# Patient Record
Sex: FEMALE | Race: WHITE | HISPANIC OR LATINO | Employment: UNEMPLOYED | URBAN - METROPOLITAN AREA
[De-identification: names, ages, dates, MRNs, and addresses within clinical notes are randomized per-mention and may not be internally consistent; named-entity substitution may affect disease eponyms.]

---

## 2021-07-27 ENCOUNTER — IMMUNIZATIONS (OUTPATIENT)
Dept: FAMILY MEDICINE CLINIC | Facility: HOSPITAL | Age: 41
End: 2021-07-27

## 2021-07-27 DIAGNOSIS — Z23 ENCOUNTER FOR IMMUNIZATION: Primary | ICD-10-CM

## 2021-07-27 PROCEDURE — 0011A SARS-COV-2 / COVID-19 MRNA VACCINE (MODERNA) 100 MCG: CPT

## 2021-07-27 PROCEDURE — 91301 SARS-COV-2 / COVID-19 MRNA VACCINE (MODERNA) 100 MCG: CPT

## 2021-12-11 ENCOUNTER — HOSPITAL ENCOUNTER (EMERGENCY)
Facility: HOSPITAL | Age: 41
Discharge: HOME | End: 2021-12-11
Attending: EMERGENCY MEDICINE | Admitting: EMERGENCY MEDICINE

## 2021-12-11 VITALS
TEMPERATURE: 96.8 F | HEART RATE: 86 BPM | OXYGEN SATURATION: 100 % | DIASTOLIC BLOOD PRESSURE: 62 MMHG | SYSTOLIC BLOOD PRESSURE: 132 MMHG | BODY MASS INDEX: 24.46 KG/M2 | WEIGHT: 143.3 LBS | HEIGHT: 64 IN | RESPIRATION RATE: 18 BRPM

## 2021-12-11 DIAGNOSIS — N39.0 UTI (URINARY TRACT INFECTION): Primary | ICD-10-CM

## 2021-12-11 LAB
BACTERIA UR QL AUTO: ABNORMAL /HPF
BILIRUB UR QL STRIP: NEGATIVE
CLARITY UR: ABNORMAL
COLOR UR: ABNORMAL
EXT PREG TEST URINE: NEGATIVE
EXT. CONTROL ED NAV: NORMAL
GLUCOSE UR STRIP-MCNC: ABNORMAL MG/DL
HGB UR QL STRIP.AUTO: ABNORMAL
KETONES UR STRIP-MCNC: ABNORMAL MG/DL
LEUKOCYTE ESTERASE UR QL STRIP: ABNORMAL
NITRITE UR QL STRIP: NEGATIVE
NON-SQ EPI CELLS URNS QL MICRO: ABNORMAL /HPF
PH UR STRIP.AUTO: 5 [PH]
PROT UR STRIP-MCNC: NEGATIVE MG/DL
RBC #/AREA URNS AUTO: ABNORMAL /HPF
SP GR UR STRIP.AUTO: 1.02 (ref 1–1.03)
UROBILINOGEN UR QL STRIP.AUTO: 0.2 E.U./DL
WBC #/AREA URNS AUTO: ABNORMAL /HPF

## 2021-12-11 PROCEDURE — 87086 URINE CULTURE/COLONY COUNT: CPT | Performed by: EMERGENCY MEDICINE

## 2021-12-11 PROCEDURE — 99284 EMERGENCY DEPT VISIT MOD MDM: CPT

## 2021-12-11 PROCEDURE — 81025 URINE PREGNANCY TEST: CPT | Performed by: EMERGENCY MEDICINE

## 2021-12-11 PROCEDURE — 99284 EMERGENCY DEPT VISIT MOD MDM: CPT | Performed by: EMERGENCY MEDICINE

## 2021-12-11 PROCEDURE — 81001 URINALYSIS AUTO W/SCOPE: CPT | Performed by: EMERGENCY MEDICINE

## 2021-12-11 PROCEDURE — 87186 SC STD MICRODIL/AGAR DIL: CPT | Performed by: EMERGENCY MEDICINE

## 2021-12-11 PROCEDURE — 87077 CULTURE AEROBIC IDENTIFY: CPT | Performed by: EMERGENCY MEDICINE

## 2021-12-11 RX ORDER — SULFAMETHOXAZOLE AND TRIMETHOPRIM 800; 160 MG/1; MG/1
1 TABLET ORAL EVERY 12 HOURS SCHEDULED
Qty: 10 TABLET | Refills: 0 | Status: SHIPPED | OUTPATIENT
Start: 2021-12-11 | End: 2021-12-16

## 2021-12-11 RX ORDER — SULFAMETHOXAZOLE AND TRIMETHOPRIM 800; 160 MG/1; MG/1
1 TABLET ORAL ONCE
Status: COMPLETED | OUTPATIENT
Start: 2021-12-11 | End: 2021-12-11

## 2021-12-11 RX ADMIN — SULFAMETHOXAZOLE AND TRIMETHOPRIM 1 TABLET: 800; 160 TABLET ORAL at 12:06

## 2021-12-13 LAB — BACTERIA UR CULT: ABNORMAL

## 2023-01-16 ENCOUNTER — OFFICE VISIT (OUTPATIENT)
Dept: OBGYN CLINIC | Facility: CLINIC | Age: 43
End: 2023-01-16

## 2023-01-16 VITALS
SYSTOLIC BLOOD PRESSURE: 125 MMHG | HEART RATE: 105 BPM | HEIGHT: 62 IN | DIASTOLIC BLOOD PRESSURE: 81 MMHG | BODY MASS INDEX: 23.77 KG/M2 | WEIGHT: 129.2 LBS

## 2023-01-16 DIAGNOSIS — Z59.41 FOOD INSECURITY: ICD-10-CM

## 2023-01-16 DIAGNOSIS — Z59.82 TRANSPORTATION INSECURITY: ICD-10-CM

## 2023-01-16 DIAGNOSIS — Z13.31 POSITIVE DEPRESSION SCREENING: ICD-10-CM

## 2023-01-16 DIAGNOSIS — Z30.09 ENCOUNTER FOR COUNSELING REGARDING CONTRACEPTION: Primary | ICD-10-CM

## 2023-01-16 DIAGNOSIS — Z59.819 HOUSING INSTABILITY: ICD-10-CM

## 2023-01-16 DIAGNOSIS — Z59.9 FINANCIAL DIFFICULTIES: ICD-10-CM

## 2023-01-16 LAB — SL AMB POCT URINE HCG: NEGATIVE

## 2023-01-16 RX ORDER — MEDROXYPROGESTERONE ACETATE 150 MG/ML
150 INJECTION, SUSPENSION INTRAMUSCULAR
Qty: 1 ML | Refills: 2 | Status: SHIPPED | OUTPATIENT
Start: 2023-01-16

## 2023-01-16 RX ORDER — MEDROXYPROGESTERONE ACETATE 150 MG/ML
150 INJECTION, SUSPENSION INTRAMUSCULAR
Qty: 1 ML | Refills: 2 | Status: SHIPPED | OUTPATIENT
Start: 2023-01-16 | End: 2023-01-16

## 2023-01-16 SDOH — ECONOMIC STABILITY - INCOME SECURITY: PROBLEM RELATED TO HOUSING AND ECONOMIC CIRCUMSTANCES, UNSPECIFIED: Z59.9

## 2023-01-16 SDOH — ECONOMIC STABILITY - FOOD INSECURITY: FOOD INSECURITY: Z59.41

## 2023-01-16 SDOH — ECONOMIC STABILITY - HOUSING INSECURITY: HOUSING INSTABILITY UNSPECIFIED: Z59.819

## 2023-01-16 SDOH — ECONOMIC STABILITY - TRANSPORTATION SECURITY: TRANSPORTATION INSECURITY: Z59.82

## 2023-01-16 NOTE — PROGRESS NOTES
Assessment/Plan:    No problem-specific Assessment & Plan notes found for this encounter  RTO for annual is due 9 /2023     Diagnoses and all orders for this visit:    Encounter for counseling regarding contraception  -     POCT urine HCG  -     Discontinue: medroxyPROGESTERone (DEPO-PROVERA) 150 mg/mL injection; Inject 1 mL (150 mg total) into a muscle every 3 (three) months  -     medroxyPROGESTERone (DEPO-PROVERA) 150 mg/mL injection; Inject 1 mL (150 mg total) into a muscle every 3 (three) months  Pt to RTO for injection, discount card given  Reviewed BUM x2 wks  Recommend for pt to get hard copy of pap and MMG result    Reviewed importance of f/u wiuth her PCP for her uncontrolled DM, has appt 2/13/23  Reviewed to go to the ER if needed, if symptomatic  Financial difficulties  -     Ambulatory referral to social work care management program; Future    Positive depression screening  -     Ambulatory referral to social work care management program; Future    Transportation insecurity  -     Ambulatory referral to social work care management program; Future    Food insecurity  -     Ambulatory referral to social work care management program; Future    Housing instability  -     Ambulatory referral to social work care management program; Future          Subjective:      Patient ID: Kalin Davis is a 40 y o  female  HPI 41 yo  G3 [id number] new pt here for contraception counseling  She is with her boyfriend who wishes to interpretation  Has been on Depo provera  Last injection was 9/28/22 through [facility]  Was seen in the ER 1/12/23 for bleeding, had her last Depo injection September and was scheduled for 1/16/23, passed a clot the size of a lime, and still has bleeding today  Today it has been 15wks almost 16 wks since her last Depo  Has used condoms for protection  Pt reports has had pap and MMG done 3 months ago through the [facility], pt reports were negative       Hx of DM, thyroid disease  Her glucose was 480 in The ER, was given Metformin  Hgb 11 2  Last Hgb A1c was 10 6 on 8/18/2022  Has appt 2/13/23 with family practice  Hx of DM, thyroid dz  The following portions of the patient's history were reviewed and updated as appropriate: allergies, current medications, past family history, past medical history, past social history, past surgical history and problem list     Review of Systems   Respiratory: Negative  Cardiovascular: Negative  Genitourinary: Positive for menstrual problem  Objective:      /81   Pulse 105   Ht 5' 2" (1 575 m)   Wt 58 6 kg (129 lb 3 2 oz)   BMI 23 63 kg/m²          Physical Exam  Constitutional:       Appearance: Normal appearance  Cardiovascular:      Rate and Rhythm: Normal rate and regular rhythm  Pulmonary:      Effort: Pulmonary effort is normal       Breath sounds: Normal breath sounds  Abdominal:      Palpations: Abdomen is soft  Tenderness: There is no abdominal tenderness  Skin:     General: Skin is warm and dry  Neurological:      Mental Status: She is oriented to person, place, and time  Psychiatric:         Mood and Affect: Mood normal          Behavior: Behavior normal        UPT is negative

## 2023-01-20 ENCOUNTER — CLINICAL SUPPORT (OUTPATIENT)
Dept: OBGYN CLINIC | Facility: CLINIC | Age: 43
End: 2023-01-20

## 2023-01-20 DIAGNOSIS — Z30.42 ENCOUNTER FOR DEPO-PROVERA CONTRACEPTION: ICD-10-CM

## 2023-01-20 RX ORDER — MEDROXYPROGESTERONE ACETATE 150 MG/ML
150 INJECTION, SUSPENSION INTRAMUSCULAR
Status: SHIPPED | OUTPATIENT
Start: 2023-01-20

## 2023-01-20 RX ADMIN — MEDROXYPROGESTERONE ACETATE 150 MG: 150 INJECTION, SUSPENSION INTRAMUSCULAR at 12:14

## 2023-01-20 NOTE — PROGRESS NOTES
Depo-Provera     • [x]   Patient provided box    o 2 Refills remain  o Refills submitted no  • Last  Annual Date / Birth control check : BC visit 01/06/2023, Annual DUE 09/2023  • Last Depo date: 1st injection today  • Side effects: no  • HCG: yes, 96/46/1337  o if applicable: negative  • Given by: Romeo Abernathy  • Site: Left Deltoid    o Calcium supplement daily teaching  o Condoms for 2 weeks following first injection dose

## 2023-01-25 ENCOUNTER — PATIENT OUTREACH (OUTPATIENT)
Dept: OBGYN CLINIC | Facility: CLINIC | Age: 43
End: 2023-01-25

## 2023-01-25 NOTE — PROGRESS NOTES
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

## 2023-02-09 ENCOUNTER — APPOINTMENT (EMERGENCY)
Dept: RADIOLOGY | Facility: HOSPITAL | Age: 43
End: 2023-02-09

## 2023-02-09 ENCOUNTER — HOSPITAL ENCOUNTER (EMERGENCY)
Facility: HOSPITAL | Age: 43
Discharge: HOME | End: 2023-02-09
Attending: EMERGENCY MEDICINE

## 2023-02-09 VITALS
DIASTOLIC BLOOD PRESSURE: 60 MMHG | TEMPERATURE: 97.5 F | SYSTOLIC BLOOD PRESSURE: 125 MMHG | OXYGEN SATURATION: 100 % | HEART RATE: 76 BPM | RESPIRATION RATE: 16 BRPM

## 2023-02-09 DIAGNOSIS — R73.9 HYPERGLYCEMIA: ICD-10-CM

## 2023-02-09 DIAGNOSIS — N39.0 UTI (URINARY TRACT INFECTION): Primary | ICD-10-CM

## 2023-02-09 LAB
ANION GAP SERPL CALCULATED.3IONS-SCNC: 12 MMOL/L (ref 4–13)
BACTERIA UR QL AUTO: ABNORMAL /HPF
BASE EX.OXY STD BLDV CALC-SCNC: 91.9 % (ref 60–80)
BASE EXCESS BLDV CALC-SCNC: -4.9 MMOL/L
BASOPHILS # BLD AUTO: 0.03 THOUSANDS/ÂΜL (ref 0–0.1)
BASOPHILS NFR BLD AUTO: 1 % (ref 0–1)
BILIRUB UR QL STRIP: NEGATIVE
BUN SERPL-MCNC: 11 MG/DL (ref 5–25)
CALCIUM SERPL-MCNC: 9.1 MG/DL (ref 8.4–10.2)
CARDIAC TROPONIN I PNL SERPL HS: <2 NG/L
CHLORIDE SERPL-SCNC: 101 MMOL/L (ref 96–108)
CLARITY UR: ABNORMAL
CO2 SERPL-SCNC: 19 MMOL/L (ref 21–32)
COLOR UR: YELLOW
CREAT SERPL-MCNC: 0.57 MG/DL (ref 0.6–1.3)
EOSINOPHIL # BLD AUTO: 0 THOUSAND/ÂΜL (ref 0–0.61)
EOSINOPHIL NFR BLD AUTO: 0 % (ref 0–6)
ERYTHROCYTE [DISTWIDTH] IN BLOOD BY AUTOMATED COUNT: 11.8 % (ref 11.6–15.1)
EXT PREGNANCY TEST URINE: NEGATIVE
EXT. CONTROL: NORMAL
GFR SERPL CREATININE-BSD FRML MDRD: 115 ML/MIN/1.73SQ M
GLUCOSE SERPL-MCNC: 240 MG/DL (ref 65–140)
GLUCOSE UR STRIP-MCNC: ABNORMAL MG/DL
HCO3 BLDV-SCNC: 18.8 MMOL/L (ref 24–30)
HCT VFR BLD AUTO: 36.6 % (ref 34.8–46.1)
HGB BLD-MCNC: 12.3 G/DL (ref 11.5–15.4)
HGB UR QL STRIP.AUTO: ABNORMAL
IMM GRANULOCYTES # BLD AUTO: 0.01 THOUSAND/UL (ref 0–0.2)
IMM GRANULOCYTES NFR BLD AUTO: 0 % (ref 0–2)
KETONES UR STRIP-MCNC: ABNORMAL MG/DL
LEUKOCYTE ESTERASE UR QL STRIP: NEGATIVE
LYMPHOCYTES # BLD AUTO: 1.38 THOUSANDS/ÂΜL (ref 0.6–4.47)
LYMPHOCYTES NFR BLD AUTO: 22 % (ref 14–44)
MCH RBC QN AUTO: 28.3 PG (ref 26.8–34.3)
MCHC RBC AUTO-ENTMCNC: 33.6 G/DL (ref 31.4–37.4)
MCV RBC AUTO: 84 FL (ref 82–98)
MONOCYTES # BLD AUTO: 0.3 THOUSAND/ÂΜL (ref 0.17–1.22)
MONOCYTES NFR BLD AUTO: 5 % (ref 4–12)
NEUTROPHILS # BLD AUTO: 4.55 THOUSANDS/ÂΜL (ref 1.85–7.62)
NEUTS SEG NFR BLD AUTO: 72 % (ref 43–75)
NITRITE UR QL STRIP: POSITIVE
NON-SQ EPI CELLS URNS QL MICRO: ABNORMAL /HPF
NRBC BLD AUTO-RTO: 0 /100 WBCS
O2 CT BLDV-SCNC: 15.8 ML/DL
PCO2 BLDV: 30.9 MM HG (ref 42–50)
PH BLDV: 7.4 [PH] (ref 7.3–7.4)
PH UR STRIP.AUTO: 6 [PH]
PLATELET # BLD AUTO: 281 THOUSANDS/UL (ref 149–390)
PMV BLD AUTO: 10 FL (ref 8.9–12.7)
PO2 BLDV: 70.9 MM HG (ref 35–45)
POTASSIUM SERPL-SCNC: 3.3 MMOL/L (ref 3.5–5.3)
PROT UR STRIP-MCNC: NEGATIVE MG/DL
RBC # BLD AUTO: 4.34 MILLION/UL (ref 3.81–5.12)
RBC #/AREA URNS AUTO: ABNORMAL /HPF
SODIUM SERPL-SCNC: 132 MMOL/L (ref 135–147)
SP GR UR STRIP.AUTO: 1.01 (ref 1–1.03)
TSH SERPL DL<=0.05 MIU/L-ACNC: 14.75 UIU/ML (ref 0.45–4.5)
UROBILINOGEN UR QL STRIP.AUTO: 0.2 E.U./DL
WBC # BLD AUTO: 6.27 THOUSAND/UL (ref 4.31–10.16)
WBC #/AREA URNS AUTO: ABNORMAL /HPF

## 2023-02-09 RX ORDER — CIPROFLOXACIN 500 MG/1
500 TABLET, FILM COATED ORAL EVERY 12 HOURS SCHEDULED
Qty: 20 TABLET | Refills: 0 | Status: SHIPPED | OUTPATIENT
Start: 2023-02-09 | End: 2023-02-19

## 2023-02-09 RX ORDER — CIPROFLOXACIN 500 MG/1
500 TABLET, FILM COATED ORAL ONCE
Status: COMPLETED | OUTPATIENT
Start: 2023-02-09 | End: 2023-02-09

## 2023-02-09 RX ADMIN — CIPROFLOXACIN 500 MG: 500 TABLET, FILM COATED ORAL at 21:57

## 2023-02-09 RX ADMIN — SODIUM CHLORIDE, SODIUM LACTATE, POTASSIUM CHLORIDE, AND CALCIUM CHLORIDE 1000 ML: .6; .31; .03; .02 INJECTION, SOLUTION INTRAVENOUS at 19:56

## 2023-02-10 NOTE — DISCHARGE INSTRUCTIONS
Please call your primary care physician and schedule appointment  Please take the antibiotics as prescribed  If you develop fevers chills worsening pain inability to tolerate anything by mouth or any new or worsening symptoms please return to the ED for further evaluation  Please continue to take your metformin as previously prescribed and monitor your blood sugars

## 2023-02-10 NOTE — ED PROVIDER NOTES
History  Chief Complaint   Patient presents with   • Hyperglycemia - Symptomatic     Pt reports having high BG at home - reports being between 250-300 at home; takes metformin; also reports chest pain     HPI  Patient is a 56-year-old female history obtained with aid of   Reports having higher glucose at home around 250-300  She is on metformin  She is not insulin-dependent  Reports her last several days has had intermittent chest pain that she described as a "fullness" in the left lower chest wall  She denies any pleuritic, exertional element denies any orthopnea or PND  Does report some anxiety related to her symptoms  Denies any infectious symptoms including fevers chills cough congestion denies any urinary symptoms dysuria hematuria no vaginal bleeding vaginal discharge  No abdominal pain  Is otherwise in her usual state of health  Prior to Admission Medications   Prescriptions Last Dose Informant Patient Reported? Taking?    Cholecalciferol (Vitamin D3) 50 MCG (2000) TABS   No No   Sig: Take 1 tablet (2,000 Units total) by mouth daily   Patient not taking: Reported on 2022   medroxyPROGESTERone (DEPO-PROVERA) 150 mg/mL injection   No No   Sig: Inject 1 mL (150 mg total) into a muscle every 3 (three) months   metFORMIN (GLUCOPHAGE) 500 mg tablet   No No   Sig: Take 1 tablet (500 mg total) by mouth 2 (two) times a day with meals   Patient taking differently: Take 500 mg by mouth daily with breakfast   metFORMIN (GLUCOPHAGE) 850 mg tablet   No No   Sig: Take 1 tablet (850 mg total) by mouth daily with breakfast   Patient not taking: Reported on 2022      Facility-Administered Medications Last Administration Doses Remaining   medroxyPROGESTERone (DEPO-PROVERA) IM injection 150 mg 2023 12:14 PM           Past Medical History:   Diagnosis Date   • Diabetes mellitus ([facility] [address] )    • Disease of thyroid gland        Past Surgical History:   Procedure Laterality Date   •  SECTION         Family History   Problem Relation Age of Onset   • Diabetes Mother    • No Known Problems Father    • Diabetes Sister    • No Known Problems Brother    • No Known Problems Brother    • No Known Problems Son    • No Known Problems Son    • Diabetes Maternal Grandmother    • Breast cancer Neg Hx    • Colon cancer Neg Hx    • Ovarian cancer Neg Hx      I have reviewed and agree with the history as documented  E-Cigarette/Vaping   • E-Cigarette Use Never User      E-Cigarette/Vaping Substances   • Nicotine No    • THC No    • CBD No    • Flavoring No    • Other No    • Unknown No      Social History     Tobacco Use   • Smoking status: Never   • Smokeless tobacco: Never   Vaping Use   • Vaping Use: Never used   Substance Use Topics   • Alcohol use: Not Currently     Comment: socially   • Drug use: Never       Review of Systems   Constitutional: Negative for chills and fever  HENT: Negative for congestion and sore throat  Eyes: Negative for redness and visual disturbance  Respiratory: Negative for cough and shortness of breath  Cardiovascular: Positive for chest pain  Negative for palpitations  Gastrointestinal: Negative for constipation, diarrhea, nausea and vomiting  Genitourinary: Negative for dysuria, hematuria, vaginal bleeding and vaginal discharge  Musculoskeletal: Negative for myalgias  Skin: Negative for rash and wound  Allergic/Immunologic: Negative for immunocompromised state  Neurological: Negative for seizures and syncope  Psychiatric/Behavioral: Negative for confusion, self-injury and suicidal ideas  The patient is nervous/anxious  Physical Exam  Physical Exam  Vitals and nursing note reviewed  Constitutional:       General: She is not in acute distress  Appearance: Normal appearance  She is well-developed  She is not ill-appearing  HENT:      Head: Normocephalic and atraumatic  No raccoon eyes        Comments: Moist mucous membranes     Right Ear: External ear normal       Left Ear: External ear normal       Nose: Nose normal  No congestion  Mouth/Throat:      Lips: Pink  Mouth: Mucous membranes are moist    Eyes:      General: Lids are normal  No scleral icterus  Conjunctiva/sclera: Conjunctivae normal    Cardiovascular:      Rate and Rhythm: Normal rate and regular rhythm  Pulses:           Radial pulses are 2+ on the right side and 2+ on the left side  Heart sounds: No murmur heard  No friction rub  Pulmonary:      Effort: Pulmonary effort is normal  No respiratory distress  Breath sounds: No wheezing or rales  Chest:      Chest wall: No tenderness or crepitus  Comments: No reproducible chest wall tenderness  Abdominal:      General: Abdomen is flat  Tenderness: There is no abdominal tenderness  There is no guarding or rebound  Musculoskeletal:         General: No swelling or signs of injury  Cervical back: Normal range of motion  No rigidity or tenderness  Right lower leg: No edema  Left lower leg: No edema  Skin:     General: Skin is warm and dry  Coloration: Skin is not jaundiced  Findings: No rash  Neurological:      Mental Status: She is alert and oriented to person, place, and time  Mental status is at baseline  Psychiatric:         Mood and Affect: Mood is anxious  Behavior: Behavior normal  Behavior is cooperative           Vital Signs  ED Triage Vitals [02/09/23 1921]   Temperature Pulse Respirations Blood Pressure SpO2   97 5 °F (36 4 °C) 97 20 132/70 100 %      Temp Source Heart Rate Source Patient Position - Orthostatic VS BP Location FiO2 (%)   Oral Monitor Sitting Left arm --      Pain Score       --           Vitals:    02/09/23 1921   BP: 132/70   Pulse: 97   Patient Position - Orthostatic VS: Sitting         Visual Acuity      ED Medications  Medications   lactated ringers bolus 1,000 mL (has no administration in time range)       Diagnostic Studies  Results Reviewed     Procedure Component Value Units Date/Time    HS Troponin 0hr (reflex protocol) [[id number]]     Lab Status: No result Specimen: Blood     Basic metabolic panel [[id number]]     Lab Status: No result Specimen: Blood     Blood gas, venous [[id number]]     Lab Status: No result Specimen: Blood     CBC and differential [[id number]]     Lab Status: No result Specimen: Blood     UA w Reflex to Microscopic w Reflex to Culture [[id number]]     Lab Status: No result Specimen: Urine     POCT pregnancy, urine [[id number]]     Lab Status: No result                  XR chest 2 views    (Results Pending)              Procedures  Procedures         ED Course  ED Course as of 02/09/23 2226   Thu Feb 09, 2023   1938 EKG NS rate 86 wo ST deviation or t wave inversions, No significant QRS widening or QT prolongation  2019 WBC: 6 27   2019 Hemoglobin: 12 3   2019 Leukocytes, UA: Negative   2019 Nitrite, UA(!): Positive   2019 PREGNANCY TEST URINE: Negative   2019 pH, Santhosh(!): 7 403   2101 TSH 3RD GENERATON(!): 14 751  On levothyroxine 25   2101 hs TnI 0hr: <2   2101 Sodium(!): 132   2101 Potassium(!): 3 3   2101 Glucose, Random(!): 240     Work-up is overall reassuring does have a positive UA with nitrates, many bacteria on microscopy with few epithelial cells  Will treat as complicated UTI given DM history  No CVA tenderness and lower concern for pyelonephritis  Will discharge in stable condition  Advised PCP follow-up  Return precautions discussed  MDM  Patient on arrival is ambulatory to room is in no acute distress, vital signs stable, afebrile  On exam lungs clear auscultation, heart without murmurs rubs or gallops abdomen soft nontender  Overall reassuring physical exam does appear somewhat anxious  Will obtain labs including BMP, VBG to assess for DKA, HHNK  Lower concern for ACS given lack of significant risk factors    Will obtain a troponin to further assess  EKG reassuring without significant arrhythmia  Disposition  Final diagnoses:   None     ED Disposition     None      Follow-up Information    None         Patient's Medications   Discharge Prescriptions    No medications on file       No discharge procedures on file      PDMP Review     None          ED Provider  Electronically Signed by           Sara Rubio MD  02/09/23 9244

## 2023-02-11 LAB
ATRIAL RATE: 86 BPM
P AXIS: 53 DEGREES
PR INTERVAL: 156 MS
QRS AXIS: 24 DEGREES
QRSD INTERVAL: 88 MS
QT INTERVAL: 364 MS
QTC INTERVAL: 435 MS
T WAVE AXIS: 4 DEGREES
VENTRICULAR RATE: 86 BPM

## 2023-03-20 ENCOUNTER — HOSPITAL ENCOUNTER (EMERGENCY)
Facility: HOSPITAL | Age: 43
Discharge: HOME | End: 2023-03-20
Attending: INTERNAL MEDICINE

## 2023-03-20 VITALS
OXYGEN SATURATION: 99 % | SYSTOLIC BLOOD PRESSURE: 135 MMHG | HEART RATE: 89 BPM | RESPIRATION RATE: 16 BRPM | TEMPERATURE: 97.8 F | DIASTOLIC BLOOD PRESSURE: 75 MMHG

## 2023-03-20 DIAGNOSIS — Z76.0 MEDICATION REFILL: ICD-10-CM

## 2023-03-20 DIAGNOSIS — L03.012 PARONYCHIA OF FINGER OF LEFT HAND: Primary | ICD-10-CM

## 2023-03-20 RX ORDER — CEPHALEXIN 500 MG/1
500 CAPSULE ORAL 3 TIMES DAILY
Qty: 21 CAPSULE | Refills: 0 | Status: SHIPPED | OUTPATIENT
Start: 2023-03-20 | End: 2023-03-20 | Stop reason: SDUPTHER

## 2023-03-20 RX ORDER — CEPHALEXIN 500 MG/1
500 CAPSULE ORAL 3 TIMES DAILY
Qty: 21 CAPSULE | Refills: 0 | Status: SHIPPED | OUTPATIENT
Start: 2023-03-20 | End: 2023-03-27

## 2023-03-20 NOTE — DISCHARGE INSTRUCTIONS
Use Tylenol every 4 hours or Motrin every 6 hours; you can alternate the 2 medications taking something every 3 hours for pain or fever  Take all oral antibiotics until done  Soak finger in warm, soapy water, keep covered in antibiotic ointment and Band-Aid to help promote drainage and softening of skin    If no improvement follow-up with your doctor in next few days     Follow-up with your doctor for all medication refills

## 2023-03-20 NOTE — ED PROVIDER NOTES
History  Chief Complaint   Patient presents with   • Finger Pain     Pt is concerned about her L 4th digit  Near the nail bed she is having swelling and pain  PMH: DM  PSH:     Patient presents to ED c/o 4-day history of pain and swelling along cuticle of left fourth digit, concerned bc h/o DM  Pt states had similar infection several weeks ago, drained purulent dc, but then resolved, now sx return, but just pain/swelling, no dc  No recent manicure, has not cut cuticle or nail  No fever, other complaints          Prior to Admission Medications   Prescriptions Last Dose Informant Patient Reported? Taking? medroxyPROGESTERone (DEPO-PROVERA) 150 mg/mL injection   No No   Sig: Inject 1 mL (150 mg total) into a muscle every 3 (three) months   metFORMIN (GLUCOPHAGE) 500 mg tablet   Yes Yes   Sig: Take 500 mg by mouth 2 (two) times a day with meals      Facility-Administered Medications Last Administration Doses Remaining   medroxyPROGESTERone (DEPO-PROVERA) IM injection 150 mg 2023 12:14 PM           Past Medical History:   Diagnosis Date   • Diabetes mellitus ([facility] [address] )    • Disease of thyroid gland        Past Surgical History:   Procedure Laterality Date   •  SECTION         Family History   Problem Relation Age of Onset   • Diabetes Mother    • No Known Problems Father    • Diabetes Sister    • No Known Problems Brother    • No Known Problems Brother    • No Known Problems Son    • No Known Problems Son    • Diabetes Maternal Grandmother    • Breast cancer Neg Hx    • Colon cancer Neg Hx    • Ovarian cancer Neg Hx      I have reviewed and agree with the history as documented      E-Cigarette/Vaping   • E-Cigarette Use Never User      E-Cigarette/Vaping Substances   • Nicotine No    • THC No    • CBD No    • Flavoring No    • Other No    • Unknown No      Social History     Tobacco Use   • Smoking status: Never   • Smokeless tobacco: Never   Vaping Use   • Vaping Use: Never used   Substance Use Topics   • Alcohol use: Not Currently     Comment: socially   • Drug use: Never       Review of Systems   Constitutional: Negative for fever  Gastrointestinal: Negative for vomiting  Skin: Negative for color change, rash and wound  Neurological: Negative for numbness  All other systems reviewed and are negative  Physical Exam  Physical Exam  Constitutional:       General: She is not in acute distress  Appearance: Normal appearance  HENT:      Nose: Nose normal       Mouth/Throat:      Mouth: Mucous membranes are moist       Pharynx: Oropharynx is clear  Pulmonary:      Effort: No respiratory distress  Musculoskeletal:         General: Swelling and tenderness present  Comments: Minimal diffuse tenderness, slight swelling noted along cuticle left ring finger, tiny hangnail noted along lateral margin, no dc, no erythema, no pocket of fluctuance, FROM maintained   Skin:     Findings: No erythema  Neurological:      General: No focal deficit present  Mental Status: She is alert  Vital Signs  ED Triage Vitals [03/20/23 1134]   Temperature Pulse Respirations Blood Pressure SpO2   97 8 °F (36 6 °C) 89 16 135/75 99 %      Temp Source Heart Rate Source Patient Position - Orthostatic VS BP Location FiO2 (%)   Oral Monitor Lying Right arm --      Pain Score       --           Vitals:    03/20/23 1134   BP: 135/75   Pulse: 89   Patient Position - Orthostatic VS: Lying         Visual Acuity      ED Medications  Medications - No data to display    Diagnostic Studies  Results Reviewed     None                 No orders to display              Procedures  Procedures         ED Course                               SBIRT 20yo+    Flowsheet Row Most Recent Value   SBIRT (23 yo +)    In order to provide better care to our patients, we are screening all of our patients for alcohol and drug use  Would it be okay to ask you these screening questions?  No Filed at: 03/20/2023 1151 Medical Decision Making  No indication for I&D at this time, will cover with oral antibx, soaks, wraps  Pt also asking for 1 month of metformin bc she ran out, confirmed that she takes 500mg BID  Amount and/or Complexity of Data Reviewed  External Data Reviewed: notes  Risk  Prescription drug management  Disposition  Final diagnoses:   Paronychia of finger of left hand   Medication refill - DM     Time reflects when diagnosis was documented in both MDM as applicable and the Disposition within this note     Time User Action Codes Description Comment    3/20/2023 11:55 AM [facility] Add [V19 661] Paronychia of finger of left hand     3/20/2023 11:57 AM [facility] Add [E11 65] Uncontrolled diabetes mellitus with hyperglycemia ([facility] [address] )     3/20/2023 12:01 PM [facility] Add [Z76 0] Medication refill     3/20/2023 12:01 PM [facility] Remove [E11 65] Uncontrolled diabetes mellitus with hyperglycemia ([facility] [address] )     3/20/2023 12:01 PM [facility] Modify [Z76 0] Medication refill DM      ED Disposition     ED Disposition   Discharge    Condition   Stable    Date/Time   Mon Mar 20, 2023 11:53 AM    Comment   Misty Choi discharge to home/self care  Follow-up Information     Follow up With Specialties Details Why Contact Info    Your PCP              Patient's Medications   Discharge Prescriptions    CEPHALEXIN (KEFLEX) 500 MG CAPSULE    Take 1 capsule (500 mg total) by mouth 3 (three) times a day for 7 days       Start Date: 3/20/2023 End Date: 3/27/2023       Order Dose: 500 mg       Quantity: 21 capsule    Refills: 0    METFORMIN (GLUCOPHAGE) 500 MG TABLET    Take 1 tablet (500 mg total) by mouth 2 (two) times a day with meals       Start Date: 3/20/2023 End Date: 4/19/2023       Order Dose: 500 mg       Quantity: 60 tablet    Refills: 0       No discharge procedures on file      PDMP Review     None          ED Provider  Electronically Signed by Brooklyn Resendiz PA-C  03/20/23 0624

## 2023-07-18 ENCOUNTER — HOSPITAL ENCOUNTER (EMERGENCY)
Facility: HOSPITAL | Age: 43
Discharge: HOME | End: 2023-07-18
Attending: EMERGENCY MEDICINE | Admitting: EMERGENCY MEDICINE

## 2023-07-18 VITALS
OXYGEN SATURATION: 100 % | HEART RATE: 94 BPM | RESPIRATION RATE: 20 BRPM | DIASTOLIC BLOOD PRESSURE: 55 MMHG | SYSTOLIC BLOOD PRESSURE: 120 MMHG | TEMPERATURE: 97.5 F

## 2023-07-18 DIAGNOSIS — N12 PYELONEPHRITIS: Primary | ICD-10-CM

## 2023-07-18 LAB
ALBUMIN SERPL BCP-MCNC: 3.9 G/DL (ref 3.5–5)
ALP SERPL-CCNC: 75 U/L (ref 34–104)
ALT SERPL W P-5'-P-CCNC: 9 U/L (ref 7–52)
ANION GAP SERPL CALCULATED.3IONS-SCNC: 8 MMOL/L
AST SERPL W P-5'-P-CCNC: 10 U/L (ref 13–39)
BACTERIA UR QL AUTO: ABNORMAL /HPF
BASOPHILS # BLD AUTO: 0.02 THOUSANDS/ÂΜL (ref 0–0.1)
BASOPHILS NFR BLD AUTO: 0 % (ref 0–1)
BILIRUB SERPL-MCNC: 0.63 MG/DL (ref 0.2–1)
BILIRUB UR QL STRIP: NEGATIVE
BUN SERPL-MCNC: 12 MG/DL (ref 5–25)
CALCIUM SERPL-MCNC: 8.4 MG/DL (ref 8.4–10.2)
CHLORIDE SERPL-SCNC: 103 MMOL/L (ref 96–108)
CLARITY UR: ABNORMAL
CO2 SERPL-SCNC: 22 MMOL/L (ref 21–32)
COLOR UR: YELLOW
CREAT SERPL-MCNC: 0.59 MG/DL (ref 0.6–1.3)
EOSINOPHIL # BLD AUTO: 0.03 THOUSAND/ÂΜL (ref 0–0.61)
EOSINOPHIL NFR BLD AUTO: 0 % (ref 0–6)
ERYTHROCYTE [DISTWIDTH] IN BLOOD BY AUTOMATED COUNT: 16 % (ref 11.6–15.1)
EXT PREGNANCY TEST URINE: NEGATIVE
EXT. CONTROL: NORMAL
GFR SERPL CREATININE-BSD FRML MDRD: 113 ML/MIN/1.73SQ M
GLUCOSE SERPL-MCNC: 252 MG/DL (ref 65–140)
GLUCOSE UR STRIP-MCNC: ABNORMAL MG/DL
HCT VFR BLD AUTO: 33.8 % (ref 34.8–46.1)
HGB BLD-MCNC: 10.8 G/DL (ref 11.5–15.4)
HGB UR QL STRIP.AUTO: ABNORMAL
IMM GRANULOCYTES # BLD AUTO: 0.03 THOUSAND/UL (ref 0–0.2)
IMM GRANULOCYTES NFR BLD AUTO: 0 % (ref 0–2)
KETONES UR STRIP-MCNC: ABNORMAL MG/DL
LEUKOCYTE ESTERASE UR QL STRIP: ABNORMAL
LIPASE SERPL-CCNC: 45 U/L (ref 11–82)
LYMPHOCYTES # BLD AUTO: 1.26 THOUSANDS/ÂΜL (ref 0.6–4.47)
LYMPHOCYTES NFR BLD AUTO: 16 % (ref 14–44)
MCH RBC QN AUTO: 24 PG (ref 26.8–34.3)
MCHC RBC AUTO-ENTMCNC: 32 G/DL (ref 31.4–37.4)
MCV RBC AUTO: 75 FL (ref 82–98)
MONOCYTES # BLD AUTO: 0.76 THOUSAND/ÂΜL (ref 0.17–1.22)
MONOCYTES NFR BLD AUTO: 9 % (ref 4–12)
NEUTROPHILS # BLD AUTO: 6.05 THOUSANDS/ÂΜL (ref 1.85–7.62)
NEUTS SEG NFR BLD AUTO: 75 % (ref 43–75)
NITRITE UR QL STRIP: POSITIVE
NON-SQ EPI CELLS URNS QL MICRO: ABNORMAL /HPF
NRBC BLD AUTO-RTO: 0 /100 WBCS
PH UR STRIP.AUTO: 7 [PH]
PLATELET # BLD AUTO: 206 THOUSANDS/UL (ref 149–390)
PMV BLD AUTO: 10.5 FL (ref 8.9–12.7)
POTASSIUM SERPL-SCNC: 3.6 MMOL/L (ref 3.5–5.3)
PROT SERPL-MCNC: 7.4 G/DL (ref 6.4–8.4)
PROT UR STRIP-MCNC: ABNORMAL MG/DL
RBC # BLD AUTO: 4.5 MILLION/UL (ref 3.81–5.12)
RBC #/AREA URNS AUTO: ABNORMAL /HPF
SODIUM SERPL-SCNC: 133 MMOL/L (ref 135–147)
SP GR UR STRIP.AUTO: 1.01 (ref 1–1.03)
UROBILINOGEN UR QL STRIP.AUTO: 0.2 E.U./DL
WBC # BLD AUTO: 8.15 THOUSAND/UL (ref 4.31–10.16)
WBC #/AREA URNS AUTO: ABNORMAL /HPF

## 2023-07-18 PROCEDURE — 36415 COLL VENOUS BLD VENIPUNCTURE: CPT | Performed by: EMERGENCY MEDICINE

## 2023-07-18 PROCEDURE — 85025 COMPLETE CBC W/AUTO DIFF WBC: CPT | Performed by: EMERGENCY MEDICINE

## 2023-07-18 PROCEDURE — 87086 URINE CULTURE/COLONY COUNT: CPT | Performed by: EMERGENCY MEDICINE

## 2023-07-18 PROCEDURE — 81001 URINALYSIS AUTO W/SCOPE: CPT | Performed by: EMERGENCY MEDICINE

## 2023-07-18 PROCEDURE — 83690 ASSAY OF LIPASE: CPT | Performed by: EMERGENCY MEDICINE

## 2023-07-18 PROCEDURE — 87077 CULTURE AEROBIC IDENTIFY: CPT | Performed by: EMERGENCY MEDICINE

## 2023-07-18 PROCEDURE — 81025 URINE PREGNANCY TEST: CPT | Performed by: EMERGENCY MEDICINE

## 2023-07-18 PROCEDURE — 80053 COMPREHEN METABOLIC PANEL: CPT | Performed by: EMERGENCY MEDICINE

## 2023-07-18 RX ORDER — KETOROLAC TROMETHAMINE 30 MG/ML
15 INJECTION, SOLUTION INTRAMUSCULAR; INTRAVENOUS ONCE
Status: COMPLETED | OUTPATIENT
Start: 2023-07-18 | End: 2023-07-18

## 2023-07-18 RX ORDER — CEFDINIR 300 MG/1
300 CAPSULE ORAL EVERY 12 HOURS SCHEDULED
Qty: 14 CAPSULE | Refills: 0 | Status: SHIPPED | OUTPATIENT
Start: 2023-07-18 | End: 2023-07-25

## 2023-07-18 RX ORDER — CEFTRIAXONE 1 G/50ML
1000 INJECTION, SOLUTION INTRAVENOUS ONCE
Status: COMPLETED | OUTPATIENT
Start: 2023-07-18 | End: 2023-07-18

## 2023-07-18 RX ADMIN — SODIUM CHLORIDE 1000 ML: 0.9 INJECTION, SOLUTION INTRAVENOUS at 14:03

## 2023-07-18 RX ADMIN — CEFTRIAXONE 1000 MG: 1 INJECTION, SOLUTION INTRAVENOUS at 14:04

## 2023-07-18 RX ADMIN — KETOROLAC TROMETHAMINE 15 MG: 30 INJECTION, SOLUTION INTRAMUSCULAR at 14:48

## 2023-07-18 NOTE — ED PROVIDER NOTES
History  Chief Complaint   Patient presents with   • Possible UTI     Pt report burning foamy urine x 4 days; pt reports she is diabetic; symptoms started after swimming a lake     Patient reports that she swam in a lake 5 days ago. After that she developed burning with urination and the constant need to use the bathroom despite only having small amounts of urine. 2 days ago she had the same symptoms but now also has pain in her bilateral flanks. She denies having any fevers or chills. She notes that she is a diabetic. Her sugars have been stable. She does not have any abdominal pain. No exacerbating or relieving factors. She has not been on any antibiotics in the past few months. She reports similar symptoms 2 years ago due to a urinary tract infection although at that time she was not having flank pain. She denies history of kidney stones. Pain is symmetric in the flanks. None       Past Medical History:   Diagnosis Date   • Diabetes mellitus ([address])        History reviewed. No pertinent surgical history. History reviewed. No pertinent family history. I have reviewed and agree with the history as documented. E-Cigarette/Vaping   • E-Cigarette Use Never User      E-Cigarette/Vaping Substances     Social History     Tobacco Use   • Smoking status: Never   • Smokeless tobacco: Never   Vaping Use   • Vaping Use: Never used   Substance Use Topics   • Alcohol use: Not Currently   • Drug use: Never       Review of Systems   All other systems reviewed and are negative. Physical Exam  Physical Exam  Vitals and nursing note reviewed. Constitutional:       General: She is not in acute distress. Appearance: She is well-developed. HENT:      Head: Normocephalic and atraumatic. Eyes:      Conjunctiva/sclera: Conjunctivae normal.   Cardiovascular:      Rate and Rhythm: Normal rate and regular rhythm. Heart sounds: No murmur heard.   Pulmonary:      Effort: Pulmonary effort is normal. No respiratory distress. Breath sounds: Normal breath sounds. Abdominal:      Palpations: Abdomen is soft. Tenderness: There is no abdominal tenderness. Genitourinary:     Comments: Mild bilateral CVA tenderness  Musculoskeletal:         General: No swelling. Cervical back: Neck supple. Skin:     General: Skin is warm and dry. Capillary Refill: Capillary refill takes less than 2 seconds. Neurological:      Mental Status: She is alert. Psychiatric:         Mood and Affect: Mood normal.         Vital Signs  ED Triage Vitals   Temperature Pulse Respirations Blood Pressure SpO2   07/18/23 1338 07/18/23 1337 07/18/23 1335 07/18/23 1337 07/18/23 1337   97.5 °F (36.4 °C) 94 20 120/55 100 %      Temp Source Heart Rate Source Patient Position - Orthostatic VS BP Location FiO2 (%)   07/18/23 1338 07/18/23 1335 07/18/23 1335 07/18/23 1335 --   Oral Monitor Sitting Right arm       Pain Score       07/18/23 1448       5           Vitals:    07/18/23 1335 07/18/23 1337   BP:  120/55   Pulse:  94   Patient Position - Orthostatic VS: Sitting          Visual Acuity      ED Medications  Medications   sodium chloride 0.9 % bolus 1,000 mL (0 mL Intravenous Stopped 7/18/23 1446)   cefTRIAXone (ROCEPHIN) IVPB (premix in dextrose) 1,000 mg 50 mL (0 mg Intravenous Stopped 7/18/23 1446)   ketorolac (TORADOL) injection 15 mg (15 mg Intravenous Given 7/18/23 1448)       Diagnostic Studies  Results Reviewed     Procedure Component Value Units Date/Time    Urine Microscopic [[id number]]  (Abnormal) Collected: 07/18/23 1359    Lab Status: Final result Specimen: Urine, Other Updated: 07/18/23 1456     RBC, UA 0-5 /hpf      WBC, UA Innumerable /hpf      Epithelial Cells None Seen /hpf      Bacteria, UA Occasional /hpf     Urine culture [[id number]] Collected: 07/18/23 1359    Lab Status:  In process Specimen: Urine, Other Updated: 07/18/23 1456    Lipase [[id number]]  (Normal) Collected: 07/18/23 1403    Lab Status: Final result Specimen: Blood from Arm, Right Updated: 07/18/23 1433     Lipase 45 u/L     Comprehensive metabolic panel [[id number]]  (Abnormal) Collected: 07/18/23 1403    Lab Status: Final result Specimen: Blood from Arm, Right Updated: 07/18/23 1433     Sodium 133 mmol/L      Potassium 3.6 mmol/L      Chloride 103 mmol/L      CO2 22 mmol/L      ANION GAP 8 mmol/L      BUN 12 mg/dL      Creatinine 0.59 mg/dL      Glucose 252 mg/dL      Calcium 8.4 mg/dL      AST 10 U/L      ALT 9 U/L      Alkaline Phosphatase 75 U/L      Total Protein 7.4 g/dL      Albumin 3.9 g/dL      Total Bilirubin 0.63 mg/dL      eGFR 113 ml/min/1.73sq m     Narrative:      [place] guidelines for Chronic Kidney Disease (CKD):   •  Stage 1 with normal or high GFR (GFR > 90 mL/min/1.73 square meters)  •  Stage 2 Mild CKD (GFR = 60-89 mL/min/1.73 square meters)  •  Stage 3A Moderate CKD (GFR = 45-59 mL/min/1.73 square meters)  •  Stage 3B Moderate CKD (GFR = 30-44 mL/min/1.73 square meters)  •  Stage 4 Severe CKD (GFR = 15-29 mL/min/1.73 square meters)  •  Stage 5 End Stage CKD (GFR <15 mL/min/1.73 square meters)  Note: GFR calculation is accurate only with a steady state creatinine    UA w Reflex to Microscopic w Reflex to Culture [[id number]]  (Abnormal) Collected: 07/18/23 1359    Lab Status: Final result Specimen: Urine, Other Updated: 07/18/23 1418     Color, UA Yellow     Clarity, UA Cloudy     Specific Gravity, UA 1.010     pH, UA 7.0     Leukocytes, UA 3+     Nitrite, UA Positive     Protein, UA Trace mg/dl      Glucose, UA 3+ mg/dl      Ketones, UA 15 (1+) mg/dl      Urobilinogen, UA 0.2 E.U./dl      Bilirubin, UA Negative     Occult Blood, UA 2+    CBC and differential [[id number]]  (Abnormal) Collected: 07/18/23 1403    Lab Status: Final result Specimen: Blood from Arm, Right Updated: 07/18/23 1416     WBC 8.15 Thousand/uL      RBC 4.50 Million/uL      Hemoglobin 10.8 g/dL      Hematocrit 33.8 %      MCV 75 fL MCH 24.0 pg      MCHC 32.0 g/dL      RDW 16.0 %      MPV 10.5 fL      Platelets 944 Thousands/uL      nRBC 0 /100 WBCs      Neutrophils Relative 75 %      Immat GRANS % 0 %      Lymphocytes Relative 16 %      Monocytes Relative 9 %      Eosinophils Relative 0 %      Basophils Relative 0 %      Neutrophils Absolute 6.05 Thousands/µL      Immature Grans Absolute 0.03 Thousand/uL      Lymphocytes Absolute 1.26 Thousands/µL      Monocytes Absolute 0.76 Thousand/µL      Eosinophils Absolute 0.03 Thousand/µL      Basophils Absolute 0.02 Thousands/µL     POCT pregnancy, urine [[id number]]  (Normal) Resulted: 07/18/23 1403    Lab Status: Final result Updated: 07/18/23 1403     EXT Preg Test, Ur Negative     Control Valid                 No orders to display              Procedures  Procedures         ED Course  ED Course as of 07/18/23 1500   Tue Jul 18, 2023   1441 On reassessment, patient is non-toxic. I discussed importance of completing antibiotics. I discussed red flags with patient that should prompt return to the emergency department. Patient verbalized understanding and agreed. SBIRT 20yo+    Flowsheet Row Most Recent Value   Initial Alcohol Screen: US AUDIT-C     1. How often do you have a drink containing alcohol? 0 Filed at: 07/18/2023 1406   2. How many drinks containing alcohol do you have on a typical day you are drinking? 0 Filed at: 07/18/2023 1406   3a. Male UNDER 65: How often do you have five or more drinks on one occasion? 0 Filed at: 07/18/2023 1406   3b. FEMALE Any Age, or MALE 65+: How often do you have 4 or more drinks on one occassion? 0 Filed at: 07/18/2023 1406   Audit-C Score 0 Filed at: 07/18/2023 1406   SAILAJA: How many times in the past year have you. .. Used an illegal drug or used a prescription medication for non-medical reasons? Never Filed at: 07/18/2023 1406                    Medical Decision Making  I have the patient.   History concerning for developing pyelonephritis. Will treat aggressively especially in light of diabetes with IV antibiotics. Pain is clearly bilateral, not concerning for infected kidney stone. Pyelonephritis: acute illness or injury  Amount and/or Complexity of Data Reviewed  Labs: ordered. Risk  Prescription drug management. Disposition  Final diagnoses:   Pyelonephritis     Time reflects when diagnosis was documented in both MDM as applicable and the Disposition within this note     Time User Action Codes Description Comment    7/18/2023  2:34 PM Blanche Wilson Add [N12] Pyelonephritis       ED Disposition     ED Disposition   Discharge    Condition   Stable    Date/Time   Tue Jul 18, 2023  2:34 PM    Comment   Sisto Kras discharge to home/self care. Follow-up Information     Follow up With Specialties Details Why Contact Info Additional Information    [facility]   [address] [place] [id number]  [phone] NX [facility]ATH[facility]QU COLEMAN [facility] [place], [place], [address]   [phone]          Discharge Medication List as of 7/18/2023  2:38 PM      START taking these medications    Details   cefdinir (OMNICEF) 300 mg capsule Take 1 capsule (300 mg total) by mouth every 12 (twelve) hours for 7 days, Starting Tue 7/18/2023, Until Tue 7/25/2023, Normal             No discharge procedures on file.     PDMP Review     None          ED Provider  Electronically Signed by           Alverto Rocha MD  07/18/23 0617

## 2023-07-21 LAB
BACTERIA UR CULT: ABNORMAL
BACTERIA UR CULT: ABNORMAL

## 2023-08-25 ENCOUNTER — HOSPITAL ENCOUNTER (EMERGENCY)
Facility: HOSPITAL | Age: 43
Discharge: HOME | End: 2023-08-25
Attending: EMERGENCY MEDICINE

## 2023-08-25 VITALS
SYSTOLIC BLOOD PRESSURE: 100 MMHG | OXYGEN SATURATION: 99 % | DIASTOLIC BLOOD PRESSURE: 60 MMHG | RESPIRATION RATE: 16 BRPM | HEART RATE: 96 BPM | TEMPERATURE: 98.3 F

## 2023-08-25 DIAGNOSIS — N39.0 UTI (URINARY TRACT INFECTION): Primary | ICD-10-CM

## 2023-08-25 LAB
BACTERIA UR QL AUTO: ABNORMAL /HPF
BILIRUB UR QL STRIP: NEGATIVE
CLARITY UR: ABNORMAL
COLOR UR: YELLOW
EXT PREGNANCY TEST URINE: NEGATIVE
EXT. CONTROL: NORMAL
GLUCOSE UR STRIP-MCNC: ABNORMAL MG/DL
HGB UR QL STRIP.AUTO: ABNORMAL
KETONES UR STRIP-MCNC: ABNORMAL MG/DL
LEUKOCYTE ESTERASE UR QL STRIP: ABNORMAL
NITRITE UR QL STRIP: NEGATIVE
NON-SQ EPI CELLS URNS QL MICRO: ABNORMAL /HPF
PH UR STRIP.AUTO: 5.5 [PH]
PROT UR STRIP-MCNC: NEGATIVE MG/DL
RBC #/AREA URNS AUTO: ABNORMAL /HPF
SP GR UR STRIP.AUTO: 1.02 (ref 1–1.03)
UROBILINOGEN UR QL STRIP.AUTO: 0.2 E.U./DL
WBC #/AREA URNS AUTO: ABNORMAL /HPF

## 2023-08-25 PROCEDURE — 99284 EMERGENCY DEPT VISIT MOD MDM: CPT | Performed by: EMERGENCY MEDICINE

## 2023-08-25 PROCEDURE — 87086 URINE CULTURE/COLONY COUNT: CPT | Performed by: EMERGENCY MEDICINE

## 2023-08-25 PROCEDURE — 81003 URINALYSIS AUTO W/O SCOPE: CPT | Performed by: EMERGENCY MEDICINE

## 2023-08-25 PROCEDURE — 99283 EMERGENCY DEPT VISIT LOW MDM: CPT

## 2023-08-25 PROCEDURE — 87147 CULTURE TYPE IMMUNOLOGIC: CPT | Performed by: EMERGENCY MEDICINE

## 2023-08-25 PROCEDURE — 87186 SC STD MICRODIL/AGAR DIL: CPT | Performed by: EMERGENCY MEDICINE

## 2023-08-25 PROCEDURE — 81025 URINE PREGNANCY TEST: CPT | Performed by: EMERGENCY MEDICINE

## 2023-08-25 PROCEDURE — 81001 URINALYSIS AUTO W/SCOPE: CPT | Performed by: EMERGENCY MEDICINE

## 2023-08-25 RX ORDER — CEPHALEXIN 250 MG/1
500 CAPSULE ORAL ONCE
Status: COMPLETED | OUTPATIENT
Start: 2023-08-25 | End: 2023-08-25

## 2023-08-25 RX ORDER — CEPHALEXIN 500 MG/1
500 CAPSULE ORAL EVERY 12 HOURS SCHEDULED
Qty: 14 CAPSULE | Refills: 0 | Status: SHIPPED | OUTPATIENT
Start: 2023-08-25 | End: 2023-09-01

## 2023-08-25 RX ADMIN — CEPHALEXIN 500 MG: 250 CAPSULE ORAL at 13:30

## 2023-08-25 NOTE — ED PROVIDER NOTES
History  Chief Complaint   Patient presents with   • Flank Pain     Pt reports recent UTI, now having abdominal discomfort and lower back pain. Pt reports urine is cloudy, malodorous. Pt was prescribed antibiotics and states it did not help     21-year-old female presents to the emergency department for evaluation of a suspected urinary tract infection. The patient is Polish-speaking only and an  was used for translation. Triage note states that the patient was complaining of flank pain and states that the patient was previously on antibiotics that did not help. Through the  the patient denies this to me. She states that she was seen in an emergency department approximately a month ago and was having flank pain and similar UTI symptoms but those symptoms resolved after the antibiotics. She states that over the past several days her dysuria has returned and that her urine appears cloudy and malodorous. The patient is unsure of what antibiotics she was previously on. She has not been taking any medications to treat her current symptoms. She denies fevers, chills, nausea, vomiting, diarrhea, diaphoresis, vaginal bleeding, vaginal discharge and genital lesions. Prior to Admission Medications   Prescriptions Last Dose Informant Patient Reported? Taking?    medroxyPROGESTERone (DEPO-PROVERA) 150 mg/mL injection   No No   Sig: Inject 1 mL (150 mg total) into a muscle every 3 (three) months   metFORMIN (GLUCOPHAGE) 500 mg tablet   Yes No   Sig: Take 500 mg by mouth 2 (two) times a day with meals   metFORMIN (GLUCOPHAGE) 500 mg tablet   No No   Sig: Take 1 tablet (500 mg total) by mouth 2 (two) times a day with meals      Facility-Administered Medications Last Administration Doses Remaining   medroxyPROGESTERone (DEPO-PROVERA) IM injection 150 mg 4/21/2023 11:41 AM           Past Medical History:   Diagnosis Date   • Diabetes mellitus ([address])    • Disease of thyroid gland Past Surgical History:   Procedure Laterality Date   •  SECTION         Family History   Problem Relation Age of Onset   • Diabetes Mother    • No Known Problems Father    • Diabetes Sister    • No Known Problems Brother    • No Known Problems Brother    • No Known Problems Son    • No Known Problems Son    • Diabetes Maternal Grandmother    • Breast cancer Neg Hx    • Colon cancer Neg Hx    • Ovarian cancer Neg Hx      I have reviewed and agree with the history as documented. E-Cigarette/Vaping   • E-Cigarette Use Never User      E-Cigarette/Vaping Substances   • Nicotine No    • THC No    • CBD No    • Flavoring No    • Other No    • Unknown No      Social History     Tobacco Use   • Smoking status: Never   • Smokeless tobacco: Never   Vaping Use   • Vaping Use: Never used   Substance Use Topics   • Alcohol use: Not Currently     Comment: socially   • Drug use: Never       Review of Systems   Constitutional: Negative for chills and fever. HENT: Negative for ear pain and sore throat. Eyes: Negative for pain and visual disturbance. Respiratory: Negative for cough and shortness of breath. Cardiovascular: Negative for chest pain and palpitations. Gastrointestinal: Negative for abdominal pain and vomiting. Genitourinary: Positive for dysuria. Negative for flank pain and hematuria. Musculoskeletal: Negative for arthralgias and back pain. Skin: Negative for color change and rash. Neurological: Negative for seizures and syncope. All other systems reviewed and are negative. Physical Exam  Physical Exam  Vitals and nursing note reviewed. Constitutional:       General: She is not in acute distress. Appearance: She is well-developed. HENT:      Head: Normocephalic and atraumatic. Eyes:      Conjunctiva/sclera: Conjunctivae normal.   Cardiovascular:      Rate and Rhythm: Normal rate and regular rhythm. Heart sounds: No murmur heard.   Pulmonary:      Effort: Pulmonary effort is normal. No respiratory distress. Breath sounds: Normal breath sounds. Abdominal:      Palpations: Abdomen is soft. Tenderness: There is no abdominal tenderness. There is no right CVA tenderness or left CVA tenderness. Musculoskeletal:         General: No swelling. Cervical back: Neck supple. Skin:     General: Skin is warm and dry. Capillary Refill: Capillary refill takes less than 2 seconds. Neurological:      Mental Status: She is alert. Psychiatric:         Mood and Affect: Mood normal.         Vital Signs  ED Triage Vitals [08/25/23 1219]   Temperature Pulse Respirations Blood Pressure SpO2   98.3 °F (36.8 °C) 96 16 100/60 99 %      Temp Source Heart Rate Source Patient Position - Orthostatic VS BP Location FiO2 (%)   Oral -- -- -- --      Pain Score       --           Vitals:    08/25/23 1219   BP: 100/60   Pulse: 96         Visual Acuity      ED Medications  Medications   cephalexin (KEFLEX) capsule 500 mg (500 mg Oral Given 8/25/23 1330)       Diagnostic Studies  Results Reviewed     Procedure Component Value Units Date/Time    Urine Microscopic [[id number]]  (Abnormal) Collected: 08/25/23 1247    Lab Status: Final result Specimen: Urine, Clean Catch Updated: 08/25/23 1310     RBC, UA 2-4 /hpf      WBC, UA 30-50 /hpf      Epithelial Cells Occasional /hpf      Bacteria, UA Occasional /hpf     Urine culture [[id number]] Collected: 08/25/23 1247    Lab Status:  In process Specimen: Urine, Clean Catch Updated: 08/25/23 1310    UA w Reflex to Microscopic w Reflex to Culture [[id number]]  (Abnormal) Collected: 08/25/23 1247    Lab Status: Final result Specimen: Urine, Clean Catch Updated: 08/25/23 1259     Color, UA Yellow     Clarity, UA Slightly Cloudy     Specific Gravity, UA 1.020     pH, UA 5.5     Leukocytes, UA 1+     Nitrite, UA Negative     Protein, UA Negative mg/dl      Glucose, UA 2+ mg/dl      Ketones, UA 15 (1+) mg/dl      Urobilinogen, UA 0.2 E.U./dl Bilirubin, UA Negative     Occult Blood, UA 2+    POCT pregnancy, urine [[id number]]  (Normal) Resulted: 08/25/23 1253    Lab Status: Final result Updated: 08/25/23 1253     EXT Preg Test, Ur Negative     Control Valid                 No orders to display              Procedures  Procedures         ED Course                 SBIRT 22yo+    Flowsheet Row Most Recent Value   Initial Alcohol Screen: US AUDIT-C     1. How often do you have a drink containing alcohol? 0 Filed at: 08/25/2023 1231   2. How many drinks containing alcohol do you have on a typical day you are drinking? 0 Filed at: 08/25/2023 1231   3a. Male UNDER 65: How often do you have five or more drinks on one occasion? 0 Filed at: 08/25/2023 1231   3b. FEMALE Any Age, or MALE 65+: How often do you have 4 or more drinks on one occassion? 0 Filed at: 08/25/2023 1231   Audit-C Score 0 Filed at: 08/25/2023 1231   SAMI: How many times in the past year have you. .. Used an illegal drug or used a prescription medication for non-medical reasons? Never Filed at: 08/25/2023 1231                    Medical Decision Making  18-year-old female presented to the emergency department for evaluation of a suspected urinary tract infection. On arrival the patient was awake, alert, oriented and in no acute distress. Vital signs within normal limits. Physical exam was unremarkable including a benign abdominal examination. Patient had no CVA tenderness. Urinalysis with 30-50 WBCs on microscopic examination with occasional bacteria. Urine culture is pending. We will treat suspected UTI with Keflex. The patient was provided with a dose here in the emergency department and given a prescription for a 7-day course. Recommendation was made for the patient to establish care with a PCP and to follow-up for continued symptoms. The patient was provided with follow-up information. Return precautions were discussed.     Patient agrees with the plan for discharge and feels comfortable to go home with proper f/u. Advised to return for worsening or additional problems. Diagnostic tests were reviewed and questions answered. Diagnosis, care plan and treatment options were discussed. The patient understands instructions and will follow up as directed. UTI (urinary tract infection): acute illness or injury  Amount and/or Complexity of Data Reviewed  Labs: ordered. Risk  Prescription drug management. Disposition  Final diagnoses:   UTI (urinary tract infection)     Time reflects when diagnosis was documented in both MDM as applicable and the Disposition within this note     Time User Action Codes Description Comment    8/25/2023  1:24 PM Tatiana Pandya Add [N39.0] UTI (urinary tract infection)       ED Disposition     ED Disposition   Discharge    Condition   Stable    Date/Time   Fri Aug 25, 2023  1:24 PM    [address] [place] [address] discharge to home/self care.                Follow-up Information     Follow up With Specialties Details Why Contact Info Additional Information    Infolink  Call  To establish care with a PCP [address] Emergency Department Emergency Medicine Go to  If symptoms worsen [address] [phone]  [facility] Emergency Department, [address] [place] [address] Dr, [address] [facility]          Discharge Medication List as of 8/25/2023  1:26 PM      START taking these medications    Details   cephalexin (KEFLEX) 500 mg capsule Take 1 capsule (500 mg total) by mouth every 12 (twelve) hours for 7 days, Starting Fri 8/25/2023, Until Fri 9/1/2023, Normal         CONTINUE these medications which have NOT CHANGED    Details   medroxyPROGESTERone (DEPO-PROVERA) 150 mg/mL injection Inject 1 mL (150 mg total) into a muscle every 3 (three) months, Starting Mon 1/16/2023, Normal      metFORMIN (GLUCOPHAGE) 500 mg tablet Take 500 mg by mouth 2 (two) times a day with meals, Historical Med             No discharge procedures on file.     PDMP Review     None          ED Provider  Electronically Signed by           Addis Rodriguez MD  08/25/23 9719

## 2023-08-27 LAB — BACTERIA UR CULT: ABNORMAL

## 2024-01-03 ENCOUNTER — HOSPITAL ENCOUNTER (EMERGENCY)
Facility: HOSPITAL | Age: 44
Discharge: HOME | End: 2024-01-04
Attending: EMERGENCY MEDICINE

## 2024-01-03 ENCOUNTER — APPOINTMENT (EMERGENCY)
Dept: RADIOLOGY | Facility: HOSPITAL | Age: 44
End: 2024-01-03

## 2024-01-03 VITALS
OXYGEN SATURATION: 99 % | TEMPERATURE: 98.5 F | DIASTOLIC BLOOD PRESSURE: 76 MMHG | SYSTOLIC BLOOD PRESSURE: 135 MMHG | HEART RATE: 98 BPM | RESPIRATION RATE: 16 BRPM

## 2024-01-03 DIAGNOSIS — E11.9 DIABETES (HCC): ICD-10-CM

## 2024-01-03 DIAGNOSIS — Z75.8 DOES NOT HAVE PRIMARY CARE PROVIDER: ICD-10-CM

## 2024-01-03 DIAGNOSIS — R05.9 COUGH: ICD-10-CM

## 2024-01-03 DIAGNOSIS — Z76.0 MEDICATION REFILL: ICD-10-CM

## 2024-01-03 DIAGNOSIS — U07.1 COVID-19: Primary | ICD-10-CM

## 2024-01-03 LAB
ANION GAP SERPL CALCULATED.3IONS-SCNC: 12 MMOL/L
BASOPHILS # BLD AUTO: 0.01 THOUSANDS/ÂΜL (ref 0–0.1)
BASOPHILS NFR BLD AUTO: 0 % (ref 0–1)
BUN SERPL-MCNC: 11 MG/DL (ref 5–25)
CALCIUM SERPL-MCNC: 8.9 MG/DL (ref 8.4–10.2)
CHLORIDE SERPL-SCNC: 104 MMOL/L (ref 96–108)
CO2 SERPL-SCNC: 16 MMOL/L (ref 21–32)
CREAT SERPL-MCNC: 0.56 MG/DL (ref 0.6–1.3)
EOSINOPHIL # BLD AUTO: 0.05 THOUSAND/ÂΜL (ref 0–0.61)
EOSINOPHIL NFR BLD AUTO: 1 % (ref 0–6)
ERYTHROCYTE [DISTWIDTH] IN BLOOD BY AUTOMATED COUNT: 12.5 % (ref 11.6–15.1)
GFR SERPL CREATININE-BSD FRML MDRD: 115 ML/MIN/1.73SQ M
GLUCOSE SERPL-MCNC: 285 MG/DL (ref 65–140)
HCT VFR BLD AUTO: 41 % (ref 34.8–46.1)
HGB BLD-MCNC: 13.7 G/DL (ref 11.5–15.4)
IMM GRANULOCYTES # BLD AUTO: 0.04 THOUSAND/UL (ref 0–0.2)
IMM GRANULOCYTES NFR BLD AUTO: 1 % (ref 0–2)
LYMPHOCYTES # BLD AUTO: 1.31 THOUSANDS/ÂΜL (ref 0.6–4.47)
LYMPHOCYTES NFR BLD AUTO: 28 % (ref 14–44)
MCH RBC QN AUTO: 29.3 PG (ref 26.8–34.3)
MCHC RBC AUTO-ENTMCNC: 33.4 G/DL (ref 31.4–37.4)
MCV RBC AUTO: 88 FL (ref 82–98)
MONOCYTES # BLD AUTO: 0.55 THOUSAND/ÂΜL (ref 0.17–1.22)
MONOCYTES NFR BLD AUTO: 12 % (ref 4–12)
NEUTROPHILS # BLD AUTO: 2.81 THOUSANDS/ÂΜL (ref 1.85–7.62)
NEUTS SEG NFR BLD AUTO: 58 % (ref 43–75)
NRBC BLD AUTO-RTO: 0 /100 WBCS
PLATELET # BLD AUTO: 184 THOUSANDS/UL (ref 149–390)
PMV BLD AUTO: 10.3 FL (ref 8.9–12.7)
POTASSIUM SERPL-SCNC: 3.7 MMOL/L (ref 3.5–5.3)
RBC # BLD AUTO: 4.68 MILLION/UL (ref 3.81–5.12)
SODIUM SERPL-SCNC: 132 MMOL/L (ref 135–147)
WBC # BLD AUTO: 4.77 THOUSAND/UL (ref 4.31–10.16)

## 2024-01-03 PROCEDURE — 36415 COLL VENOUS BLD VENIPUNCTURE: CPT | Performed by: EMERGENCY MEDICINE

## 2024-01-03 PROCEDURE — 96374 THER/PROPH/DIAG INJ IV PUSH: CPT

## 2024-01-03 PROCEDURE — 99283 EMERGENCY DEPT VISIT LOW MDM: CPT

## 2024-01-03 PROCEDURE — 0241U HB NFCT DS VIR RESP RNA 4 TRGT: CPT | Performed by: EMERGENCY MEDICINE

## 2024-01-03 PROCEDURE — 71045 X-RAY EXAM CHEST 1 VIEW: CPT

## 2024-01-03 PROCEDURE — 96361 HYDRATE IV INFUSION ADD-ON: CPT

## 2024-01-03 PROCEDURE — 93005 ELECTROCARDIOGRAM TRACING: CPT

## 2024-01-03 PROCEDURE — 99284 EMERGENCY DEPT VISIT MOD MDM: CPT | Performed by: EMERGENCY MEDICINE

## 2024-01-03 PROCEDURE — 80048 BASIC METABOLIC PNL TOTAL CA: CPT | Performed by: EMERGENCY MEDICINE

## 2024-01-03 PROCEDURE — 85025 COMPLETE CBC W/AUTO DIFF WBC: CPT | Performed by: EMERGENCY MEDICINE

## 2024-01-03 RX ORDER — KETOROLAC TROMETHAMINE 30 MG/ML
15 INJECTION, SOLUTION INTRAMUSCULAR; INTRAVENOUS ONCE
Status: COMPLETED | OUTPATIENT
Start: 2024-01-03 | End: 2024-01-03

## 2024-01-03 RX ADMIN — KETOROLAC TROMETHAMINE 15 MG: 30 INJECTION, SOLUTION INTRAMUSCULAR at 23:25

## 2024-01-03 RX ADMIN — SODIUM CHLORIDE 1000 ML: 0.9 INJECTION, SOLUTION INTRAVENOUS at 23:24

## 2024-01-03 NOTE — Clinical Note
Gabby Olivera was seen and treated in our emergency department on 1/3/2024.    No restrictions    Other - See Comments        Diagnosis: COVID-19    Gabby  is off the rest of the shift today, may return to work on return date.    She may return on this date: 01/08/2024         If you have any questions or concerns, please don't hesitate to call.      Moris Serrano MD    ______________________________           _______________          _______________  Hospital Representative                              Date                                Time

## 2024-01-04 LAB
ATRIAL RATE: 93 BPM
FLUAV RNA RESP QL NAA+PROBE: NEGATIVE
FLUBV RNA RESP QL NAA+PROBE: NEGATIVE
P AXIS: 46 DEGREES
PR INTERVAL: 146 MS
QRS AXIS: 43 DEGREES
QRSD INTERVAL: 88 MS
QT INTERVAL: 370 MS
QTC INTERVAL: 460 MS
RSV RNA RESP QL NAA+PROBE: NEGATIVE
SARS-COV-2 RNA RESP QL NAA+PROBE: POSITIVE
T WAVE AXIS: 4 DEGREES
VENTRICULAR RATE: 93 BPM

## 2024-01-04 PROCEDURE — 96361 HYDRATE IV INFUSION ADD-ON: CPT

## 2024-01-04 NOTE — ED CARE HANDOFF
Emergency Department Sign Out Note        Sign out and transfer of care from Dr. Carcamo. See Separate Emergency Department note.     The patient, Gabby Olivera, was evaluated by the previous provider for viral URI symptoms.    Workup Completed:  Initial evaluation, CBC, BMP, COVID/flu/RSV test (pending at time of signout) and chest x-ray (pending at time of signout)    ED Course / Workup Pending (followup):                                    ED Course as of 01/04/24 0105   Thu Jan 04, 2024   0014 XR chest 1 view portable  No acute cardiopulmonary disease process on my interpretation.   0014 CBC and differential  All WNL   0014 Basic metabolic panel(!)  Sodium 132, bicarb 16, serum glucose 285 in the setting of known diabetes.  Otherwise all within normal limits and no acute actionable abnormality.   0014 FLU/RSV/COVID - if FLU/RSV clinically relevant(!)  Positive for COVID-19, otherwise negative for influenza and RSV   0015 Procedure Note: EKG  Date/Time: 01/03/24 11:33 AM   Interpreted by: Moris Serrano MD  Indications / Diagnosis: Chest tightness, cough  ECG reviewed by me, the ED Physician: yes   The EKG demonstrates:  Rhythm: normal sinus rhythm 93 bpm  Intervals: normal intervals  Axis: normal axis  QRS/Blocks: normal QRS  ST Changes: Nonspecific ST-T wave changes.  No STEMI.     Procedures  Medical Decision Making  The patient was initially seen by Dr. Carcamo and I received the patient in signout at end of shift.  Patient is a 43-year-old female presenting for evaluation of viral URI symptoms over the last few days.  She reports experiencing a nonproductive cough, chest tightness, myalgias, and flulike symptoms.  The patient is primarily Urdu-speaking,  services utilized for encounter and review of results.  See ED course for independent interpretation of results.  Workup is positive for COVID-19.  I discussed with the patient regarding symptomatic management.  Patient also has  a history of diabetes and does not have a local primary care provider.  Will provide refill of her metformin and a referral for local primary care. I discussed all findings, treatment, red flags/return precautions, and outpatient follow-up and the patient/family understand and agree. Stable for discharge.    Amount and/or Complexity of Data Reviewed  Labs: ordered. Decision-making details documented in ED Course.  Radiology: ordered and independent interpretation performed. Decision-making details documented in ED Course.    Risk  Prescription drug management.            Disposition  Final diagnoses:   COVID-19   Cough   Diabetes (HCC)   Does not have primary care provider     Time reflects when diagnosis was documented in both MDM as applicable and the Disposition within this note       Time User Action Codes Description Comment    1/4/2024 12:48 AM Moris Serrano [U07.1] COVID-19     1/4/2024 12:48 AM Moris Serrano [R05.9] Cough     1/4/2024 12:48 AM Moris Serrano [E11.9] Diabetes (HCC)     1/4/2024 12:52 AM Moris Serrano [Z75.8] Does not have primary care provider     1/4/2024 12:52 AM Moris Serrano [Z76.0] Medication refill DM          ED Disposition       ED Disposition   Discharge    Condition   Stable    Date/Time   Thu Jan 4, 2024 12:53 AM    Comment   Gabby Olivera discharge to home/self care.                   Follow-up Information       Follow up With Specialties Details Why Contact Info Additional Information    [facility] Family Medicine Call in 1 week For follow-up [address] [place] [id number] [phone] [facility], [address] [place] [address], [id number] [phone]    [facility] Emergency Department Emergency Medicine Go to  If symptoms worsen [address] [place] [id number] [phone] [facility] Emergency Department, [address]  [place] [address]          Discharge Medication List as of 1/4/2024 12:54 AM        CONTINUE these medications which have CHANGED    Details   !! metFORMIN (GLUCOPHAGE) 500 mg tablet Take 1 tablet (500 mg total) by mouth 2 (two) times a day with meals, Starting Thu 1/4/2024, Normal      !! metFORMIN (GLUCOPHAGE) 500 mg tablet Take 1 tablet (500 mg total) by mouth 2 (two) times a day with meals, Starting Thu 1/4/2024, Until Sat 2/3/2024, Normal       !! - Potential duplicate medications found. Please discuss with provider.        CONTINUE these medications which have NOT CHANGED    Details   medroxyPROGESTERone (DEPO-PROVERA) 150 mg/mL injection Inject 1 mL (150 mg total) into a muscle every 3 (three) months, Starting Mon 1/16/2023, Normal                  ED Provider  Electronically Signed by     Moris Serrano MD  01/04/24 0109

## 2024-01-04 NOTE — ED PROVIDER NOTES
"History  No chief complaint on file.    43-year-old female comes in for evaluation of flulike symptoms.  Patient states that she has had a cough congestion and fever for approximately 3 days.  States she feels like she did when she had COVID previously.  Patient concerned that she states her heart \"races\" when she ambulates.  Patient is also diabetic but is not taking her metformin.      History provided by:  Patient and medical records   used: Yes    Flu Symptoms  Presenting symptoms: cough, fatigue, fever and myalgias    Presenting symptoms: no diarrhea, no headaches and no shortness of breath    Severity:  Moderate  Onset quality:  Sudden  Duration:  3 days  Progression:  Worsening  Chronicity:  New  Ineffective treatments:  None tried  Associated symptoms: decreased appetite, decreased physical activity and nasal congestion    Associated symptoms: no ear pain    Risk factors: diabetes and sick contacts        Prior to Admission Medications   Prescriptions Last Dose Informant Patient Reported? Taking?   medroxyPROGESTERone (DEPO-PROVERA) 150 mg/mL injection   No No   Sig: Inject 1 mL (150 mg total) into a muscle every 3 (three) months   metFORMIN (GLUCOPHAGE) 500 mg tablet   Yes No   Sig: Take 500 mg by mouth 2 (two) times a day with meals   metFORMIN (GLUCOPHAGE) 500 mg tablet   No No   Sig: Take 1 tablet (500 mg total) by mouth 2 (two) times a day with meals   metFORMIN (GLUCOPHAGE) 500 mg tablet   No Yes   Sig: Take 1 tablet (500 mg total) by mouth 2 (two) times a day with meals   metFORMIN (GLUCOPHAGE) 500 mg tablet   No Yes   Sig: Take 1 tablet (500 mg total) by mouth 2 (two) times a day with meals      Facility-Administered Medications Last Administration Doses Remaining   medroxyPROGESTERone (DEPO-PROVERA) IM injection 150 mg 4/21/2023 11:41 AM           Past Medical History:   Diagnosis Date    Diabetes mellitus (HCC)     Disease of thyroid gland        Past Surgical History: "   Procedure Laterality Date     SECTION         Family History   Problem Relation Age of Onset    Diabetes Mother     No Known Problems Father     Diabetes Sister     No Known Problems Brother     No Known Problems Brother     No Known Problems Son     No Known Problems Son     Diabetes Maternal Grandmother     Breast cancer Neg Hx     Colon cancer Neg Hx     Ovarian cancer Neg Hx      I have reviewed and agree with the history as documented.    E-Cigarette/Vaping    E-Cigarette Use Never User      E-Cigarette/Vaping Substances    Nicotine No     THC No     CBD No     Flavoring No     Other No     Unknown No      Social History     Tobacco Use    Smoking status: Never    Smokeless tobacco: Never   Vaping Use    Vaping status: Never Used   Substance Use Topics    Alcohol use: Not Currently     Comment: socially    Drug use: Never       Review of Systems   Constitutional:  Positive for decreased appetite, fatigue and fever.   HENT:  Positive for congestion. Negative for ear pain.    Eyes:  Negative for discharge and redness.   Respiratory:  Positive for cough. Negative for apnea, shortness of breath and wheezing.    Cardiovascular:  Negative for chest pain.   Gastrointestinal:  Negative for abdominal pain and diarrhea.   Endocrine: Negative for cold intolerance and polydipsia.   Genitourinary:  Negative for difficulty urinating and hematuria.   Musculoskeletal:  Positive for myalgias. Negative for arthralgias and back pain.   Skin:  Negative for color change and rash.   Allergic/Immunologic: Negative for environmental allergies and immunocompromised state.   Neurological:  Negative for numbness and headaches.   Hematological:  Negative for adenopathy. Does not bruise/bleed easily.   Psychiatric/Behavioral:  Negative for agitation and behavioral problems.        Physical Exam  Physical Exam  Vitals and nursing note reviewed.   Constitutional:       Appearance: Normal appearance. She is well-developed. She is  not toxic-appearing.   HENT:      Head: Normocephalic and atraumatic.      Right Ear: Tympanic membrane and external ear normal.      Left Ear: Tympanic membrane and external ear normal.      Nose: Congestion and rhinorrhea present. No nasal deformity.      Mouth/Throat:      Dentition: Normal dentition.      Pharynx: Uvula midline.   Eyes:      General: Lids are normal.         Right eye: No discharge.         Left eye: No discharge.      Conjunctiva/sclera: Conjunctivae normal.      Pupils: Pupils are equal, round, and reactive to light.   Neck:      Vascular: No carotid bruit or JVD.      Trachea: Trachea normal.   Cardiovascular:      Rate and Rhythm: Normal rate and regular rhythm. No extrasystoles are present.     Chest Wall: PMI is not displaced.      Pulses: Normal pulses.   Pulmonary:      Effort: Pulmonary effort is normal. No accessory muscle usage or respiratory distress.      Breath sounds: Normal breath sounds. No wheezing, rhonchi or rales.   Abdominal:      General: Bowel sounds are normal.      Palpations: Abdomen is soft. Abdomen is not rigid. There is no mass.      Tenderness: There is no abdominal tenderness. There is no guarding or rebound.   Musculoskeletal:      Right shoulder: No swelling, deformity or bony tenderness. Normal range of motion.      Cervical back: Normal range of motion and neck supple. No deformity, tenderness or bony tenderness.   Lymphadenopathy:      Cervical: No cervical adenopathy.   Skin:     General: Skin is warm and dry.      Findings: No rash.   Neurological:      Mental Status: She is alert and oriented to person, place, and time.      GCS: GCS eye subscore is 4. GCS verbal subscore is 5. GCS motor subscore is 6.      Cranial Nerves: No cranial nerve deficit.      Sensory: No sensory deficit.      Deep Tendon Reflexes: Reflexes are normal and symmetric.   Psychiatric:         Speech: Speech normal.         Behavior: Behavior normal.         Vital Signs  ED Triage  Vitals   Temperature Pulse Respirations Blood Pressure SpO2   01/03/24 2314 01/03/24 2312 01/03/24 2312 01/03/24 2312 01/03/24 2312   98.5 °F (36.9 °C) (!) 107 16 135/76 99 %      Temp src Heart Rate Source Patient Position - Orthostatic VS BP Location FiO2 (%)   -- -- -- -- --             Pain Score       --                  Vitals:    01/03/24 2312 01/03/24 2313   BP: 135/76    Pulse: (!) 107 98         Visual Acuity      ED Medications  Medications   sodium chloride 0.9 % bolus 1,000 mL (0 mL Intravenous Stopped 1/4/24 0058)   ketorolac (TORADOL) injection 15 mg (15 mg Intravenous Given 1/3/24 2325)       Diagnostic Studies  Results Reviewed       Procedure Component Value Units Date/Time    FLU/RSV/COVID - if FLU/RSV clinically relevant [[id number]]  (Abnormal) Collected: 01/03/24 2320    Lab Status: Final result Specimen: Nares from Nose Updated: 01/04/24 0003     SARS-CoV-2 Positive     INFLUENZA A PCR Negative     INFLUENZA B PCR Negative     RSV PCR Negative    Narrative:      FOR PEDIATRIC PATIENTS - copy/paste COVID Guidelines URL to browser: [email or web address]    SARS-CoV-2 assay is a Nucleic Acid Amplification assay intended for the  qualitative detection of nucleic acid from SARS-CoV-2 in nasopharyngeal  swabs. Results are for the presumptive identification of SARS-CoV-2 RNA.    Positive results are indicative of infection with SARS-CoV-2, the virus  causing COVID-19, but do not rule out bacterial infection or co-infection  with other viruses. Laboratories within the [place] and its  territories are required to report all positive results to the appropriate  public health authorities. Negative results do not preclude SARS-CoV-2  infection and should not be used as the sole basis for treatment or other  patient management decisions. Negative results must be combined with  clinical observations, patient history, and epidemiological  information.  This test has not been FDA cleared or approved.    This test has been authorized by FDA under an Emergency Use Authorization  (EUA). This test is only authorized for the duration of time the  declaration that circumstances exist justifying the authorization of the  emergency use of an in vitro diagnostic tests for detection of SARS-CoV-2  virus and/or diagnosis of COVID-19 infection under section 564(b)(1) of  the Act, 21 U.S.C. 360bbb-3(b)(1), unless the authorization is terminated  or revoked sooner. The test has been validated but independent review by FDA  and CLIA is pending.    Test performed using [employer] GeneXpert: This RT-PCR assay targets N2,  a region unique to SARS-CoV-2. A conserved region in the E-gene was chosen  for pan-Sarbecovirus detection which includes SARS-CoV-2.    According to CMS-2020-01-R, this platform meets the definition of high-throughput technology.    Basic metabolic panel [[id number]]  (Abnormal) Collected: 01/03/24 2320    Lab Status: Final result Specimen: Blood from Arm, Left Updated: 01/03/24 2344     Sodium 132 mmol/L      Potassium 3.7 mmol/L      Chloride 104 mmol/L      CO2 16 mmol/L      ANION GAP 12 mmol/L      BUN 11 mg/dL      Creatinine 0.56 mg/dL      Glucose 285 mg/dL      Calcium 8.9 mg/dL      eGFR 115 ml/min/1.73sq m     Narrative:      National Kidney Disease Foundation guidelines for Chronic Kidney Disease (CKD):     Stage 1 with normal or high GFR (GFR > 90 mL/min/1.73 square meters)    Stage 2 Mild CKD (GFR = 60-89 mL/min/1.73 square meters)    Stage 3A Moderate CKD (GFR = 45-59 mL/min/1.73 square meters)    Stage 3B Moderate CKD (GFR = 30-44 mL/min/1.73 square meters)    Stage 4 Severe CKD (GFR = 15-29 mL/min/1.73 square meters)    Stage 5 End Stage CKD (GFR <15 mL/min/1.73 square meters)  Note: GFR calculation is accurate only with a steady state creatinine    CBC and differential [[id number]] Collected: 01/03/24 2320    Lab Status: Final result  Specimen: Blood from Arm, Left Updated: 01/03/24 2326     WBC 4.77 Thousand/uL      RBC 4.68 Million/uL      Hemoglobin 13.7 g/dL      Hematocrit 41.0 %      MCV 88 fL      MCH 29.3 pg      MCHC 33.4 g/dL      RDW 12.5 %      MPV 10.3 fL      Platelets 184 Thousands/uL      nRBC 0 /100 WBCs      Neutrophils Relative 58 %      Immat GRANS % 1 %      Lymphocytes Relative 28 %      Monocytes Relative 12 %      Eosinophils Relative 1 %      Basophils Relative 0 %      Neutrophils Absolute 2.81 Thousands/µL      Immature Grans Absolute 0.04 Thousand/uL      Lymphocytes Absolute 1.31 Thousands/µL      Monocytes Absolute 0.55 Thousand/µL      Eosinophils Absolute 0.05 Thousand/µL      Basophils Absolute 0.01 Thousands/µL                    XR chest 1 view portable   ED Interpretation by Moris Serrano MD (01/04 0014)   No acute cardiopulmonary disease process on my interpretation.                 Procedures  Procedures         ED Course                                             Medical Decision Making  Differential diagnosis includes COVID flu RSV other respiratory virus    Problems Addressed:  Cough: acute illness or injury  COVID-19: acute illness or injury  Diabetes (HCC): chronic illness or injury that poses a threat to life or bodily functions     Details: Patient noncompliant with medication.  Does not have primary care provider: chronic illness or injury     Details: Patient has been given number for PCP to contact and make an appointment in the past    Amount and/or Complexity of Data Reviewed  Labs: ordered. Decision-making details documented in ED Course.  Radiology: ordered and independent interpretation performed.    Risk  OTC drugs.  Prescription drug management.             Disposition  Final diagnoses:   COVID-19   Cough   Diabetes (HCC)   Does not have primary care provider     Time reflects when diagnosis was documented in both MDM as applicable and the Disposition within this note       Time User Action  Codes Description Comment    1/4/2024 12:48 AM Moris Serrano [U07.1] COVID-19     1/4/2024 12:48 AM Moris Serrano [R05.9] Cough     1/4/2024 12:48 AM Moris Serrano [E11.9] Diabetes (HCC)     1/4/2024 12:52 AM Moris Serrano [Z75.8] Does not have primary care provider     1/4/2024 12:52 AM Moris Serrano [Z76.0] Medication refill DM          ED Disposition       ED Disposition   Discharge    Condition   Stable    Date/Time   Thu Jan 4, 2024 12:53 AM    Comment   Gabby Olivera discharge to home/self care.                   Follow-up Information       Follow up With Specialties Details Why Contact Info Additional Information    [facility] Family Medicine Call in 1 week For follow-up [address] [place] [address] [phone] [facility], [address] [place] [address], [address] [phone]    [facility] Emergency Department Emergency Medicine Go to  If symptoms worsen [address] [address] [phone] [facility] Emergency Department, [address] [address]            Discharge Medication List as of 1/4/2024 12:54 AM        CONTINUE these medications which have CHANGED    Details   !! metFORMIN (GLUCOPHAGE) 500 mg tablet Take 1 tablet (500 mg total) by mouth 2 (two) times a day with meals, Starting Thu 1/4/2024, Normal      !! metFORMIN (GLUCOPHAGE) 500 mg tablet Take 1 tablet (500 mg total) by mouth 2 (two) times a day with meals, Starting Thu 1/4/2024, Until Sat 2/3/2024, Normal       !! - Potential duplicate medications found. Please discuss with provider.        CONTINUE these medications which have NOT CHANGED    Details   medroxyPROGESTERone (DEPO-PROVERA) 150 mg/mL injection Inject 1 mL (150 mg total) into a muscle every 3 (three) months, Starting Mon 1/16/2023, Normal                 PDMP Review       None            ED  Provider  Electronically Signed by             Anjelica Carcamo,   01/04/24 2269

## 2024-01-26 ENCOUNTER — HOSPITAL ENCOUNTER (EMERGENCY)
Facility: HOSPITAL | Age: 44
Discharge: HOME | End: 2024-01-26
Attending: EMERGENCY MEDICINE

## 2024-01-26 VITALS
RESPIRATION RATE: 18 BRPM | TEMPERATURE: 98.2 F | SYSTOLIC BLOOD PRESSURE: 128 MMHG | DIASTOLIC BLOOD PRESSURE: 83 MMHG | HEART RATE: 108 BPM | OXYGEN SATURATION: 100 %

## 2024-01-26 DIAGNOSIS — N39.0 UTI (URINARY TRACT INFECTION): Primary | ICD-10-CM

## 2024-01-26 DIAGNOSIS — E03.9 HYPOTHYROID: ICD-10-CM

## 2024-01-26 DIAGNOSIS — E11.9 DIABETES (HCC): ICD-10-CM

## 2024-01-26 LAB
ANION GAP SERPL CALCULATED.3IONS-SCNC: 10 MMOL/L
BACTERIA UR QL AUTO: ABNORMAL /HPF
BASOPHILS # BLD AUTO: 0.02 THOUSANDS/ÂΜL (ref 0–0.1)
BASOPHILS NFR BLD AUTO: 0 % (ref 0–1)
BILIRUB UR QL STRIP: NEGATIVE
BUN SERPL-MCNC: 12 MG/DL (ref 5–25)
CALCIUM SERPL-MCNC: 9.3 MG/DL (ref 8.4–10.2)
CHLORIDE SERPL-SCNC: 103 MMOL/L (ref 96–108)
CLARITY UR: ABNORMAL
CO2 SERPL-SCNC: 21 MMOL/L (ref 21–32)
COLOR UR: YELLOW
CREAT SERPL-MCNC: 0.54 MG/DL (ref 0.6–1.3)
EOSINOPHIL # BLD AUTO: 0.02 THOUSAND/ÂΜL (ref 0–0.61)
EOSINOPHIL NFR BLD AUTO: 0 % (ref 0–6)
ERYTHROCYTE [DISTWIDTH] IN BLOOD BY AUTOMATED COUNT: 12.5 % (ref 11.6–15.1)
EST. AVERAGE GLUCOSE BLD GHB EST-MCNC: 283 MG/DL
GFR SERPL CREATININE-BSD FRML MDRD: 116 ML/MIN/1.73SQ M
GLUCOSE SERPL-MCNC: 261 MG/DL (ref 65–140)
GLUCOSE SERPL-MCNC: 274 MG/DL (ref 65–140)
GLUCOSE SERPL-MCNC: 287 MG/DL (ref 65–140)
GLUCOSE UR STRIP-MCNC: ABNORMAL MG/DL
HBA1C MFR BLD: 11.5 %
HCT VFR BLD AUTO: 40.5 % (ref 34.8–46.1)
HGB BLD-MCNC: 13.9 G/DL (ref 11.5–15.4)
HGB UR QL STRIP.AUTO: ABNORMAL
IMM GRANULOCYTES # BLD AUTO: 0.07 THOUSAND/UL (ref 0–0.2)
IMM GRANULOCYTES NFR BLD AUTO: 1 % (ref 0–2)
KETONES UR STRIP-MCNC: ABNORMAL MG/DL
LEUKOCYTE ESTERASE UR QL STRIP: ABNORMAL
LYMPHOCYTES # BLD AUTO: 1.43 THOUSANDS/ÂΜL (ref 0.6–4.47)
LYMPHOCYTES NFR BLD AUTO: 17 % (ref 14–44)
MCH RBC QN AUTO: 29.3 PG (ref 26.8–34.3)
MCHC RBC AUTO-ENTMCNC: 34.3 G/DL (ref 31.4–37.4)
MCV RBC AUTO: 85 FL (ref 82–98)
MONOCYTES # BLD AUTO: 0.69 THOUSAND/ÂΜL (ref 0.17–1.22)
MONOCYTES NFR BLD AUTO: 8 % (ref 4–12)
NEUTROPHILS # BLD AUTO: 6.2 THOUSANDS/ÂΜL (ref 1.85–7.62)
NEUTS SEG NFR BLD AUTO: 74 % (ref 43–75)
NITRITE UR QL STRIP: POSITIVE
NON-SQ EPI CELLS URNS QL MICRO: ABNORMAL /HPF
NRBC BLD AUTO-RTO: 0 /100 WBCS
OTHER STN SPEC: ABNORMAL
PH UR STRIP.AUTO: 6 [PH]
PLATELET # BLD AUTO: 178 THOUSANDS/UL (ref 149–390)
PMV BLD AUTO: 10.7 FL (ref 8.9–12.7)
POTASSIUM SERPL-SCNC: 3.6 MMOL/L (ref 3.5–5.3)
PROT UR STRIP-MCNC: NEGATIVE MG/DL
RBC # BLD AUTO: 4.74 MILLION/UL (ref 3.81–5.12)
RBC #/AREA URNS AUTO: ABNORMAL /HPF
SODIUM SERPL-SCNC: 134 MMOL/L (ref 135–147)
SP GR UR STRIP.AUTO: 1.02 (ref 1–1.03)
T4 FREE SERPL-MCNC: 0.83 NG/DL (ref 0.61–1.12)
TSH SERPL DL<=0.05 MIU/L-ACNC: 22.63 UIU/ML (ref 0.45–4.5)
UROBILINOGEN UR QL STRIP.AUTO: 0.2 E.U./DL
WBC # BLD AUTO: 8.43 THOUSAND/UL (ref 4.31–10.16)
WBC #/AREA URNS AUTO: ABNORMAL /HPF

## 2024-01-26 PROCEDURE — 99284 EMERGENCY DEPT VISIT MOD MDM: CPT | Performed by: EMERGENCY MEDICINE

## 2024-01-26 PROCEDURE — 36415 COLL VENOUS BLD VENIPUNCTURE: CPT | Performed by: EMERGENCY MEDICINE

## 2024-01-26 PROCEDURE — 83036 HEMOGLOBIN GLYCOSYLATED A1C: CPT | Performed by: EMERGENCY MEDICINE

## 2024-01-26 PROCEDURE — 87591 N.GONORRHOEAE DNA AMP PROB: CPT | Performed by: EMERGENCY MEDICINE

## 2024-01-26 PROCEDURE — 84439 ASSAY OF FREE THYROXINE: CPT | Performed by: EMERGENCY MEDICINE

## 2024-01-26 PROCEDURE — 82948 REAGENT STRIP/BLOOD GLUCOSE: CPT

## 2024-01-26 PROCEDURE — 81001 URINALYSIS AUTO W/SCOPE: CPT | Performed by: EMERGENCY MEDICINE

## 2024-01-26 PROCEDURE — 85025 COMPLETE CBC W/AUTO DIFF WBC: CPT | Performed by: EMERGENCY MEDICINE

## 2024-01-26 PROCEDURE — 80048 BASIC METABOLIC PNL TOTAL CA: CPT | Performed by: EMERGENCY MEDICINE

## 2024-01-26 PROCEDURE — 84443 ASSAY THYROID STIM HORMONE: CPT | Performed by: EMERGENCY MEDICINE

## 2024-01-26 PROCEDURE — 99283 EMERGENCY DEPT VISIT LOW MDM: CPT

## 2024-01-26 PROCEDURE — 96372 THER/PROPH/DIAG INJ SC/IM: CPT

## 2024-01-26 PROCEDURE — 96360 HYDRATION IV INFUSION INIT: CPT

## 2024-01-26 PROCEDURE — 87491 CHLMYD TRACH DNA AMP PROBE: CPT | Performed by: EMERGENCY MEDICINE

## 2024-01-26 RX ORDER — PHENAZOPYRIDINE HYDROCHLORIDE 200 MG/1
200 TABLET, FILM COATED ORAL 3 TIMES DAILY PRN
Qty: 6 TABLET | Refills: 0 | Status: SHIPPED | OUTPATIENT
Start: 2024-01-26 | End: 2024-01-28

## 2024-01-26 RX ORDER — LEVOTHYROXINE SODIUM 0.05 MG/1
50 TABLET ORAL DAILY
Qty: 30 TABLET | Refills: 0 | Status: SHIPPED | OUTPATIENT
Start: 2024-01-26

## 2024-01-26 RX ORDER — CEPHALEXIN 250 MG/1
500 CAPSULE ORAL ONCE
Status: COMPLETED | OUTPATIENT
Start: 2024-01-26 | End: 2024-01-26

## 2024-01-26 RX ORDER — IBUPROFEN 600 MG/1
600 TABLET ORAL ONCE
Status: COMPLETED | OUTPATIENT
Start: 2024-01-26 | End: 2024-01-26

## 2024-01-26 RX ORDER — LEVOTHYROXINE SODIUM 0.03 MG/1
50 TABLET ORAL
Status: DISCONTINUED | OUTPATIENT
Start: 2024-01-27 | End: 2024-01-26 | Stop reason: HOSPADM

## 2024-01-26 RX ORDER — PHENAZOPYRIDINE HYDROCHLORIDE 100 MG/1
200 TABLET, FILM COATED ORAL ONCE
Status: COMPLETED | OUTPATIENT
Start: 2024-01-26 | End: 2024-01-26

## 2024-01-26 RX ORDER — CEPHALEXIN 500 MG/1
500 CAPSULE ORAL EVERY 12 HOURS SCHEDULED
Qty: 14 CAPSULE | Refills: 0 | Status: SHIPPED | OUTPATIENT
Start: 2024-01-26 | End: 2024-02-02

## 2024-01-26 RX ADMIN — SODIUM CHLORIDE 1000 ML: 0.9 INJECTION, SOLUTION INTRAVENOUS at 13:15

## 2024-01-26 RX ADMIN — PHENAZOPYRIDINE 200 MG: 100 TABLET ORAL at 13:23

## 2024-01-26 RX ADMIN — CEPHALEXIN 500 MG: 250 CAPSULE ORAL at 14:41

## 2024-01-26 RX ADMIN — IBUPROFEN 600 MG: 600 TABLET, FILM COATED ORAL at 13:23

## 2024-01-26 RX ADMIN — INSULIN HUMAN 5 UNITS: 100 INJECTION, SOLUTION PARENTERAL at 13:24

## 2024-01-26 NOTE — ED PROVIDER NOTES
History  Chief Complaint   Patient presents with    Possible UTI     PT c/o urinary pain for the past 2 days. Pt stated that she had an upper burning sensation when she peed. Pt c/o burning when voiding and after. Pt denies discharge     43-year-old female comes in for evaluation of UTI symptoms.  Patient states she has urinary pain for approximately 2 days.  Describes it as a burning sensation.  Also complains of suprapubic abdominal pain.  Also feels generally unwell.  Patient is a known diabetic.  She is supposed to be on metformin but states she stopped it approximately 2 months ago because it was not controlling her sugars.  Patient states she does not have a doctor here because she does not have insurance.      History provided by:  Patient   used: No    Difficulty Urinating      Prior to Admission Medications   Prescriptions Last Dose Informant Patient Reported? Taking?   medroxyPROGESTERone (DEPO-PROVERA) 150 mg/mL injection 12/15/2023  No No   Sig: Inject 1 mL (150 mg total) into a muscle every 3 (three) months   metFORMIN (GLUCOPHAGE) 500 mg tablet   No No   Sig: Take 1 tablet (500 mg total) by mouth 2 (two) times a day with meals   metFORMIN (GLUCOPHAGE) 500 mg tablet   No No   Sig: Take 1 tablet (500 mg total) by mouth 2 (two) times a day with meals      Facility-Administered Medications Last Administration Doses Remaining   medroxyPROGESTERone (DEPO-PROVERA) IM injection 150 mg 2023 11:41 AM           Past Medical History:   Diagnosis Date    Diabetes mellitus (HCC)     Disease of thyroid gland        Past Surgical History:   Procedure Laterality Date     SECTION         Family History   Problem Relation Age of Onset    Diabetes Mother     No Known Problems Father     Diabetes Sister     No Known Problems Brother     No Known Problems Brother     No Known Problems Son     No Known Problems Son     Diabetes Maternal Grandmother     Breast cancer Neg Hx     Colon cancer  "Neg Hx     Ovarian cancer Neg Hx      I have reviewed and agree with the history as documented.    E-Cigarette/Vaping    E-Cigarette Use Never User      E-Cigarette/Vaping Substances    Nicotine No     THC No     CBD No     Flavoring No     Other No     Unknown No      Social History     Tobacco Use    Smoking status: Never    Smokeless tobacco: Never   Vaping Use    Vaping status: Never Used   Substance Use Topics    Alcohol use: Yes     Comment: socially    Drug use: Never       Review of Systems   Genitourinary:  Positive for dysuria.       Physical Exam  Physical Exam    Vital Signs  ED Triage Vitals [01/26/24 1236]   Temperature Pulse Respirations Blood Pressure SpO2   98.2 °F (36.8 °C) (!) 108 18 128/83 100 %      Temp Source Heart Rate Source Patient Position - Orthostatic VS BP Location FiO2 (%)   Oral Monitor Sitting Left arm --      Pain Score       8           Vitals:    01/26/24 1236   BP: 128/83   Pulse: (!) 108   Patient Position - Orthostatic VS: Sitting         Visual Acuity      ED Medications  Medications   ibuprofen (MOTRIN) tablet 600 mg (600 mg Oral Given 1/26/24 1323)   phenazopyridine (PYRIDIUM) tablet 200 mg (200 mg Oral Given 1/26/24 1323)   sodium chloride 0.9 % bolus 1,000 mL (0 mL Intravenous Stopped 1/26/24 1440)   insulin regular (HumuLIN R,NovoLIN R) injection 5 Units (5 Units Subcutaneous Given 1/26/24 1324)   cephalexin (KEFLEX) capsule 500 mg (500 mg Oral Given 1/26/24 1441)       Diagnostic Studies  Results Reviewed       Procedure Component Value Units Date/Time    T4, free [[id number]]  (Normal) Collected: 01/26/24 1313    Lab Status: Final result Specimen: Blood from Arm, Right Updated: 01/26/24 1751     Free T4 0.83 ng/dL     Narrative:        \"Therapeutic range for patients medicated with thyroid disorders: 0.61-1.24 ng/dL.\"    Hemoglobin A1C [[id number]]  (Abnormal) Collected: 01/26/24 1313    Lab Status: Final result Specimen: Blood from Arm, Right Updated: 01/26/24 1736     " Hemoglobin A1C 11.5 %       mg/dl     Fingerstick Glucose (POCT) [[id number]]  (Abnormal) Collected: 01/26/24 1434    Lab Status: Final result Updated: 01/26/24 1434     POC Glucose 261 mg/dl     TSH, 3rd generation with Free T4 reflex [[id number]]  (Abnormal) Collected: 01/26/24 1313    Lab Status: Final result Specimen: Blood from Arm, Right Updated: 01/26/24 1417     TSH 3RD GENERATON 22.633 uIU/mL     Urine Microscopic [[id number]]  (Abnormal) Collected: 01/26/24 1303    Lab Status: Final result Specimen: Urine, Clean Catch Updated: 01/26/24 1341     RBC, UA 4-10 /hpf      WBC, UA Innumerable /hpf      Epithelial Cells Occasional /hpf      Bacteria, UA Moderate /hpf      OTHER OBSERVATIONS Transitional Epithelial Cells    Basic metabolic panel [[id number]]  (Abnormal) Collected: 01/26/24 1313    Lab Status: Final result Specimen: Blood from Arm, Right Updated: 01/26/24 1338     Sodium 134 mmol/L      Potassium 3.6 mmol/L      Chloride 103 mmol/L      CO2 21 mmol/L      ANION GAP 10 mmol/L      BUN 12 mg/dL      Creatinine 0.54 mg/dL      Glucose 287 mg/dL      Calcium 9.3 mg/dL      eGFR 116 ml/min/1.73sq m     Narrative:      National Kidney Disease Foundation guidelines for Chronic Kidney Disease (CKD):     Stage 1 with normal or high GFR (GFR > 90 mL/min/1.73 square meters)    Stage 2 Mild CKD (GFR = 60-89 mL/min/1.73 square meters)    Stage 3A Moderate CKD (GFR = 45-59 mL/min/1.73 square meters)    Stage 3B Moderate CKD (GFR = 30-44 mL/min/1.73 square meters)    Stage 4 Severe CKD (GFR = 15-29 mL/min/1.73 square meters)    Stage 5 End Stage CKD (GFR <15 mL/min/1.73 square meters)  Note: GFR calculation is accurate only with a steady state creatinine    CBC and differential [[id number]] Collected: 01/26/24 1313    Lab Status: Final result Specimen: Blood from Arm, Right Updated: 01/26/24 1320     WBC 8.43 Thousand/uL      RBC 4.74 Million/uL      Hemoglobin 13.9 g/dL      Hematocrit 40.5 %      MCV 85 fL       MCH 29.3 pg      MCHC 34.3 g/dL      RDW 12.5 %      MPV 10.7 fL      Platelets 178 Thousands/uL      nRBC 0 /100 WBCs      Neutrophils Relative 74 %      Immat GRANS % 1 %      Lymphocytes Relative 17 %      Monocytes Relative 8 %      Eosinophils Relative 0 %      Basophils Relative 0 %      Neutrophils Absolute 6.20 Thousands/µL      Immature Grans Absolute 0.07 Thousand/uL      Lymphocytes Absolute 1.43 Thousands/µL      Monocytes Absolute 0.69 Thousand/µL      Eosinophils Absolute 0.02 Thousand/µL      Basophils Absolute 0.02 Thousands/µL     Trichomonas vaginalis/Mycoplasma genitalium PCR [[id number]] Collected: 01/26/24 1313    Lab Status: In process Specimen: Urine, Voided Updated: 01/26/24 1318    UA (URINE) with reflex to Scope [[id number]]  (Abnormal) Collected: 01/26/24 1303    Lab Status: Final result Specimen: Urine, Clean Catch Updated: 01/26/24 1315     Color, UA Yellow     Clarity, UA Cloudy     Specific Gravity, UA 1.020     pH, UA 6.0     Leukocytes, UA Trace     Nitrite, UA Positive     Protein, UA Negative mg/dl      Glucose, UA 3+ mg/dl      Ketones, UA 40 (2+) mg/dl      Urobilinogen, UA 0.2 E.U./dl      Bilirubin, UA Negative     Occult Blood, UA 2+    Chlamydia/GC amplified DNA by PCR [[id number]] Collected: 01/26/24 1303    Lab Status: In process Specimen: Urine, Other Updated: 01/26/24 1308    Fingerstick Glucose (POCT) [[id number]]  (Abnormal) Collected: 01/26/24 1300    Lab Status: Final result Updated: 01/26/24 1301     POC Glucose 274 mg/dl                    No orders to display              Procedures  Procedures         ED Course  ED Course as of 01/26/24 1943 Fri Jan 26, 2024   1313 Discussed with patient going to our family practice or internal medicine clinic.  They take patients without insurance and have aida care and payment plans.   1434 Extensive discussion with patient that hypothyroidism and diabetes need to be managed long-term.  Patient is concerned about going to a  clinic because she does not have insurance and because she is undocumented.  I discussed with her that she should make an appointment at our family practice clinic with the [facility] as they have [place]-speaking staff and also many options for payment including aida care                                             Medical Decision Making  Differential diagnosis includes but is not limited to UTI, pyelonephritis, hyperglycemia, DKA, hypothyroid, electrolyte abnormality,    Problems Addressed:  Diabetes (HCC): chronic illness or injury with exacerbation, progression, or side effects of treatment     Details: Has not been taking her metformin  Hypothyroid: chronic illness or injury with exacerbation, progression, or side effects of treatment     Details: Has not been taking her Synthroid  UTI (urinary tract infection): acute illness or injury    Amount and/or Complexity of Data Reviewed  External Data Reviewed: notes.     Details: Reviewed medications and past medical history  Labs: ordered.    Risk  OTC drugs.  Prescription drug management.             Disposition  Final diagnoses:   Diabetes (HCC)   UTI (urinary tract infection)   Hypothyroid     Time reflects when diagnosis was documented in both MDM as applicable and the Disposition within this note       Time User Action Codes Description Comment    1/26/2024  1:14 PM Anjelica Carcamo K Add [E11.9] Diabetes (HCC)     1/26/2024  2:03 PM Eric Carcamoie K Add [N39.0] UTI (urinary tract infection)     1/26/2024  2:26 PM Anjelica Carcamo K Modify [E11.9] Diabetes (HCC)     1/26/2024  2:26 PM Turyue Anjelica K Modify [N39.0] UTI (urinary tract infection)     1/26/2024  2:26 PM Eric Carcamoie K Add [E03.9] Hypothyroid           ED Disposition       ED Disposition   Discharge    Condition   Stable    Date/Time   Fri Jan 26, 2024  2:33 PM    Comment   Gabby Olivera discharge to home/self care.                   Follow-up Information       Follow up  With Specialties Details Why Contact Info Additional Information    [facility] Schedule an appointment as soon as possible for a visit   [address] [place] [id number] [phone] [facility], [address] [place] [address], [id number] [phone]            Discharge Medication List as of 1/26/2024  2:33 PM        START taking these medications    Details   cephalexin (KEFLEX) 500 mg capsule Take 1 capsule (500 mg total) by mouth every 12 (twelve) hours for 7 days, Starting Fri 1/26/2024, Until Fri 2/2/2024, Normal      levothyroxine (Synthroid) 50 mcg tablet Take 1 tablet (50 mcg total) by mouth daily, Starting Fri 1/26/2024, Normal      phenazopyridine (Pyridium) 200 mg tablet Take 1 tablet (200 mg total) by mouth 3 (three) times a day as needed for bladder spasms for up to 2 days Will turn urine orange., Starting Fri 1/26/2024, Until Sun 1/28/2024 at 2359, Print           CONTINUE these medications which have NOT CHANGED    Details   medroxyPROGESTERone (DEPO-PROVERA) 150 mg/mL injection Inject 1 mL (150 mg total) into a muscle every 3 (three) months, Starting Mon 1/16/2023, Normal      !! metFORMIN (GLUCOPHAGE) 500 mg tablet Take 1 tablet (500 mg total) by mouth 2 (two) times a day with meals, Starting Thu 1/4/2024, Normal      !! metFORMIN (GLUCOPHAGE) 500 mg tablet Take 1 tablet (500 mg total) by mouth 2 (two) times a day with meals, Starting Thu 1/4/2024, Until Sat 2/3/2024, Normal       !! - Potential duplicate medications found. Please discuss with provider.          No discharge procedures on file.    PDMP Review       None            ED Provider  Electronically Signed by             Anjelica Carcamo DO  01/26/24 1943

## 2024-01-28 LAB
C TRACH DNA SPEC QL NAA+PROBE: NEGATIVE
N GONORRHOEA DNA SPEC QL NAA+PROBE: NEGATIVE

## 2024-01-30 LAB
M GENITALIUM DNA SPEC QL NAA+PROBE: ABNORMAL
T VAGINALIS DNA SPEC QL NAA+PROBE: ABNORMAL

## 2024-02-21 ENCOUNTER — APPOINTMENT (EMERGENCY)
Dept: ULTRASOUND IMAGING | Facility: HOSPITAL | Age: 44
End: 2024-02-21

## 2024-02-21 ENCOUNTER — HOSPITAL ENCOUNTER (EMERGENCY)
Facility: HOSPITAL | Age: 44
Discharge: HOME | End: 2024-02-21
Attending: INTERNAL MEDICINE

## 2024-02-21 VITALS
DIASTOLIC BLOOD PRESSURE: 89 MMHG | RESPIRATION RATE: 18 BRPM | OXYGEN SATURATION: 100 % | SYSTOLIC BLOOD PRESSURE: 141 MMHG | HEART RATE: 113 BPM

## 2024-02-21 DIAGNOSIS — N93.9 VAGINAL BLEEDING: Primary | ICD-10-CM

## 2024-02-21 DIAGNOSIS — E11.65 HYPERGLYCEMIA DUE TO DIABETES MELLITUS (HCC): ICD-10-CM

## 2024-02-21 LAB
ABO GROUP BLD: NORMAL
ALBUMIN SERPL BCP-MCNC: 4.1 G/DL (ref 3.5–5)
ALP SERPL-CCNC: 68 U/L (ref 34–104)
ALT SERPL W P-5'-P-CCNC: 26 U/L (ref 7–52)
ANION GAP SERPL CALCULATED.3IONS-SCNC: 11 MMOL/L
AST SERPL W P-5'-P-CCNC: 17 U/L (ref 13–39)
B-HCG SERPL-ACNC: <1 MIU/ML (ref 0–5)
BACTERIA UR QL AUTO: ABNORMAL /HPF
BASOPHILS # BLD AUTO: 0.03 THOUSANDS/ÂΜL (ref 0–0.1)
BASOPHILS NFR BLD AUTO: 0 % (ref 0–1)
BETA-HYDROXYBUTYRATE: 0.2 MMOL/L
BILIRUB SERPL-MCNC: 0.42 MG/DL (ref 0.2–1)
BILIRUB UR QL STRIP: NEGATIVE
BLD GP AB SCN SERPL QL: NEGATIVE
BUN SERPL-MCNC: 12 MG/DL (ref 5–25)
CALCIUM SERPL-MCNC: 9.3 MG/DL (ref 8.4–10.2)
CHLORIDE SERPL-SCNC: 99 MMOL/L (ref 96–108)
CLARITY UR: CLEAR
CO2 SERPL-SCNC: 21 MMOL/L (ref 21–32)
COLOR UR: YELLOW
CREAT SERPL-MCNC: 0.67 MG/DL (ref 0.6–1.3)
EOSINOPHIL # BLD AUTO: 0.01 THOUSAND/ÂΜL (ref 0–0.61)
EOSINOPHIL NFR BLD AUTO: 0 % (ref 0–6)
ERYTHROCYTE [DISTWIDTH] IN BLOOD BY AUTOMATED COUNT: 13.2 % (ref 11.6–15.1)
EXT PREGNANCY TEST URINE: NEGATIVE
EXT. CONTROL: NORMAL
GFR SERPL CREATININE-BSD FRML MDRD: 108 ML/MIN/1.73SQ M
GLUCOSE SERPL-MCNC: 282 MG/DL (ref 65–140)
GLUCOSE SERPL-MCNC: 457 MG/DL (ref 65–140)
GLUCOSE UR STRIP-MCNC: ABNORMAL MG/DL
HCT VFR BLD AUTO: 35 % (ref 34.8–46.1)
HGB BLD-MCNC: 11.9 G/DL (ref 11.5–15.4)
HGB UR QL STRIP.AUTO: ABNORMAL
IMM GRANULOCYTES # BLD AUTO: 0.07 THOUSAND/UL (ref 0–0.2)
IMM GRANULOCYTES NFR BLD AUTO: 1 % (ref 0–2)
KETONES UR STRIP-MCNC: ABNORMAL MG/DL
LEUKOCYTE ESTERASE UR QL STRIP: NEGATIVE
LYMPHOCYTES # BLD AUTO: 1.81 THOUSANDS/ÂΜL (ref 0.6–4.47)
LYMPHOCYTES NFR BLD AUTO: 27 % (ref 14–44)
MCH RBC QN AUTO: 30.1 PG (ref 26.8–34.3)
MCHC RBC AUTO-ENTMCNC: 34 G/DL (ref 31.4–37.4)
MCV RBC AUTO: 88 FL (ref 82–98)
MONOCYTES # BLD AUTO: 0.55 THOUSAND/ÂΜL (ref 0.17–1.22)
MONOCYTES NFR BLD AUTO: 8 % (ref 4–12)
NEUTROPHILS # BLD AUTO: 4.31 THOUSANDS/ÂΜL (ref 1.85–7.62)
NEUTS SEG NFR BLD AUTO: 64 % (ref 43–75)
NITRITE UR QL STRIP: NEGATIVE
NON-SQ EPI CELLS URNS QL MICRO: ABNORMAL /HPF
NRBC BLD AUTO-RTO: 0 /100 WBCS
PH BLDV: 7.36 [PH] (ref 7.3–7.4)
PH UR STRIP.AUTO: 6 [PH]
PLATELET # BLD AUTO: 200 THOUSANDS/UL (ref 149–390)
PMV BLD AUTO: 10.3 FL (ref 8.9–12.7)
POTASSIUM SERPL-SCNC: 4 MMOL/L (ref 3.5–5.3)
PROT SERPL-MCNC: 7.3 G/DL (ref 6.4–8.4)
PROT UR STRIP-MCNC: NEGATIVE MG/DL
RBC # BLD AUTO: 3.96 MILLION/UL (ref 3.81–5.12)
RBC #/AREA URNS AUTO: ABNORMAL /HPF
RH BLD: POSITIVE
SODIUM SERPL-SCNC: 131 MMOL/L (ref 135–147)
SP GR UR STRIP.AUTO: 1.01 (ref 1–1.03)
SPECIMEN EXPIRATION DATE: NORMAL
UROBILINOGEN UR QL STRIP.AUTO: 0.2 E.U./DL
WBC # BLD AUTO: 6.78 THOUSAND/UL (ref 4.31–10.16)
WBC #/AREA URNS AUTO: ABNORMAL /HPF

## 2024-02-21 PROCEDURE — 81003 URINALYSIS AUTO W/O SCOPE: CPT | Performed by: INTERNAL MEDICINE

## 2024-02-21 PROCEDURE — 86900 BLOOD TYPING SEROLOGIC ABO: CPT | Performed by: INTERNAL MEDICINE

## 2024-02-21 PROCEDURE — 36415 COLL VENOUS BLD VENIPUNCTURE: CPT | Performed by: INTERNAL MEDICINE

## 2024-02-21 PROCEDURE — 99285 EMERGENCY DEPT VISIT HI MDM: CPT | Performed by: INTERNAL MEDICINE

## 2024-02-21 PROCEDURE — 76856 US EXAM PELVIC COMPLETE: CPT

## 2024-02-21 PROCEDURE — 99284 EMERGENCY DEPT VISIT MOD MDM: CPT

## 2024-02-21 PROCEDURE — 82010 KETONE BODYS QUAN: CPT | Performed by: INTERNAL MEDICINE

## 2024-02-21 PROCEDURE — 93005 ELECTROCARDIOGRAM TRACING: CPT

## 2024-02-21 PROCEDURE — 81001 URINALYSIS AUTO W/SCOPE: CPT | Performed by: INTERNAL MEDICINE

## 2024-02-21 PROCEDURE — 96360 HYDRATION IV INFUSION INIT: CPT

## 2024-02-21 PROCEDURE — 96361 HYDRATE IV INFUSION ADD-ON: CPT

## 2024-02-21 PROCEDURE — 81025 URINE PREGNANCY TEST: CPT | Performed by: INTERNAL MEDICINE

## 2024-02-21 PROCEDURE — 86901 BLOOD TYPING SEROLOGIC RH(D): CPT | Performed by: INTERNAL MEDICINE

## 2024-02-21 PROCEDURE — 86850 RBC ANTIBODY SCREEN: CPT | Performed by: INTERNAL MEDICINE

## 2024-02-21 PROCEDURE — 85025 COMPLETE CBC W/AUTO DIFF WBC: CPT | Performed by: INTERNAL MEDICINE

## 2024-02-21 PROCEDURE — 80053 COMPREHEN METABOLIC PANEL: CPT | Performed by: INTERNAL MEDICINE

## 2024-02-21 PROCEDURE — 76830 TRANSVAGINAL US NON-OB: CPT

## 2024-02-21 PROCEDURE — 84702 CHORIONIC GONADOTROPIN TEST: CPT | Performed by: INTERNAL MEDICINE

## 2024-02-21 PROCEDURE — 96372 THER/PROPH/DIAG INJ SC/IM: CPT

## 2024-02-21 PROCEDURE — 82800 BLOOD PH: CPT | Performed by: INTERNAL MEDICINE

## 2024-02-21 PROCEDURE — 82948 REAGENT STRIP/BLOOD GLUCOSE: CPT

## 2024-02-21 RX ORDER — MEDROXYPROGESTERONE ACETATE 150 MG/ML
150 INJECTION, SUSPENSION INTRAMUSCULAR ONCE
Status: COMPLETED | OUTPATIENT
Start: 2024-02-21 | End: 2024-02-21

## 2024-02-21 RX ADMIN — SODIUM CHLORIDE 1000 ML: 0.9 INJECTION, SOLUTION INTRAVENOUS at 15:04

## 2024-02-21 RX ADMIN — SODIUM CHLORIDE 1000 ML: 0.9 INJECTION, SOLUTION INTRAVENOUS at 17:15

## 2024-02-21 RX ADMIN — MEDROXYPROGESTERONE ACETATE 150 MG: 150 INJECTION, SUSPENSION, EXTENDED RELEASE INTRAMUSCULAR at 16:24

## 2024-02-21 RX ADMIN — INSULIN HUMAN 10 UNITS: 100 INJECTION, SOLUTION PARENTERAL at 15:39

## 2024-02-21 NOTE — DISCHARGE INSTRUCTIONS
Take your medication as prescribed.  Follow-up with OB/GYN doctor Pam.  Labs Reviewed   COMPREHENSIVE METABOLIC PANEL - Abnormal       Result Value Ref Range Status    Sodium 131 (*) 135 - 147 mmol/L Final    Potassium 4.0  3.5 - 5.3 mmol/L Final    Chloride 99  96 - 108 mmol/L Final    CO2 21  21 - 32 mmol/L Final    ANION GAP 11  mmol/L Final    BUN 12  5 - 25 mg/dL Final    Creatinine 0.67  0.60 - 1.30 mg/dL Final    Comment: Standardized to IDMS reference method    Glucose 457 (*) 65 - 140 mg/dL Final    Comment: If the patient is fasting, the ADA then defines impaired fasting glucose as > 100 mg/dL and diabetes as > or equal to 123 mg/dL.    Calcium 9.3  8.4 - 10.2 mg/dL Final    AST 17  13 - 39 U/L Final    ALT 26  7 - 52 U/L Final    Comment: Specimen collection should occur prior to Sulfasalazine administration due to the potential for falsely depressed results.     Alkaline Phosphatase 68  34 - 104 U/L Final    Total Protein 7.3  6.4 - 8.4 g/dL Final    Albumin 4.1  3.5 - 5.0 g/dL Final    Total Bilirubin 0.42  0.20 - 1.00 mg/dL Final    Comment: Use of this assay is not recommended for patients undergoing treatment with eltrombopag due to the potential for falsely elevated results.  N-acetyl-p-benzoquinone imine (metabolite of Acetaminophen) will generate erroneously low results in samples for patients that have taken an overdose of Acetaminophen.    eGFR 108  ml/min/1.73sq m Final    Narrative:     National Kidney Disease Foundation guidelines for Chronic Kidney Disease (CKD):     Stage 1 with normal or high GFR (GFR > 90 mL/min/1.73 square meters)    Stage 2 Mild CKD (GFR = 60-89 mL/min/1.73 square meters)    Stage 3A Moderate CKD (GFR = 45-59 mL/min/1.73 square meters)    Stage 3B Moderate CKD (GFR = 30-44 mL/min/1.73 square meters)    Stage 4 Severe CKD (GFR = 15-29 mL/min/1.73 square meters)    Stage 5 End Stage CKD (GFR <15 mL/min/1.73 square meters)  Note: GFR calculation is accurate only with a  steady state creatinine   UA W REFLEX TO MICROSCOPIC WITH REFLEX TO CULTURE - Abnormal    Color, UA Yellow  Yellow Final    Clarity, UA Clear  Clear Final    Specific Gravity, UA 1.010  1.001 - 1.030 Final    pH, UA 6.0  5.0, 5.5, 6.0, 6.5, 7.0, 7.5, 8.0 Final    Leukocytes, UA Negative  Negative Final    Nitrite, UA Negative  Negative Final    Protein, UA Negative  Negative, Interference- unable to analyze mg/dl Final    Glucose, UA 3+ (*) Negative mg/dl Final    Ketones, UA 15 (1+) (*) Negative mg/dl Final    Urobilinogen, UA 0.2  0.2, 1.0 E.U./dl E.U./dl Final    Bilirubin, UA Negative  Negative Final    Occult Blood, UA 3+ (*) Negative Final   URINE MICROSCOPIC - Abnormal    RBC, UA 30-50 (*) None Seen, 0-1, 1-2, 2-4, 0-5 /hpf Final    WBC, UA None Seen  None Seen, 0-1, 1-2, 0-5, 2-4 /hpf Final    Epithelial Cells Occasional  None Seen, Occasional /hpf Final    Bacteria, UA None Seen  None Seen, Occasional /hpf Final   POCT GLUCOSE - Abnormal    POC Glucose 282 (*) 65 - 140 mg/dl Final    Comment: CRITICAL VALUE NOTED REPEAT   HCG, QUANTITATIVE - Normal    HCG, Quant <1  0 - 5 mIU/mL Final    Narrative:      Expected Ranges:    HCG results between 5 and 25 mIU/mL may be indicative of early pregnancy but should be interpreted in light of the total clinical presentation.    HCG can rise to detectable levels in sharmaine and post menopausal women (0-11.6 mIU/mL).     Approximate               Approximate HCG  Gestation age          Concentration ( mIU/mL)  _____________          ______________________   Weeks                      HCG values  0.2-1                       5-50  1-2                           2-3                         100-5000  3-4                         500-15110  4-5                         1000-98604  5-6                         [id number]  6-8                         [id number]  8-12                        [id number]     PH, VENOUS - Normal    pH, Santhosh 7.362  7.300 - 7.400 Final   BETA  HYDROXYBUTYRATE - Normal    BETA-HYDROXYBUTYRATE 0.2  <0.6 mmol/L Final   POCT PREGNANCY, URINE - Normal    EXT Preg Test, Ur Negative   Final    Control Valid   Final   CBC AND DIFFERENTIAL    WBC 6.78  4.31 - 10.16 Thousand/uL Final    RBC 3.96  3.81 - 5.12 Million/uL Final    Hemoglobin 11.9  11.5 - 15.4 g/dL Final    Hematocrit 35.0  34.8 - 46.1 % Final    MCV 88  82 - 98 fL Final    MCH 30.1  26.8 - 34.3 pg Final    MCHC 34.0  31.4 - 37.4 g/dL Final    RDW 13.2  11.6 - 15.1 % Final    MPV 10.3  8.9 - 12.7 fL Final    Platelets 200  149 - 390 Thousands/uL Final    nRBC 0  /100 WBCs Final    Neutrophils Relative 64  43 - 75 % Final    Immat GRANS % 1  0 - 2 % Final    Lymphocytes Relative 27  14 - 44 % Final    Monocytes Relative 8  4 - 12 % Final    Eosinophils Relative 0  0 - 6 % Final    Basophils Relative 0  0 - 1 % Final    Neutrophils Absolute 4.31  1.85 - 7.62 Thousands/µL Final    Immature Grans Absolute 0.07  0.00 - 0.20 Thousand/uL Final    Lymphocytes Absolute 1.81  0.60 - 4.47 Thousands/µL Final    Monocytes Absolute 0.55  0.17 - 1.22 Thousand/µL Final    Eosinophils Absolute 0.01  0.00 - 0.61 Thousand/µL Final    Basophils Absolute 0.03  0.00 - 0.10 Thousands/µL Final   TYPE AND SCREEN    ABO Grouping B   Final    Rh Factor Positive   Final    Antibody Screen Negative   Final    Specimen Expiration Date 20240224   Final   ABORH RECHECK     US pelvis complete w transvaginal   Final Result         1. Right uterine fundal subserosal 2.9 cm fibroid.   2. 2.1 cm probable posterior submucosal uterine body fibroid with intracavitary polypoid lesion not excluded.   3. 2.9 cm left ovarian cyst O RADS category 2 not necessitating additional follow-up.      The study was marked in EPIC for significant notification.                              Workstation performed: [id number]

## 2024-02-21 NOTE — ED PROVIDER NOTES
History  Chief Complaint   Patient presents with    Vaginal Bleeding     Pt reports vaginal bleeding since yesterday with large blood clots. Pt states she gets birth control injections and when the effect of the injection wears off before her next injection she begins bleeding. She had an appointment for the injection today but the appt was cancelled and rescheduled for next week. Reports hx of anemia.      This is 44 years old came for having vaginal bleeding.  Patient stated that this started since yesterday and continued today.  Patient passed large blood clots.  Patient has changing multiple pads today.  Patient has mild abdominal cramps.  Patient supposed to receive her Depo-Provera injection today but it was canceled because of the vaginal bleeding.  Patient has received this injection every 3 months for 3 years.  Patient does not remember her last.  But it is possible 2 years ago.  Patient denies being pregnant.  Patient has history of anemia and she used to be on iron tablets.  Patient has history of diabetes and she is on metformin.  Patient has palpitation yesterday.  Patient is tachycardic at the ER with heart rate 113/min.  Patient is A0.  Patient denies any dizziness nausea vomiting.  Patient has history of .        Prior to Admission Medications   Prescriptions Last Dose Informant Patient Reported? Taking?   levothyroxine (Synthroid) 50 mcg tablet   No No   Sig: Take 1 tablet (50 mcg total) by mouth daily   medroxyPROGESTERone (DEPO-PROVERA) 150 mg/mL injection   No No   Sig: Inject 1 mL (150 mg total) into a muscle every 3 (three) months   metFORMIN (GLUCOPHAGE) 500 mg tablet   No No   Sig: Take 1 tablet (500 mg total) by mouth 2 (two) times a day with meals   metFORMIN (GLUCOPHAGE) 500 mg tablet   No No   Sig: Take 1 tablet (500 mg total) by mouth 2 (two) times a day with meals      Facility-Administered Medications Last Administration Doses Remaining   medroxyPROGESTERone (DEPO-PROVERA)  IM injection 150 mg 2023 11:41 AM           Past Medical History:   Diagnosis Date    Diabetes mellitus (HCC)     Disease of thyroid gland        Past Surgical History:   Procedure Laterality Date     SECTION         Family History   Problem Relation Age of Onset    Diabetes Mother     No Known Problems Father     Diabetes Sister     No Known Problems Brother     No Known Problems Brother     No Known Problems Son     No Known Problems Son     Diabetes Maternal Grandmother     Breast cancer Neg Hx     Colon cancer Neg Hx     Ovarian cancer Neg Hx      I have reviewed and agree with the history as documented.    E-Cigarette/Vaping    E-Cigarette Use Never User      E-Cigarette/Vaping Substances    Nicotine No     THC No     CBD No     Flavoring No     Other No     Unknown No      Social History     Tobacco Use    Smoking status: Never    Smokeless tobacco: Never   Vaping Use    Vaping status: Never Used   Substance Use Topics    Alcohol use: Yes     Comment: socially    Drug use: Never       Review of Systems   Constitutional:  Negative for fatigue and fever.   HENT:  Negative for congestion, sinus pressure, sinus pain, sneezing and sore throat.    Respiratory:  Negative for cough and shortness of breath.    Cardiovascular:  Negative for chest pain and palpitations.   Gastrointestinal:  Negative for abdominal pain, diarrhea, nausea and vomiting.   Genitourinary:  Positive for vaginal bleeding. Negative for decreased urine volume, difficulty urinating, dysuria, flank pain, frequency, hematuria, pelvic pain and urgency.   Musculoskeletal:  Negative for back pain, myalgias, neck pain and neck stiffness.   Skin:  Negative for color change, pallor and rash.   Neurological:  Negative for dizziness, light-headedness and headaches.   Psychiatric/Behavioral:  Negative for agitation and behavioral problems.        Physical Exam  Physical Exam  Vitals and nursing note reviewed.   Constitutional:       General: She  is not in acute distress.     Appearance: She is well-developed. She is not ill-appearing, toxic-appearing or diaphoretic.   HENT:      Head: Normocephalic and atraumatic.      Right Ear: Ear canal normal.      Left Ear: Ear canal normal.      Nose: Nose normal. No congestion or rhinorrhea.      Mouth/Throat:      Pharynx: No oropharyngeal exudate or posterior oropharyngeal erythema.   Eyes:      Extraocular Movements: Extraocular movements intact.      Pupils: Pupils are equal, round, and reactive to light.   Neck:      Vascular: No carotid bruit.   Cardiovascular:      Rate and Rhythm: Normal rate and regular rhythm.      Heart sounds: Normal heart sounds. No murmur heard.     No friction rub. No gallop.   Pulmonary:      Effort: Pulmonary effort is normal. No respiratory distress.      Breath sounds: Normal breath sounds. No wheezing, rhonchi or rales.   Chest:      Chest wall: No tenderness.   Abdominal:      General: Bowel sounds are normal. There is no distension.      Palpations: Abdomen is soft. There is no mass.      Tenderness: There is no abdominal tenderness. There is no right CVA tenderness, left CVA tenderness, guarding or rebound.      Hernia: No hernia is present.   Musculoskeletal:         General: No swelling, tenderness, deformity or signs of injury. Normal range of motion.      Cervical back: Normal range of motion and neck supple. No rigidity or tenderness.      Right lower leg: No edema.      Left lower leg: No edema.   Lymphadenopathy:      Cervical: No cervical adenopathy.   Skin:     General: Skin is warm and dry.      Capillary Refill: Capillary refill takes less than 2 seconds.      Coloration: Skin is not jaundiced or pale.      Findings: No bruising, erythema, lesion or rash.   Neurological:      Mental Status: She is alert and oriented to person, place, and time.   Psychiatric:         Behavior: Behavior normal.         Vital Signs  ED Triage Vitals [02/21/24 1341]   Temp Pulse  Respirations Blood Pressure SpO2   -- (!) 113 18 141/89 100 %      Temp src Heart Rate Source Patient Position - Orthostatic VS BP Location FiO2 (%)   -- Monitor Sitting Right arm --      Pain Score       --           Vitals:    02/21/24 1341   BP: 141/89   Pulse: (!) 113   Patient Position - Orthostatic VS: Sitting         Visual Acuity      ED Medications  Medications   sodium chloride 0.9 % bolus 1,000 mL (0 mL Intravenous Stopped 2/21/24 1711)   sodium chloride 0.9 % bolus 1,000 mL (0 mL Intravenous Stopped 2/21/24 1731)   insulin regular (HumuLIN R,NovoLIN R) injection 10 Units (10 Units Subcutaneous Given 2/21/24 1539)   medroxyPROGESTERone (DEPO-PROVERA) IM injection 150 mg (150 mg Intramuscular Given 2/21/24 1624)       Diagnostic Studies  Results Reviewed       Procedure Component Value Units Date/Time    Fingerstick Glucose (POCT) [[id number]]  (Abnormal) Collected: 02/21/24 1704    Lab Status: Final result Updated: 02/21/24 1705     POC Glucose 282 mg/dl     hCG, quantitative [[id number]]  (Normal) Collected: 02/21/24 1504    Lab Status: Final result Specimen: Blood from Arm, Right Updated: 02/21/24 1534     HCG, Quant <1 mIU/mL     Narrative:       Expected Ranges:    HCG results between 5 and 25 mIU/mL may be indicative of early pregnancy but should be interpreted in light of the total clinical presentation.    HCG can rise to detectable levels in sharmaine and post menopausal women (0-11.6 mIU/mL).     Approximate               Approximate HCG  Gestation age          Concentration ( mIU/mL)  _____________          ______________________   Weeks                      HCG values  0.2-1                       5-50  1-2                           2-3                         100-5000  3-4                         500-63771  4-5                         1000-80056  5-6                         [id number]  6-8                         86235-558003  8-12                        48750-657595      pH, venous  [[id number]]  (Normal) Collected: 02/21/24 1517    Lab Status: Final result Specimen: Blood from Arm, Right Updated: 02/21/24 1533     pH, Santhosh 7.362    Comprehensive metabolic panel [[id number]]  (Abnormal) Collected: 02/21/24 1504    Lab Status: Final result Specimen: Blood from Arm, Right Updated: 02/21/24 1526     Sodium 131 mmol/L      Potassium 4.0 mmol/L      Chloride 99 mmol/L      CO2 21 mmol/L      ANION GAP 11 mmol/L      BUN 12 mg/dL      Creatinine 0.67 mg/dL      Glucose 457 mg/dL      Calcium 9.3 mg/dL      AST 17 U/L      ALT 26 U/L      Alkaline Phosphatase 68 U/L      Total Protein 7.3 g/dL      Albumin 4.1 g/dL      Total Bilirubin 0.42 mg/dL      eGFR 108 ml/min/1.73sq m     Narrative:      National Kid[employer] Foundation guidelines for Chronic Kidney Disease (CKD):     Stage 1 with normal or high GFR (GFR > 90 mL/min/1.73 square meters)    Stage 2 Mild CKD (GFR = 60-89 mL/min/1.73 square meters)    Stage 3A Moderate CKD (GFR = 45-59 mL/min/1.73 square meters)    Stage 3B Moderate CKD (GFR = 30-44 mL/min/1.73 square meters)    Stage 4 Severe CKD (GFR = 15-29 mL/min/1.73 square meters)    Stage 5 End Stage CKD (GFR <15 mL/min/1.73 square meters)  Note: GFR calculation is accurate only with a steady state creatinine    Beta Hydroxybutyrate [[id number]]  (Normal) Collected: 02/21/24 1504    Lab Status: Final result Specimen: Blood from Arm, Right Updated: 02/21/24 1518     BETA-HYDROXYBUTYRATE 0.2 mmol/L     CBC and differential [[id number]] Collected: 02/21/24 1504    Lab Status: Final result Specimen: Blood from Arm, Right Updated: 02/21/24 1511     WBC 6.78 Thousand/uL      RBC 3.96 Million/uL      Hemoglobin 11.9 g/dL      Hematocrit 35.0 %      MCV 88 fL      MCH 30.1 pg      MCHC 34.0 g/dL      RDW 13.2 %      MPV 10.3 fL      Platelets 200 Thousands/uL      nRBC 0 /100 WBCs      Neutrophils Relative 64 %      Immat GRANS % 1 %      Lymphocytes Relative 27 %      Monocytes Relative 8 %       Eosinophils Relative 0 %      Basophils Relative 0 %      Neutrophils Absolute 4.31 Thousands/µL      Immature Grans Absolute 0.07 Thousand/uL      Lymphocytes Absolute 1.81 Thousands/µL      Monocytes Absolute 0.55 Thousand/µL      Eosinophils Absolute 0.01 Thousand/µL      Basophils Absolute 0.03 Thousands/µL     Urine Microscopic [[id number]]  (Abnormal) Collected: 02/21/24 1401    Lab Status: Final result Specimen: Urine, Clean Catch Updated: 02/21/24 1457     RBC, UA 30-50 /hpf      WBC, UA None Seen /hpf      Epithelial Cells Occasional /hpf      Bacteria, UA None Seen /hpf     UA w Reflex to Microscopic w Reflex to Culture [[id number]]  (Abnormal) Collected: 02/21/24 1401    Lab Status: Final result Specimen: Urine, Clean Catch Updated: 02/21/24 1411     Color, UA Yellow     Clarity, UA Clear     Specific Gravity, UA 1.010     pH, UA 6.0     Leukocytes, UA Negative     Nitrite, UA Negative     Protein, UA Negative mg/dl      Glucose, UA 3+ mg/dl      Ketones, UA 15 (1+) mg/dl      Urobilinogen, UA 0.2 E.U./dl      Bilirubin, UA Negative     Occult Blood, UA 3+    POCT pregnancy, urine [[id number]]  (Normal) Resulted: 02/21/24 1404    Lab Status: Final result Updated: 02/21/24 1404     EXT Preg Test, Ur Negative     Control Valid                   US pelvis complete w transvaginal   Final Result by Arsenio Castle MD (02/21 1540)         1. Right uterine fundal subserosal 2.9 cm fibroid.   2. 2.1 cm probable posterior submucosal uterine body fibroid with intracavitary polypoid lesion not excluded.   3. 2.9 cm left ovarian cyst O RADS category 2 not necessitating additional follow-up.      The study was marked in EPIC for significant notification.                              Workstation performed: [id number]                    Procedures  ECG 12 Lead Documentation Only    Date/Time: 2/21/2024 3:17 PM    Performed by: Josefa Ramos MD  Authorized by: Josefa Ramos MD    Indications / Diagnosis:   PALPITATION  ECG reviewed by me, the ED Provider: yes    Patient location:  ED  Previous ECG:     Previous ECG:  Compared to current    Similarity:  No change  Interpretation:     Interpretation: normal    Rate:     ECG rate:  98    ECG rate assessment: normal    Rhythm:     Rhythm: sinus rhythm    QRS:     QRS axis:  Normal    QRS intervals:  Normal  Conduction:     Conduction: normal    ST segments:     ST segments:  Normal  T waves:     T waves: non-specific             ED Course                               SBIRT 22yo+      Flowsheet Row Most Recent Value   Initial Alcohol Screen: US AUDIT-C     1. How often do you have a drink containing alcohol? 0 Filed at: 2024 1345   Audit-C Score 0 Filed at: 2024 1340   SAMI: How many times in the past year have you...    Used an illegal drug or used a prescription medication for non-medical reasons? Never Filed at: 2024 1348                      Medical Decision Making  This is 44 years old came for having vaginal bleeding since yesterday.  Patient passed large blood clots.  Patient has multiple pads for change it.  Patient has mild abdominal cramps.  Patient supposed to receive her Depo-Provera injection today but it was canceled because of the vaginal bleeding. Patient received Depo-Provera every 3 months and she is receiving it for 3 years now.  Patient has history of anemia she used to be on iron tablets.  Patient has palpitation yesterday but no chest pain.  Patient is A0.  Patient is diabetic and she is on metformin.  Physical exam shows; no pertinent positive findings., patient  refused the pelvic exam . labs came back H/H 11.9/35 , Glucose 457, B hydroxybutyrate 0.2, PH 7.36. US Pelvis, TV; 1. Right uterine fundal subserosal 2.9 cm fibroid.  2. 2.1 cm probable posterior submucosal uterine body fibroid with intracavitary polypoid lesion not excluded.  3. 2.9 cm left ovarian cyst O RADS category 2 not necessitating additional  follow-up.  Patient asked to have the Depo-Provera injection at the ER. AS pt still bleeding now with blood clots.     Case discussed with dr Mckinney OB/GYN and stated ; You can go ahead and give it to her if we have it, but she need office follow up outpatient, such as endometrial, biopsy and discussion for long-term management for her bleeding. If she would like to follow up in my office will be happy to see her or she can follow up in the clinic if there's no Depo-Provera in the hospital, she can call the clinic and schedule appointment.  Pt BS go down to 282. Pt want to go home and does not want more IV fluids. Pt to be discharged and follow up with dr Mckinney OB/GYN.   All question concerns of patient having further addressed.    Amount and/or Complexity of Data Reviewed  Labs: ordered.     Details: LABS ;  H/H 11.9/35 , Glucose 457, B hydroxybutyrate 0.2, PH 7.36. B-HCG <1    Radiology: ordered.     Details: US Pelvis, TV; 1. Right uterine fundal subserosal 2.9 cm fibroid.  2. 2.1 cm probable posterior submucosal uterine body fibroid with intracavitary polypoid lesion not excluded.  3. 2.9 cm left ovarian cyst O RADS category 2 not necessitating additional follow-up.    ECG/medicine tests: ordered and independent interpretation performed.     Details: EKG; NSR 98/MIN,. NONE specific T wave abnormality .   Discussion of management or test interpretation with external provider(s): Case discussed with dr Mckinney OB/GYN ; You can go ahead and give it to her if we have it, but she need office follow up outpatient, such as endometrial, biopsy and discussion for long-term management for her bleeding. If she would like to follow up in my office will be happy to see her or she can follow up in the clinic if there's no Depo-Provera in the hospital, she can call the clinic and schedule appointment.    Risk  OTC drugs.  Prescription drug management.             Disposition  Final diagnoses:   Vaginal bleeding    Hyperglycemia due to diabetes mellitus (HCC)     Time reflects when diagnosis was documented in both MDM as applicable and the Disposition within this note       Time User Action Codes Description Comment    2/21/2024  4:26 PM Josefa Ramos [N93.9] Vaginal bleeding     2/21/2024  4:26 PM Josefa Ramos [E11.65] Hyperglycemia due to diabetes mellitus (HCC)           ED Disposition       ED Disposition   Discharge    Condition   Stable    Date/Time   Wed Feb 21, 2024 1729    Comment   Gabby Olivera discharge to home/self care.                   Follow-up Information       Follow up With Specialties Details Why Contact Info    Uzair Mckinney MD Obstetrics and Gynecology, Obstetrics, Gynecology In 1 week  [address] [facility] [address]  [facility] [id number] [phone]              Discharge Medication List as of 2/21/2024  5:33 PM        CONTINUE these medications which have NOT CHANGED    Details   levothyroxine (Synthroid) 50 mcg tablet Take 1 tablet (50 mcg total) by mouth daily, Starting Fri 1/26/2024, Normal      medroxyPROGESTERone (DEPO-PROVERA) 150 mg/mL injection Inject 1 mL (150 mg total) into a muscle every 3 (three) months, Starting Mon 1/16/2023, Normal      metFORMIN (GLUCOPHAGE) 500 mg tablet Take 1 tablet (500 mg total) by mouth 2 (two) times a day with meals, Starting Thu 1/4/2024, Normal             No discharge procedures on file.    PDMP Review       None            ED Provider  Electronically Signed by             Josefa Ramos MD  02/22/24 2023

## 2024-02-21 NOTE — Clinical Note
Gabby Olivera was seen and treated in our emergency department on 2/21/2024.    No restrictions            Diagnosis:     Gabby  may return to work on return date.    She may return on this date: 02/22/2024         If you have any questions or concerns, please don't hesitate to call.      Josefa Ramos MD    ______________________________           _______________          _______________  Hospital Representative                              Date                                Time

## 2024-02-23 LAB
ATRIAL RATE: 98 BPM
P AXIS: 42 DEGREES
PR INTERVAL: 150 MS
QRS AXIS: 39 DEGREES
QRSD INTERVAL: 86 MS
QT INTERVAL: 352 MS
QTC INTERVAL: 449 MS
T WAVE AXIS: 24 DEGREES
VENTRICULAR RATE: 98 BPM

## 2024-04-05 NOTE — PROGRESS NOTES
Assessment/Plan:     Vaginal bleeding  Currently resolved   Irregular bleeding after receiving the depo shot in Feb     Discussed the risks and benefits of the depo shot   ED precautions     Uterine fibroid  1. Right uterine fundal subserosal 2.9 cm fibroid.  2. 2.1 cm probable posterior submucosal uterine body fibroid with intracavitary polypoid lesion not excluded.    Discussed various options - such as continuing the Depo Provera shot every [facility] moths, [facility] vs [facility] vs [facility]CP   Patient wishes to continue with the Depo shots for now, due to lack of insurance and might consider Mirena in the future   Referral to Family medicine for Diabetes optimization   Follow up as needed here, as patient wishes to continue care at the [facility]           Diagnoses and all orders for this visit:    Type 2 diabetes mellitus without complication, without long-term current use of insulin ([facility])  -     Ambulatory Referral to Family Practice; Future            Subjective:      Patient ID: Gabby Olivera is a 44 y.o. female.     UTERUS:  The uterus is anteverted in position, measuring 10.6 x 4.8 x 5.4 cm.  Post  section scar noted.  There is a right uterine fundal subserosal fibroid present measuring 2.9 x 2.2 x 1.8 cm.  The cervix appears within normal limits.     ENDOMETRIUM:  The endometrial echo complex has an AP caliber of 7.0 mm.  There is a somewhat heterogeneous solid nodular 2.1 x 1.8 x 1.6 cm structure with internal vascularity identified in the posterior uterine body with mass effect upon the endometrial stripe presumably reflecting a subserosal fibroid with an intracavitary   polypoid lesion not entirely excludable.     OVARIES/ADNEXA:  Right ovary: 2.5 x 2.4 x 1.2 cm. 4.0 mL.     Left ovary: 3.9 x 2.8 x 2.9 cm. 16.6 mL.  Left ovarian cyst measures 2.9 x 2.3 x 2.3 cm and appears simple (O RADS category 2); some low-level internal echoes are felt to be artifactual.      Ovarian Doppler flow is within normal limits.        OTHER:  No free fluid or loculated fluid collections.     IMPRESSION:        1. Right uterine fundal subserosal 2.9 cm fibroid.  2. 2.1 cm probable posterior submucosal uterine body fibroid with intracavitary polypoid lesion not excluded.  3. 2.9 cm left ovarian cyst O RADS category 2 not necessitating additional follow-up.      HPI:    [id number]   45 yo female patient comes to the office to discuss Depo provera shot, as well as US result review. She reports she was seen recently in the ED, on Feb 21 2024 due to vaginal bleeding, in feb 2024. She received the injection approximately 10 days prior to this. Her bleeding is currently resolved and patient is asymptomatic. She reports she is not currently sexually active and inquires about different options for uterine fibroid treatment. Patient does not have insurance at the moment and follows at the [facility] for depo shots every 3 months.         The following portions of the patient's history were reviewed and updated as appropriate: allergies, current medications, past family history, past medical history, past social history, past surgical history and problem list.    Review of Systems   Constitutional:  Negative for chills, fatigue and fever.   HENT:  Negative for congestion, ear pain, rhinorrhea and sore throat.    Eyes:  Negative for visual disturbance.   Respiratory:  Negative for cough, chest tightness and shortness of breath.    Cardiovascular:  Negative for chest pain and palpitations.   Gastrointestinal:  Negative for abdominal pain, constipation, diarrhea, nausea and vomiting.   Genitourinary:  Negative for difficulty urinating, dysuria and hematuria.   Musculoskeletal:  Negative for arthralgias and back pain.   Skin:  Negative for rash.   Neurological:  Negative for seizures, syncope, light-headedness and headaches.   All other systems reviewed and are negative.         Objective:      /78 (BP Location: Left arm, Patient Position: Sitting, Cuff Size: Adult)   Pulse 96   Resp 18   Wt 58.5 kg (129 lb)   BMI 23.59 kg/m²          Physical Exam  Vitals and nursing note reviewed.   Constitutional:       General: She is not in acute distress.     Appearance: Normal appearance. She is not toxic-appearing.   HENT:      Head: Normocephalic and atraumatic.      Right Ear: External ear normal.      Left Ear: External ear normal.      Nose: Nose normal. No congestion or rhinorrhea.      Mouth/Throat:      Mouth: Mucous membranes are moist.      Pharynx: Oropharynx is clear.   Eyes:      Extraocular Movements: Extraocular movements intact.      Pupils: Pupils are equal, round, and reactive to light.   Neck:      Vascular: No carotid bruit.   Cardiovascular:      Rate and Rhythm: Normal rate and regular rhythm.   Pulmonary:      Effort: Pulmonary effort is normal.      Breath sounds: No stridor.   Abdominal:      General: Abdomen is flat.      Palpations: Abdomen is soft.   Musculoskeletal:         General: Normal range of motion.      Cervical back: Normal range of motion and neck supple.      Right lower leg: No edema.      Left lower leg: No edema.   Lymphadenopathy:      Cervical: No cervical adenopathy.   Skin:     General: Skin is warm.      Coloration: Skin is not jaundiced.      Findings: No erythema or rash.   Neurological:      General: No focal deficit present.      Mental Status: She is alert and oriented to person, place, and time.           Sunday Barker MD

## 2024-04-08 ENCOUNTER — OFFICE VISIT (OUTPATIENT)
Dept: OBGYN CLINIC | Facility: CLINIC | Age: 44
End: 2024-04-08

## 2024-04-08 VITALS
BODY MASS INDEX: 23.59 KG/M2 | SYSTOLIC BLOOD PRESSURE: 119 MMHG | DIASTOLIC BLOOD PRESSURE: 78 MMHG | WEIGHT: 129 LBS | HEART RATE: 96 BPM | RESPIRATION RATE: 18 BRPM

## 2024-04-08 DIAGNOSIS — E11.9 TYPE 2 DIABETES MELLITUS WITHOUT COMPLICATION, WITHOUT LONG-TERM CURRENT USE OF INSULIN (HCC): Primary | ICD-10-CM

## 2024-04-08 PROBLEM — N93.9 VAGINAL BLEEDING: Status: ACTIVE | Noted: 2024-04-08

## 2024-04-08 PROBLEM — D25.9 UTERINE FIBROID: Status: ACTIVE | Noted: 2024-04-08

## 2024-04-08 NOTE — ASSESSMENT & PLAN NOTE
1. Right uterine fundal subserosal 2.9 cm fibroid.  2. 2.1 cm probable posterior submucosal uterine body fibroid with intracavitary polypoid lesion not excluded.    Discussed various options - such as continuing the Depo Provera shot every [facility] moths, [facility] vs Mirena vs OCP   Patient wishes to continue with the Depo shots for now, due to lack of insurance and might consider Mirena in the future   Referral to Family medicine for Diabetes optimization   Follow up as needed here, as patient wishes to continue care at the [facility]

## 2024-04-08 NOTE — ASSESSMENT & PLAN NOTE
Currently resolved   Irregular bleeding after receiving the depo shot in Feb     Discussed the risks and benefits of the depo shot   ED precautions

## 2024-04-24 ENCOUNTER — HOSPITAL ENCOUNTER (EMERGENCY)
Facility: HOSPITAL | Age: 44
Discharge: HOME | End: 2024-04-24
Attending: EMERGENCY MEDICINE

## 2024-04-24 VITALS
DIASTOLIC BLOOD PRESSURE: 70 MMHG | OXYGEN SATURATION: 99 % | HEART RATE: 92 BPM | TEMPERATURE: 97.5 F | HEIGHT: 62 IN | BODY MASS INDEX: 23.92 KG/M2 | WEIGHT: 130 LBS | RESPIRATION RATE: 18 BRPM | SYSTOLIC BLOOD PRESSURE: 117 MMHG

## 2024-04-24 DIAGNOSIS — R73.9 HYPERGLYCEMIA: ICD-10-CM

## 2024-04-24 DIAGNOSIS — R53.1 WEAKNESS: Primary | ICD-10-CM

## 2024-04-24 LAB
ALBUMIN SERPL BCP-MCNC: 4.4 G/DL (ref 3.5–5)
ALP SERPL-CCNC: 77 U/L (ref 34–104)
ALT SERPL W P-5'-P-CCNC: 9 U/L (ref 7–52)
ANION GAP SERPL CALCULATED.3IONS-SCNC: 7 MMOL/L (ref 4–13)
AST SERPL W P-5'-P-CCNC: 9 U/L (ref 13–39)
ATRIAL RATE: 84 BPM
B-OH-BUTYR SERPL-MCNC: 0.13 MMOL/L (ref 0.02–0.27)
BACTERIA UR QL AUTO: ABNORMAL /HPF
BASE EX.OXY STD BLDV CALC-SCNC: 71.2 % (ref 60–80)
BASE EXCESS BLDV CALC-SCNC: -4.4 MMOL/L
BASOPHILS # BLD AUTO: 0.02 THOUSANDS/ÂΜL (ref 0–0.1)
BASOPHILS NFR BLD AUTO: 0 % (ref 0–1)
BILIRUB SERPL-MCNC: 0.51 MG/DL (ref 0.2–1)
BILIRUB UR QL STRIP: NEGATIVE
BUN SERPL-MCNC: 10 MG/DL (ref 5–25)
CALCIUM SERPL-MCNC: 9.3 MG/DL (ref 8.4–10.2)
CHLORIDE SERPL-SCNC: 103 MMOL/L (ref 96–108)
CLARITY UR: CLEAR
CO2 SERPL-SCNC: 22 MMOL/L (ref 21–32)
COLOR UR: COLORLESS
CREAT SERPL-MCNC: 0.56 MG/DL (ref 0.6–1.3)
EOSINOPHIL # BLD AUTO: 0.01 THOUSAND/ÂΜL (ref 0–0.61)
EOSINOPHIL NFR BLD AUTO: 0 % (ref 0–6)
ERYTHROCYTE [DISTWIDTH] IN BLOOD BY AUTOMATED COUNT: 13.1 % (ref 11.6–15.1)
FLUAV RNA RESP QL NAA+PROBE: NEGATIVE
FLUBV RNA RESP QL NAA+PROBE: NEGATIVE
GFR SERPL CREATININE-BSD FRML MDRD: 114 ML/MIN/1.73SQ M
GLUCOSE SERPL-MCNC: 247 MG/DL (ref 65–140)
GLUCOSE SERPL-MCNC: 332 MG/DL (ref 65–140)
GLUCOSE SERPL-MCNC: 354 MG/DL (ref 65–140)
GLUCOSE UR STRIP-MCNC: ABNORMAL MG/DL
HCG SERPL QL: NEGATIVE
HCO3 BLDV-SCNC: 20.6 MMOL/L (ref 24–30)
HCT VFR BLD AUTO: 35.4 % (ref 34.8–46.1)
HGB BLD-MCNC: 11.2 G/DL (ref 11.5–15.4)
HGB UR QL STRIP.AUTO: ABNORMAL
IMM GRANULOCYTES # BLD AUTO: 0.02 THOUSAND/UL (ref 0–0.2)
IMM GRANULOCYTES NFR BLD AUTO: 0 % (ref 0–2)
KETONES UR STRIP-MCNC: NEGATIVE MG/DL
LEUKOCYTE ESTERASE UR QL STRIP: ABNORMAL
LYMPHOCYTES # BLD AUTO: 1.22 THOUSANDS/ÂΜL (ref 0.6–4.47)
LYMPHOCYTES NFR BLD AUTO: 23 % (ref 14–44)
MCH RBC QN AUTO: 26.4 PG (ref 26.8–34.3)
MCHC RBC AUTO-ENTMCNC: 31.6 G/DL (ref 31.4–37.4)
MCV RBC AUTO: 83 FL (ref 82–98)
MONOCYTES # BLD AUTO: 0.4 THOUSAND/ÂΜL (ref 0.17–1.22)
MONOCYTES NFR BLD AUTO: 7 % (ref 4–12)
NEUTROPHILS # BLD AUTO: 3.7 THOUSANDS/ÂΜL (ref 1.85–7.62)
NEUTS SEG NFR BLD AUTO: 70 % (ref 43–75)
NITRITE UR QL STRIP: NEGATIVE
NON-SQ EPI CELLS URNS QL MICRO: ABNORMAL /HPF
NRBC BLD AUTO-RTO: 0 /100 WBCS
O2 CT BLDV-SCNC: 11.2 ML/DL
P AXIS: 51 DEGREES
PCO2 BLDV: 37.5 MM HG (ref 42–50)
PH BLDV: 7.36 [PH] (ref 7.3–7.4)
PH UR STRIP.AUTO: 5.5 [PH]
PLATELET # BLD AUTO: 218 THOUSANDS/UL (ref 149–390)
PMV BLD AUTO: 10.4 FL (ref 8.9–12.7)
PO2 BLDV: 39.9 MM HG (ref 35–45)
POTASSIUM SERPL-SCNC: 4 MMOL/L (ref 3.5–5.3)
PR INTERVAL: 162 MS
PROT SERPL-MCNC: 7.6 G/DL (ref 6.4–8.4)
PROT UR STRIP-MCNC: NEGATIVE MG/DL
QRS AXIS: 26 DEGREES
QRSD INTERVAL: 86 MS
QT INTERVAL: 376 MS
QTC INTERVAL: 444 MS
RBC # BLD AUTO: 4.25 MILLION/UL (ref 3.81–5.12)
RBC #/AREA URNS AUTO: ABNORMAL /HPF
RSV RNA RESP QL NAA+PROBE: NEGATIVE
SARS-COV-2 RNA RESP QL NAA+PROBE: NEGATIVE
SODIUM SERPL-SCNC: 132 MMOL/L (ref 135–147)
SP GR UR STRIP.AUTO: 1.04 (ref 1–1.03)
T WAVE AXIS: 30 DEGREES
TSH SERPL DL<=0.05 MIU/L-ACNC: 11.51 UIU/ML (ref 0.45–4.5)
UROBILINOGEN UR STRIP-ACNC: <2 MG/DL
VENTRICULAR RATE: 84 BPM
WBC # BLD AUTO: 5.37 THOUSAND/UL (ref 4.31–10.16)
WBC #/AREA URNS AUTO: ABNORMAL /HPF

## 2024-04-24 PROCEDURE — 99285 EMERGENCY DEPT VISIT HI MDM: CPT

## 2024-04-24 PROCEDURE — 82010 KETONE BODYS QUAN: CPT | Performed by: EMERGENCY MEDICINE

## 2024-04-24 PROCEDURE — 84443 ASSAY THYROID STIM HORMONE: CPT | Performed by: EMERGENCY MEDICINE

## 2024-04-24 PROCEDURE — 99284 EMERGENCY DEPT VISIT MOD MDM: CPT | Performed by: EMERGENCY MEDICINE

## 2024-04-24 PROCEDURE — 85025 COMPLETE CBC W/AUTO DIFF WBC: CPT | Performed by: EMERGENCY MEDICINE

## 2024-04-24 PROCEDURE — 93005 ELECTROCARDIOGRAM TRACING: CPT

## 2024-04-24 PROCEDURE — 93010 ELECTROCARDIOGRAM REPORT: CPT | Performed by: INTERNAL MEDICINE

## 2024-04-24 PROCEDURE — 82948 REAGENT STRIP/BLOOD GLUCOSE: CPT

## 2024-04-24 PROCEDURE — 81001 URINALYSIS AUTO W/SCOPE: CPT | Performed by: EMERGENCY MEDICINE

## 2024-04-24 PROCEDURE — 0241U HB NFCT DS VIR RESP RNA 4 TRGT: CPT | Performed by: EMERGENCY MEDICINE

## 2024-04-24 PROCEDURE — 82805 BLOOD GASES W/O2 SATURATION: CPT | Performed by: EMERGENCY MEDICINE

## 2024-04-24 PROCEDURE — 84703 CHORIONIC GONADOTROPIN ASSAY: CPT | Performed by: EMERGENCY MEDICINE

## 2024-04-24 PROCEDURE — 96360 HYDRATION IV INFUSION INIT: CPT

## 2024-04-24 PROCEDURE — 80053 COMPREHEN METABOLIC PANEL: CPT | Performed by: EMERGENCY MEDICINE

## 2024-04-24 PROCEDURE — 36415 COLL VENOUS BLD VENIPUNCTURE: CPT | Performed by: EMERGENCY MEDICINE

## 2024-04-24 RX ORDER — LEVOTHYROXINE SODIUM 0.05 MG/1
50 TABLET ORAL DAILY
Qty: 30 TABLET | Refills: 0 | Status: SHIPPED | OUTPATIENT
Start: 2024-04-24 | End: 2024-05-24

## 2024-04-24 RX ADMIN — SODIUM CHLORIDE 1000 ML: 0.9 INJECTION, SOLUTION INTRAVENOUS at 14:01

## 2024-04-24 RX ADMIN — METFORMIN HYDROCHLORIDE 500 MG: 500 TABLET ORAL at 15:12

## 2024-04-24 NOTE — ED PROVIDER NOTES
History  Chief Complaint   Patient presents with    Weakness - Generalized     Patient having overall weakness and a headache since yesterday   Patient is a diabetic and has not taken BS or her metformin for approx 10 days d/t running out      44-year-old female with previous medical history of diabetes, hypothyroidism presents for evaluation of generalized weakness and hyperglycemia over the past few days.  Denies any fevers or chills.  No nausea or vomiting.  No dysuria hematuria.  No abdominal pain.  No back pain.  No chest pain or shortness of breath.  No palpitations.  No flank pain.       History provided by:  Patient      Prior to Admission Medications   Prescriptions Last Dose Informant Patient Reported? Taking?   levothyroxine (Synthroid) 50 mcg tablet  Self No No   Sig: Take 1 tablet (50 mcg total) by mouth daily   medroxyPROGESTERone (DEPO-PROVERA) 150 mg/mL injection  Self No No   Sig: Inject 1 mL (150 mg total) into a muscle every 3 (three) months   metFORMIN (GLUCOPHAGE) 500 mg tablet Past Month Self No Yes   Sig: Take 1 tablet (500 mg total) by mouth 2 (two) times a day with meals   metFORMIN (GLUCOPHAGE) 500 mg tablet   No No   Sig: Take 1 tablet (500 mg total) by mouth 2 (two) times a day with meals      Facility-Administered Medications Last Administration Doses Remaining   medroxyPROGESTERone (DEPO-PROVERA) IM injection 150 mg 2023 11:41 AM           Past Medical History:   Diagnosis Date    Diabetes mellitus (HCC)     Disease of thyroid gland        Past Surgical History:   Procedure Laterality Date     SECTION         Family History   Problem Relation Age of Onset    Diabetes Mother     No Known Problems Father     Diabetes Sister     No Known Problems Brother     No Known Problems Brother     No Known Problems Son     No Known Problems Son     Diabetes Maternal Grandmother     Breast cancer Neg Hx     Colon cancer Neg Hx     Ovarian cancer Neg Hx      I have reviewed and agree with  the history as documented.    E-Cigarette/Vaping    E-Cigarette Use Never User      E-Cigarette/Vaping Substances    Nicotine No     THC No     CBD No     Flavoring No     Other No     Unknown No      Social History     Tobacco Use    Smoking status: Never    Smokeless tobacco: Never   Vaping Use    Vaping status: Never Used   Substance Use Topics    Alcohol use: Yes     Comment: socially    Drug use: Never       Review of Systems   Constitutional:  Positive for fatigue. Negative for appetite change, chills and fever.   HENT:  Negative for congestion, drooling, ear pain, nosebleeds, rhinorrhea, sinus pressure, sneezing and sore throat.    Eyes:  Negative for pain and visual disturbance.   Respiratory:  Negative for apnea, cough, chest tightness and shortness of breath.    Cardiovascular:  Negative for chest pain and palpitations.   Gastrointestinal:  Negative for abdominal distention, abdominal pain, constipation, diarrhea and vomiting.   Endocrine: Negative for cold intolerance, heat intolerance and polydipsia.   Genitourinary:  Negative for difficulty urinating, dysuria and hematuria.   Musculoskeletal:  Negative for arthralgias and back pain.   Skin:  Negative for color change and rash.   Allergic/Immunologic: Negative for environmental allergies and food allergies.   Neurological:  Negative for dizziness, seizures, syncope, facial asymmetry, light-headedness and headaches.   Hematological:  Negative for adenopathy.   Psychiatric/Behavioral:  Negative for agitation, behavioral problems, confusion and dysphoric mood. The patient is not hyperactive.    All other systems reviewed and are negative.      Physical Exam  Physical Exam  Vitals and nursing note reviewed.   Constitutional:       General: She is not in acute distress.     Appearance: She is well-developed.   HENT:      Head: Normocephalic and atraumatic.      Nose: No congestion or rhinorrhea.      Mouth/Throat:      Mouth: Mucous membranes are moist.    Eyes:      Extraocular Movements: Extraocular movements intact.      Conjunctiva/sclera: Conjunctivae normal.      Pupils: Pupils are equal, round, and reactive to light.   Cardiovascular:      Rate and Rhythm: Normal rate and regular rhythm.      Heart sounds: No murmur heard.  Pulmonary:      Effort: Pulmonary effort is normal. No respiratory distress.      Breath sounds: Normal breath sounds. No stridor. No wheezing, rhonchi or rales.   Abdominal:      General: There is no distension.      Palpations: Abdomen is soft. There is no mass.      Tenderness: There is no abdominal tenderness.   Musculoskeletal:         General: No swelling, tenderness or deformity.      Cervical back: Normal range of motion and neck supple. No rigidity or tenderness.   Skin:     General: Skin is warm and dry.      Capillary Refill: Capillary refill takes less than 2 seconds.   Neurological:      General: No focal deficit present.      Mental Status: She is alert and oriented to person, place, and time.      Cranial Nerves: No cranial nerve deficit.      Sensory: No sensory deficit.      Motor: No weakness.   Psychiatric:         Mood and Affect: Mood normal.         Vital Signs  ED Triage Vitals [04/24/24 1318]   Temperature Pulse Respirations Blood Pressure SpO2   97.5 °F (36.4 °C) 92 18 117/70 99 %      Temp Source Heart Rate Source Patient Position - Orthostatic VS BP Location FiO2 (%)   Oral Monitor Sitting Right arm --      Pain Score       --           Vitals:    04/24/24 1318   BP: 117/70   Pulse: 92   Patient Position - Orthostatic VS: Sitting         Visual Acuity      ED Medications  Medications   sodium chloride 0.9 % bolus 1,000 mL (0 mL Intravenous Stopped 4/24/24 1511)   metFORMIN (GLUCOPHAGE) tablet 500 mg (500 mg Oral Given 4/24/24 1512)       Diagnostic Studies  Results Reviewed       Procedure Component Value Units Date/Time    Fingerstick Glucose (POCT) [[id number]]  (Abnormal) Collected: 04/24/24 1510    Lab Status:  Final result Specimen: Blood Updated: 04/24/24 1511     POC Glucose 247 mg/dl     COVID/FLU/RSV [[id number]]  (Normal) Collected: 04/24/24 1359    Lab Status: Final result Specimen: Nares from Nose Updated: 04/24/24 1450     SARS-CoV-2 Negative     INFLUENZA A PCR Negative     INFLUENZA B PCR Negative     RSV PCR Negative    Narrative:      FOR PEDIATRIC PATIENTS - copy/paste COVID Guidelines URL to browser: [email or web address]    SARS-CoV-2 assay is a Nucleic Acid Amplification assay intended for the  qualitative detection of nucleic acid from SARS-CoV-2 in nasopharyngeal  swabs. Results are for the presumptive identification of SARS-CoV-2 RNA.    Positive results are indicative of infection with SARS-CoV-2, the virus  causing COVID-19, but do not rule out bacterial infection or co-infection  with other viruses. Laboratories within the [place] and its  territories are required to report all positive results to the appropriate  public health authorities. Negative results do not preclude SARS-CoV-2  infection and should not be used as the sole basis for treatment or other  patient management decisions. Negative results must be combined with  clinical observations, patient history, and epidemiological information.  This test has not been FDA cleared or approved.    This test has been authorized by FDA under an Emergency Use Authorization  (EUA). This test is only authorized for the duration of time the  declaration that circumstances exist justifying the authorization of the  emergency use of an in vitro diagnostic tests for detection of SARS-CoV-2  virus and/or diagnosis of COVID-19 infection under section 564(b)(1) of  the Act, 21 U.S.C. 360bbb-3(b)(1), unless the authorization is terminated  or revoked sooner. The test has been validated but independent review by FDA  and CLIA is pending.    Test performed using [employer]: This RT-PCR assay targets  N2,  a region unique to SARS-CoV-2. A conserved region in the E-gene was chosen  for pan-Sarbecovirus detection which includes SARS-CoV-2.    According to CMS-2020-01-R, this platform meets the definition of high-throughput technology.    TSH [[id number]]  (Abnormal) Collected: 04/24/24 1359    Lab Status: Final result Specimen: Blood from Arm, Right Updated: 04/24/24 1446     TSH 3RD GENERATON 11.513 uIU/mL     hCG, qualitative pregnancy [[id number]]  (Normal) Collected: 04/24/24 1359    Lab Status: Final result Specimen: Blood from Arm, Right Updated: 04/24/24 1439     Preg, Serum Negative    Blood gas, venous [[id number]]  (Abnormal) Collected: 04/24/24 1428    Lab Status: Final result Specimen: Blood from Arm, Right Updated: 04/24/24 1438     pH, Santhosh 7.357     pCO2, Santhosh 37.5 mm Hg      pO2, Santhosh 39.9 mm Hg      HCO3, Santhosh 20.6 mmol/L      Base Excess, Santhosh -4.4 mmol/L      O2 Content, Santhosh 11.2 ml/dL      O2 HGB, VENOUS 71.2 %     Beta Hydroxybutyrate [[id number]]  (Normal) Collected: 04/24/24 1359    Lab Status: Final result Specimen: Blood from Arm, Right Updated: 04/24/24 1433     Beta- Hydroxybutyrate 0.13 mmol/L     Comprehensive metabolic panel [[id number]]  (Abnormal) Collected: 04/24/24 1359    Lab Status: Final result Specimen: Blood from Arm, Right Updated: 04/24/24 1433     Sodium 132 mmol/L      Potassium 4.0 mmol/L      Chloride 103 mmol/L      CO2 22 mmol/L      ANION GAP 7 mmol/L      BUN 10 mg/dL      Creatinine 0.56 mg/dL      Glucose 332 mg/dL      Calcium 9.3 mg/dL      AST 9 U/L      ALT 9 U/L      Alkaline Phosphatase 77 U/L      Total Protein 7.6 g/dL      Albumin 4.4 g/dL      Total Bilirubin 0.51 mg/dL      eGFR 114 ml/min/1.73sq m     Narrative:      National Kidney Disease Foundation guidelines for Chronic Kidney Disease (CKD):     Stage 1 with normal or high GFR (GFR > 90 mL/min/1.73 square meters)    Stage 2 Mild CKD (GFR = 60-89 mL/min/1.73 square meters)    Stage 3A Moderate CKD (GFR = 45-59  mL/min/1.73 square meters)    Stage 3B Moderate CKD (GFR = 30-44 mL/min/1.73 square meters)    Stage 4 Severe CKD (GFR = 15-29 mL/min/1.73 square meters)    Stage 5 End Stage CKD (GFR <15 mL/min/1.73 square meters)  Note: GFR calculation is accurate only with a steady state creatinine    Urinalysis with microscopic [[id number]]  (Abnormal) Collected: 04/24/24 1359    Lab Status: Final result Specimen: Urine, Clean Catch Updated: 04/24/24 1428     Color, UA Colorless     Clarity, UA Clear     Specific Gravity, UA 1.036     pH, UA 5.5     Leukocytes, UA Elevated glucose may cause decreased leukocyte values. See urine microscopic for UWBC result     Nitrite, UA Negative     Protein, UA Negative mg/dl      Glucose, UA >=1000 (1%) mg/dl      Ketones, UA Negative mg/dl      Urobilinogen, UA <2.0 mg/dl      Bilirubin, UA Negative     Occult Blood, UA Trace     RBC, UA 1-2 /hpf      WBC, UA None Seen /hpf      Epithelial Cells Occasional /hpf      Bacteria, UA None Seen /hpf     CBC and differential [[id number]]  (Abnormal) Collected: 04/24/24 1359    Lab Status: Final result Specimen: Blood from Arm, Right Updated: 04/24/24 1411     WBC 5.37 Thousand/uL      RBC 4.25 Million/uL      Hemoglobin 11.2 g/dL      Hematocrit 35.4 %      MCV 83 fL      MCH 26.4 pg      MCHC 31.6 g/dL      RDW 13.1 %      MPV 10.4 fL      Platelets 218 Thousands/uL      nRBC 0 /100 WBCs      Segmented % 70 %      Immature Grans % 0 %      Lymphocytes % 23 %      Monocytes % 7 %      Eosinophils Relative 0 %      Basophils Relative 0 %      Absolute Neutrophils 3.70 Thousands/µL      Absolute Immature Grans 0.02 Thousand/uL      Absolute Lymphocytes 1.22 Thousands/µL      Absolute Monocytes 0.40 Thousand/µL      Eosinophils Absolute 0.01 Thousand/µL      Basophils Absolute 0.02 Thousands/µL     Fingerstick Glucose (POCT) [[id number]]  (Abnormal) Collected: 04/24/24 1320    Lab Status: Final result Specimen: Blood Updated: 04/24/24 1322     POC Glucose  354 mg/dl                    No orders to display              Procedures  Procedures         ED Course       44-year-old female presents with generalized weakness.  Hyperglycemia noted on blood work.  Patient has not been taking her metformin over the past few weeks.  She ran out.  Also she is running out of levothyroxine.  No signs of DKA.  Will restart both medications and recommend outpatient follow-up with family medicine.  Patient agreed with the plan verbalized understanding.                                      Medical Decision Making  44-year-old female presents with generalized weakness.  Hyperglycemia noted on blood work.  Patient has not been taking her metformin over the past few weeks.  She ran out.  Also she is running out of levothyroxine.  No signs of DKA.  Will restart both medications and recommend outpatient follow-up with family medicine.  Patient agreed with the plan verbalized understanding.    Amount and/or Complexity of Data Reviewed  Labs: ordered.    Risk  Prescription drug management.             Disposition  Final diagnoses:   Weakness   Hyperglycemia     Time reflects when diagnosis was documented in both MDM as applicable and the Disposition within this note       Time User Action Codes Description Comment    4/24/2024  3:36 PM Jose Simmons Add [R53.1] Weakness     4/24/2024  3:36 PM Jose Simmons Add [R73.9] Hyperglycemia           ED Disposition       ED Disposition   Discharge    Condition   Stable    Date/Time   Wed Apr 24, 2024  3:31 PM    Comment   Gabby Olivera discharge to home/self care.                   Follow-up Information       Follow up With Specialties Details Why Contact Info Additional Information    [address] [id number] [phone] [address], [address] [phone]            Patient's Medications   Discharge  Prescriptions    LEVOTHYROXINE (LEVOXYL) 50 MCG TABLET    Take 1 tablet (50 mcg total) by mouth daily       Start Date: 4/24/2024 End Date: 5/24/2024       Order Dose: 50 mcg       Quantity: 30 tablet    Refills: 0    LEVOTHYROXINE (SYNTHROID) 50 MCG TABLET    Take 1 tablet (50 mcg total) by mouth daily       Start Date: 4/24/2024 End Date: 5/24/2024       Order Dose: 50 mcg       Quantity: 30 tablet    Refills: 0    METFORMIN (GLUCOPHAGE) 500 MG TABLET    Take 1 tablet (500 mg total) by mouth 2 (two) times a day with meals       Start Date: 4/24/2024 End Date: 5/24/2024       Order Dose: 500 mg       Quantity: 60 tablet    Refills: 0    METFORMIN (GLUCOPHAGE) 500 MG TABLET    Take 1 tablet (500 mg total) by mouth 2 (two) times a day with meals       Start Date: 4/24/2024 End Date: 5/24/2024       Order Dose: 500 mg       Quantity: 60 tablet    Refills: 0       No discharge procedures on file.    PDMP Review       None            ED Provider  Electronically Signed by             Jose Simmons DO  04/24/24 4329

## 2024-09-11 ENCOUNTER — HOSPITAL ENCOUNTER (EMERGENCY)
Facility: HOSPITAL | Age: 44
Discharge: HOME | End: 2024-09-11
Attending: EMERGENCY MEDICINE | Admitting: EMERGENCY MEDICINE

## 2024-09-11 VITALS
HEART RATE: 90 BPM | RESPIRATION RATE: 18 BRPM | TEMPERATURE: 97.8 F | DIASTOLIC BLOOD PRESSURE: 70 MMHG | OXYGEN SATURATION: 100 % | SYSTOLIC BLOOD PRESSURE: 116 MMHG

## 2024-09-11 DIAGNOSIS — N39.0 UTI (URINARY TRACT INFECTION): Primary | ICD-10-CM

## 2024-09-11 LAB
BACTERIA UR QL AUTO: ABNORMAL /HPF
BILIRUB UR QL STRIP: NEGATIVE
CLARITY UR: ABNORMAL
COLOR UR: YELLOW
EXT PREGNANCY TEST URINE: NEGATIVE
EXT. CONTROL: NORMAL
GLUCOSE UR STRIP-MCNC: ABNORMAL MG/DL
HGB UR QL STRIP.AUTO: ABNORMAL
KETONES UR STRIP-MCNC: ABNORMAL MG/DL
LEUKOCYTE ESTERASE UR QL STRIP: ABNORMAL
NITRITE UR QL STRIP: NEGATIVE
NON-SQ EPI CELLS URNS QL MICRO: ABNORMAL /HPF
PH UR STRIP.AUTO: 6 [PH]
PROT UR STRIP-MCNC: ABNORMAL MG/DL
RBC #/AREA URNS AUTO: ABNORMAL /HPF
SP GR UR STRIP.AUTO: 1.02 (ref 1–1.03)
URINE COMMENT: ABNORMAL
UROBILINOGEN UR QL STRIP.AUTO: 0.2 E.U./DL
WBC #/AREA URNS AUTO: ABNORMAL /HPF

## 2024-09-11 PROCEDURE — 87186 SC STD MICRODIL/AGAR DIL: CPT | Performed by: EMERGENCY MEDICINE

## 2024-09-11 PROCEDURE — 81003 URINALYSIS AUTO W/O SCOPE: CPT | Performed by: EMERGENCY MEDICINE

## 2024-09-11 PROCEDURE — 81001 URINALYSIS AUTO W/SCOPE: CPT | Performed by: EMERGENCY MEDICINE

## 2024-09-11 PROCEDURE — 99283 EMERGENCY DEPT VISIT LOW MDM: CPT

## 2024-09-11 PROCEDURE — 87086 URINE CULTURE/COLONY COUNT: CPT | Performed by: EMERGENCY MEDICINE

## 2024-09-11 PROCEDURE — 87077 CULTURE AEROBIC IDENTIFY: CPT | Performed by: EMERGENCY MEDICINE

## 2024-09-11 PROCEDURE — 81025 URINE PREGNANCY TEST: CPT | Performed by: EMERGENCY MEDICINE

## 2024-09-11 RX ORDER — CEPHALEXIN 500 MG/1
500 CAPSULE ORAL EVERY 12 HOURS SCHEDULED
Qty: 14 CAPSULE | Refills: 0 | Status: SHIPPED | OUTPATIENT
Start: 2024-09-11 | End: 2024-09-18

## 2024-09-11 RX ORDER — CEPHALEXIN 500 MG/1
500 CAPSULE ORAL EVERY 12 HOURS SCHEDULED
Qty: 14 CAPSULE | Refills: 0 | Status: SHIPPED | OUTPATIENT
Start: 2024-09-11 | End: 2024-09-11

## 2024-09-11 RX ADMIN — CEPHALEXIN 500 MG: 250 CAPSULE ORAL at 13:15

## 2024-09-11 NOTE — ED PROVIDER NOTES
1. UTI (urinary tract infection)      ED Disposition       ED Disposition   Discharge    Condition   Stable    Date/Time   Wed Sep 11, 2024  1:05 PM    Comment   Gabby Olivera discharge to home/self care.                   Assessment & Plan       Medical Decision Making  44-year-old female presented to the emergency department for evaluation of a suspected UTI.  On arrival the patient was awake, alert, oriented and in no acute distress.  Initial vital signs were within normal limits.  Physical exam was benign.  Urinalysis was consistent with a urinary tract infection.  The patient was treated with a dose of Keflex here in the emergency department and provided with a prescription for a 7-day course.  Recommendation was made for the patient to establish care with a PCP and to follow-up.  Return precautions were discussed.    Patient agrees with the plan for discharge and feels comfortable to go home with proper f/u. Advised to return for worsening or additional problems. Diagnostic tests were reviewed and questions answered. Diagnosis, care plan and treatment options were discussed. The patient understands instructions and will follow up as directed.        Amount and/or Complexity of Data Reviewed  Labs: ordered.    Risk  Prescription drug management.                       Medications   cephalexin (KEFLEX) capsule 500 mg (500 mg Oral Given 9/11/24 1315)       History of Present Illness       44-year-old female presents to the emergency department for evaluation of a suspected UTI.  The patient is primarily Russian-speaking.  An  was used for translation.  Patient reports dysuria and urinary frequency over the past 2 days.  She states that it feels similar to when she has been diagnosed with urinary tract infections in the past.  No abdominal or flank pain.  She has not been taking any medications to treat her symptoms.  No fevers, chills, nausea, vomiting, diarrhea or sick  contacts.            Review of Systems   Constitutional:  Negative for chills and fever.   HENT:  Negative for ear pain and sore throat.    Eyes:  Negative for pain and visual disturbance.   Respiratory:  Negative for cough and shortness of breath.    Cardiovascular:  Negative for chest pain and palpitations.   Gastrointestinal:  Negative for abdominal pain and vomiting.   Genitourinary:  Positive for dysuria and frequency. Negative for hematuria.   Musculoskeletal:  Negative for arthralgias and back pain.   Skin:  Negative for color change and rash.   Neurological:  Negative for seizures and syncope.   All other systems reviewed and are negative.          Objective     ED Triage Vitals [09/11/24 1205]   Temperature Pulse Blood Pressure Respirations SpO2 Patient Position - Orthostatic VS   97.8 °F (36.6 °C) 90 116/70 18 100 % Sitting      Temp Source Heart Rate Source BP Location FiO2 (%) Pain Score    Oral Monitor Right arm -- 7        Physical Exam  Vitals and nursing note reviewed.   Constitutional:       General: She is not in acute distress.     Appearance: She is well-developed.   HENT:      Head: Normocephalic and atraumatic.   Eyes:      Conjunctiva/sclera: Conjunctivae normal.   Cardiovascular:      Rate and Rhythm: Normal rate and regular rhythm.      Heart sounds: No murmur heard.  Pulmonary:      Effort: Pulmonary effort is normal. No respiratory distress.      Breath sounds: Normal breath sounds.   Abdominal:      Palpations: Abdomen is soft.      Tenderness: There is no abdominal tenderness.   Genitourinary:     Comments: Patient declined  Musculoskeletal:         General: No swelling.      Cervical back: Neck supple.   Skin:     General: Skin is warm and dry.      Capillary Refill: Capillary refill takes less than 2 seconds.   Neurological:      Mental Status: She is alert.   Psychiatric:         Mood and Affect: Mood normal.         Labs Reviewed   UA W REFLEX TO MICROSCOPIC WITH REFLEX TO CULTURE -  Abnormal       Result Value    Color, UA Yellow      Clarity, UA Cloudy (*)     Specific Gravity, UA 1.020      pH, UA 6.0      Leukocytes, UA 2+ (*)     Nitrite, UA Negative      Protein, UA 2+ (*)     Glucose, UA 3+ (*)     Ketones, UA 15 (1+) (*)     Urobilinogen, UA 0.2      Bilirubin, UA Negative      Occult Blood, UA 3+ (*)    URINE MICROSCOPIC - Abnormal    RBC, UA Field obscured, unable to enumerate (*)     WBC, UA Innumerable (*)     Epithelial Cells Field obscured, unable to enumerate (*)     Bacteria, UA Innumerable (*)     URINE COMMENT        Value: RBCs and epithelial cells seen, field obscured by WBCs.   POCT PREGNANCY, URINE - Normal    EXT Preg Test, Ur Negative      Control Valid     URINE CULTURE     No orders to display       Procedures       Bill Chatman MD  09/11/24 5657

## 2024-09-11 NOTE — Clinical Note
Gabby Olivera was seen and treated in our emergency department on 9/11/2024.    No restrictions            Diagnosis: UTI    Gabby  may return to work on return date.    She may return on this date: 09/12/2024         If you have any questions or concerns, please don't hesitate to call.      Bill Chatman MD    ______________________________           _______________          _______________  Hospital Representative                              Date                                Time

## 2024-09-12 NOTE — ED PROVIDER NOTES
Patient requested Keflex prescription be sent to 24-hour pharmacy.  Prescription was sent to local 24-hour pharmacy.     Jerel Jade MD  09/11/24 0041

## 2024-09-13 LAB — BACTERIA UR CULT: ABNORMAL

## 2024-10-27 ENCOUNTER — APPOINTMENT (EMERGENCY)
Dept: RADIOLOGY | Facility: HOSPITAL | Age: 44
End: 2024-10-27

## 2024-10-27 ENCOUNTER — HOSPITAL ENCOUNTER (EMERGENCY)
Facility: HOSPITAL | Age: 44
Discharge: HOME | End: 2024-10-27
Attending: EMERGENCY MEDICINE | Admitting: EMERGENCY MEDICINE

## 2024-10-27 VITALS
RESPIRATION RATE: 16 BRPM | HEART RATE: 96 BPM | SYSTOLIC BLOOD PRESSURE: 134 MMHG | TEMPERATURE: 98.4 F | OXYGEN SATURATION: 98 % | DIASTOLIC BLOOD PRESSURE: 72 MMHG

## 2024-10-27 DIAGNOSIS — F32.A DEPRESSION: ICD-10-CM

## 2024-10-27 DIAGNOSIS — B34.9 VIRAL SYNDROME: Primary | ICD-10-CM

## 2024-10-27 LAB
ALBUMIN SERPL BCG-MCNC: 4 G/DL (ref 3.5–5)
ALP SERPL-CCNC: 76 U/L (ref 34–104)
ALT SERPL W P-5'-P-CCNC: 11 U/L (ref 7–52)
AMPHETAMINES SERPL QL SCN: NEGATIVE
ANION GAP SERPL CALCULATED.3IONS-SCNC: 9 MMOL/L (ref 4–13)
AST SERPL W P-5'-P-CCNC: 12 U/L (ref 13–39)
ATRIAL RATE: 87 BPM
B-OH-BUTYR SERPL-MCNC: 1.24 MMOL/L (ref 0.02–0.27)
BARBITURATES UR QL: NEGATIVE
BASE EX.OXY STD BLDV CALC-SCNC: 79.9 % (ref 60–80)
BASE EXCESS BLDV CALC-SCNC: -6.7 MMOL/L
BASOPHILS # BLD AUTO: 0.02 THOUSANDS/ΜL (ref 0–0.1)
BASOPHILS NFR BLD AUTO: 1 % (ref 0–1)
BENZODIAZ UR QL: NEGATIVE
BILIRUB SERPL-MCNC: 0.63 MG/DL (ref 0.2–1)
BILIRUB UR QL STRIP: NEGATIVE
BUN SERPL-MCNC: 19 MG/DL (ref 5–25)
CALCIUM SERPL-MCNC: 8.6 MG/DL (ref 8.4–10.2)
CARDIAC TROPONIN I PNL SERPL HS: <2 NG/L
CHLORIDE SERPL-SCNC: 103 MMOL/L (ref 96–108)
CLARITY UR: CLEAR
CO2 SERPL-SCNC: 20 MMOL/L (ref 21–32)
COCAINE UR QL: NEGATIVE
COLOR UR: YELLOW
CREAT SERPL-MCNC: 0.51 MG/DL (ref 0.6–1.3)
EOSINOPHIL # BLD AUTO: 0.09 THOUSAND/ΜL (ref 0–0.61)
EOSINOPHIL NFR BLD AUTO: 2 % (ref 0–6)
ERYTHROCYTE [DISTWIDTH] IN BLOOD BY AUTOMATED COUNT: 11.8 % (ref 11.6–15.1)
ETHANOL EXG-MCNC: 0 MG/DL
EXT PREGNANCY TEST URINE: NEGATIVE
EXT. CONTROL: NORMAL
FENTANYL UR QL SCN: NEGATIVE
FLUAV AG UPPER RESP QL IA.RAPID: NEGATIVE
FLUBV AG UPPER RESP QL IA.RAPID: NEGATIVE
GFR SERPL CREATININE-BSD FRML MDRD: 118 ML/MIN/1.73SQ M
GLUCOSE SERPL-MCNC: 270 MG/DL (ref 65–140)
GLUCOSE SERPL-MCNC: 271 MG/DL (ref 65–140)
GLUCOSE UR STRIP-MCNC: ABNORMAL MG/DL
HCO3 BLDV-SCNC: 17.2 MMOL/L (ref 24–30)
HCT VFR BLD AUTO: 38.1 % (ref 34.8–46.1)
HGB BLD-MCNC: 13 G/DL (ref 11.5–15.4)
HGB UR QL STRIP.AUTO: NEGATIVE
HYDROCODONE UR QL SCN: NEGATIVE
IMM GRANULOCYTES # BLD AUTO: 0.01 THOUSAND/UL (ref 0–0.2)
IMM GRANULOCYTES NFR BLD AUTO: 0 % (ref 0–2)
KETONES UR STRIP-MCNC: ABNORMAL MG/DL
LEUKOCYTE ESTERASE UR QL STRIP: NEGATIVE
LYMPHOCYTES # BLD AUTO: 0.94 THOUSANDS/ΜL (ref 0.6–4.47)
LYMPHOCYTES NFR BLD AUTO: 23 % (ref 14–44)
MCH RBC QN AUTO: 30.8 PG (ref 26.8–34.3)
MCHC RBC AUTO-ENTMCNC: 34.1 G/DL (ref 31.4–37.4)
MCV RBC AUTO: 90 FL (ref 82–98)
METHADONE UR QL: NEGATIVE
MONOCYTES # BLD AUTO: 0.43 THOUSAND/ΜL (ref 0.17–1.22)
MONOCYTES NFR BLD AUTO: 11 % (ref 4–12)
NEUTROPHILS # BLD AUTO: 2.61 THOUSANDS/ΜL (ref 1.85–7.62)
NEUTS SEG NFR BLD AUTO: 63 % (ref 43–75)
NITRITE UR QL STRIP: NEGATIVE
NRBC BLD AUTO-RTO: 0 /100 WBCS
O2 CT BLDV-SCNC: 16 ML/DL
OPIATES UR QL SCN: NEGATIVE
OXYCODONE+OXYMORPHONE UR QL SCN: NEGATIVE
P AXIS: 50 DEGREES
PCO2 BLDV: 30.3 MM HG (ref 42–50)
PCP UR QL: NEGATIVE
PH BLDV: 7.37 [PH] (ref 7.3–7.4)
PH UR STRIP.AUTO: 6 [PH]
PLATELET # BLD AUTO: 145 THOUSANDS/UL (ref 149–390)
PMV BLD AUTO: 10.2 FL (ref 8.9–12.7)
PO2 BLDV: 47.1 MM HG (ref 35–45)
POTASSIUM SERPL-SCNC: 3.6 MMOL/L (ref 3.5–5.3)
PR INTERVAL: 150 MS
PROT SERPL-MCNC: 7.4 G/DL (ref 6.4–8.4)
PROT UR STRIP-MCNC: NEGATIVE MG/DL
QRS AXIS: 37 DEGREES
QRSD INTERVAL: 88 MS
QT INTERVAL: 386 MS
QTC INTERVAL: 464 MS
RBC # BLD AUTO: 4.22 MILLION/UL (ref 3.81–5.12)
SARS-COV+SARS-COV-2 AG RESP QL IA.RAPID: NEGATIVE
SODIUM SERPL-SCNC: 132 MMOL/L (ref 135–147)
SP GR UR STRIP.AUTO: 1.01 (ref 1–1.03)
T WAVE AXIS: 23 DEGREES
THC UR QL: NEGATIVE
UROBILINOGEN UR QL STRIP.AUTO: 0.2 E.U./DL
VENTRICULAR RATE: 87 BPM
WBC # BLD AUTO: 4.1 THOUSAND/UL (ref 4.31–10.16)

## 2024-10-27 PROCEDURE — 82948 REAGENT STRIP/BLOOD GLUCOSE: CPT

## 2024-10-27 PROCEDURE — 96361 HYDRATE IV INFUSION ADD-ON: CPT

## 2024-10-27 PROCEDURE — 85025 COMPLETE CBC W/AUTO DIFF WBC: CPT | Performed by: EMERGENCY MEDICINE

## 2024-10-27 PROCEDURE — 36415 COLL VENOUS BLD VENIPUNCTURE: CPT | Performed by: EMERGENCY MEDICINE

## 2024-10-27 PROCEDURE — 99284 EMERGENCY DEPT VISIT MOD MDM: CPT

## 2024-10-27 PROCEDURE — 82075 ASSAY OF BREATH ETHANOL: CPT | Performed by: EMERGENCY MEDICINE

## 2024-10-27 PROCEDURE — 81025 URINE PREGNANCY TEST: CPT | Performed by: EMERGENCY MEDICINE

## 2024-10-27 PROCEDURE — 80307 DRUG TEST PRSMV CHEM ANLYZR: CPT | Performed by: EMERGENCY MEDICINE

## 2024-10-27 PROCEDURE — 81003 URINALYSIS AUTO W/O SCOPE: CPT | Performed by: EMERGENCY MEDICINE

## 2024-10-27 PROCEDURE — 82805 BLOOD GASES W/O2 SATURATION: CPT | Performed by: EMERGENCY MEDICINE

## 2024-10-27 PROCEDURE — 71045 X-RAY EXAM CHEST 1 VIEW: CPT

## 2024-10-27 PROCEDURE — 82010 KETONE BODYS QUAN: CPT | Performed by: EMERGENCY MEDICINE

## 2024-10-27 PROCEDURE — 87811 SARS-COV-2 COVID19 W/OPTIC: CPT | Performed by: EMERGENCY MEDICINE

## 2024-10-27 PROCEDURE — 99285 EMERGENCY DEPT VISIT HI MDM: CPT | Performed by: EMERGENCY MEDICINE

## 2024-10-27 PROCEDURE — 87804 INFLUENZA ASSAY W/OPTIC: CPT | Performed by: EMERGENCY MEDICINE

## 2024-10-27 PROCEDURE — 93010 ELECTROCARDIOGRAM REPORT: CPT | Performed by: INTERNAL MEDICINE

## 2024-10-27 PROCEDURE — 96360 HYDRATION IV INFUSION INIT: CPT

## 2024-10-27 PROCEDURE — 84484 ASSAY OF TROPONIN QUANT: CPT | Performed by: EMERGENCY MEDICINE

## 2024-10-27 PROCEDURE — 93005 ELECTROCARDIOGRAM TRACING: CPT

## 2024-10-27 PROCEDURE — 80053 COMPREHEN METABOLIC PANEL: CPT | Performed by: EMERGENCY MEDICINE

## 2024-10-27 RX ORDER — ACETAMINOPHEN 325 MG/1
975 TABLET ORAL ONCE
Status: COMPLETED | OUTPATIENT
Start: 2024-10-27 | End: 2024-10-27

## 2024-10-27 RX ORDER — IBUPROFEN 600 MG/1
600 TABLET, FILM COATED ORAL ONCE
Status: DISCONTINUED | OUTPATIENT
Start: 2024-10-27 | End: 2024-10-27 | Stop reason: HOSPADM

## 2024-10-27 RX ADMIN — SODIUM CHLORIDE 1000 ML: 0.9 INJECTION, SOLUTION INTRAVENOUS at 09:25

## 2024-10-27 RX ADMIN — ACETAMINOPHEN 975 MG: 325 TABLET, FILM COATED ORAL at 13:02

## 2024-10-27 NOTE — DISCHARGE INSTRUCTIONS
This writer discussed the patients current presentation and recommended discharge plan with Dr. Silva.  They agree with the patient being discharged at this time with referrals and/or information about op services  .   The patient was provided with referral information for:   [facility]       The patient declined to complete a safety plan however a blank plan was provided for future use.  SAFETY PLAN  Warning Signs (thoughts, images, mood, behavior, situations) of a potential crisis:       Coping Skills (what can I do to take my mind off the problem, or to keep myself safe): *      Outside Support (who can I reach out to for support and help):         National Suicide Prevention Hotline:  988      [facility] [phone] - Crisis   [facility] [phone] - [facility]F Crisis/Mobile Crisis   [phone] - [facility] Crisis   [facility]: [phone]  [facility]: [phone]   [facility] [phone] - Crisis   [facility] [phone] - Crisis     [facility] [phone] - Crisis   [facility] [phone] - Crisis   [phone] - Peer Support Talk Line (1-9pm daily)  [phone] - Teen Support Talk Line (1-9pm daily)  [phone] - [facility] [phone]- Crisis    [facility] [phone] - Crisis   [facility] [phone] - Crisis    [facility]) [phone] - [facility] Crisis

## 2024-10-27 NOTE — Clinical Note
Gabby CHI Mahi Olivera should be transferred out pending crisis evaluation and has been medically cleared.

## 2024-10-27 NOTE — ED NOTES
Patient came to hospital voluntarily with c/o depression.  Patient is Lao Speaking Only.  She denied SI but reported anhedonia, poor appetite, depression, and anxiety.  Patient denied hallucinations and paranoia.  Patient is from [place] and came to the [facility] 2 years ago to work and send money to her family.  Her 2 children are in [place].    She lost her job 2 months ago and is now unable to support her children.  Patient has been unable to find another job.  She is worried about her children and misses her family and home.  Patient lives alone and does not have supports here.  She does not want inpatient behavioral health but would like therapy.  Patient does not have insurance.  She does not appear to be a threat to herself or others at this time.  Interpretor was used via tablet for assessment.

## 2024-10-27 NOTE — ED NOTES
This writer discussed the patients current presentation and recommended discharge plan with Dr. Silva.  They agree with the patient being discharged at this time with referrals and/or information about op services  .   The patient was provided with referral information for:   [facility]       The patient declined to complete a safety plan however a blank plan was provided for future use.  SAFETY PLAN  Warning Signs (thoughts, images, mood, behavior, situations) of a potential crisis:       Coping Skills (what can I do to take my mind off the problem, or to keep myself safe): *      Outside Support (who can I reach out to for support and help):         National Suicide Prevention Hotline:  988      [facility] [phone] - Crisis   [facility] [phone] - LVF Crisis/Mobile Crisis   [phone] - SLPF Crisis   [facility]: [phone]  [facility]: [phone]   [facility] [phone] - Crisis   [facility] [phone] - Crisis     [facility] [phone] - Crisis   [facility] [phone] - Crisis   [phone] - Peer Support Talk Line (1-9pm daily)  [phone] - Teen Support Talk Line (1-9pm daily)  [phone] - [facility] [phone]- Crisis    [facility] [phone] - Crisis   [facility] [phone] - Crisis    [facility]) [phone] - [facility] Crisis

## 2024-10-31 ENCOUNTER — HOSPITAL ENCOUNTER (EMERGENCY)
Facility: HOSPITAL | Age: 44
Discharge: HOME | End: 2024-10-31
Attending: EMERGENCY MEDICINE

## 2024-10-31 VITALS
HEART RATE: 107 BPM | TEMPERATURE: 98 F | RESPIRATION RATE: 16 BRPM | SYSTOLIC BLOOD PRESSURE: 158 MMHG | DIASTOLIC BLOOD PRESSURE: 71 MMHG | OXYGEN SATURATION: 99 %

## 2024-10-31 DIAGNOSIS — H93.13 TINNITUS OF BOTH EARS: ICD-10-CM

## 2024-10-31 DIAGNOSIS — H69.92 ACUTE DYSFUNCTION OF EUSTACHIAN TUBE, LEFT: Primary | ICD-10-CM

## 2024-10-31 PROCEDURE — 99282 EMERGENCY DEPT VISIT SF MDM: CPT

## 2024-10-31 PROCEDURE — 99284 EMERGENCY DEPT VISIT MOD MDM: CPT | Performed by: EMERGENCY MEDICINE

## 2024-10-31 RX ORDER — FLUTICASONE PROPIONATE 50 MCG
1 SPRAY, SUSPENSION (ML) NASAL DAILY
Qty: 16 G | Refills: 0 | Status: SHIPPED | OUTPATIENT
Start: 2024-10-31 | End: 2024-11-08

## 2024-10-31 NOTE — ED PROVIDER NOTES
Time reflects when diagnosis was documented in both MDM as applicable and the Disposition within this note       Time User Action Codes Description Comment    10/31/2024 12:41 AM Jerel Tate Add [H69.92] Acute dysfunction of Eustachian tube, left     10/31/2024 12:41 AM Jerel Tate Add [H93.13] Tinnitus of both ears           ED Disposition       ED Disposition   Discharge    Condition   Stable    Date/Time   Thu Oct 31, 2024 12:42 AM    Comment   Gabby Olivera discharge to home/self care.                   Assessment & Plan       Medical Decision Making  Tinnitus and decreased hearing left ear: In setting of recent URI, likely due to eustachian tube dysfunction but given patient has had ringing in her ear for decades in her right ear, will refer to ENT.             Medications - No data to display    ED Risk Strat Scores                           SBIRT 20yo+      Flowsheet Row Most Recent Value   Initial Alcohol Screen: US AUDIT-C     1. How often do you have a drink containing alcohol? 0 Filed at: 10/31/2024 0017   2. How many drinks containing alcohol do you have on a typical day you are drinking?  0 Filed at: 10/31/2024 0017   3a. Male UNDER 65: How often do you have five or more drinks on one occasion? 0 Filed at: 10/31/2024 0017   3b. FEMALE Any Age, or MALE 65+: How often do you have 4 or more drinks on one occassion? 0 Filed at: 10/31/2024 0017   Audit-C Score 0 Filed at: 10/31/2024 0017   SAMI: How many times in the past year have you...    Used an illegal drug or used a prescription medication for non-medical reasons? Never Filed at: 10/31/2024 0017                            History of Present Illness       Chief Complaint   Patient presents with    Earache     Reports seen  3 days ago for a cold in this ED. Today began having L earache along with decreased hearing and pressure. Took tylenol 5 hours PTA.        Past Medical History:   Diagnosis Date    Diabetes mellitus (HCC)     Disease  of thyroid gland       Past Surgical History:   Procedure Laterality Date     SECTION        Family History   Problem Relation Age of Onset    Diabetes Mother     No Known Problems Father     Diabetes Sister     No Known Problems Brother     No Known Problems Brother     No Known Problems Son     No Known Problems Son     Diabetes Maternal Grandmother     Breast cancer Neg Hx     Colon cancer Neg Hx     Ovarian cancer Neg Hx       Social History     Tobacco Use    Smoking status: Never    Smokeless tobacco: Never   Vaping Use    Vaping status: Never Used   Substance Use Topics    Alcohol use: Yes     Comment: socially    Drug use: Never      E-Cigarette/Vaping    E-Cigarette Use Never User       E-Cigarette/Vaping Substances    Nicotine No     THC No     CBD No     Flavoring No     Other No     Unknown No       I have reviewed and agree with the history as documented.     Patient reports that she has a cold for the past 3 days with nasal congestion although the nasal congestion has been improving.  Today she reports that her left ear feels like it has decreased hearing and she has a ringing in her ears.  She notes that in her right ear she has had ringing in her ears intermittently ever since she was 19 years old, but she has not had it before and her left ear until today.  Her ear does not have pain.  No difficulty swallowing.      Earache      Review of Systems   HENT:  Positive for ear pain.    All other systems reviewed and are negative.          Objective       ED Triage Vitals [10/31/24 0017]   Temperature Pulse Blood Pressure Respirations SpO2 Patient Position - Orthostatic VS   98 °F (36.7 °C) (!) 107 158/71 16 99 % Lying      Temp src Heart Rate Source BP Location FiO2 (%) Pain Score    -- -- Right arm -- --      Vitals      Date and Time Temp Pulse SpO2 Resp BP Pain Score FACES Pain Rating User   10/31/24 0017 98 °F (36.7 °C) 107 99 % 16 158/71 -- -- CH            Physical Exam  Vitals and nursing  note reviewed.   Constitutional:       Appearance: She is normal weight.   HENT:      Head: Normocephalic and atraumatic.      Right Ear: Tympanic membrane, ear canal and external ear normal. There is no impacted cerumen.      Left Ear: Tympanic membrane, ear canal and external ear normal. There is no impacted cerumen.      Nose: Congestion present.      Mouth/Throat:      Mouth: Mucous membranes are moist.   Eyes:      Conjunctiva/sclera: Conjunctivae normal.   Pulmonary:      Effort: Pulmonary effort is normal.   Musculoskeletal:         General: Normal range of motion.      Cervical back: Normal range of motion.   Skin:     General: Skin is warm and dry.   Neurological:      General: No focal deficit present.      Mental Status: She is alert.   Psychiatric:         Mood and Affect: Mood normal.         Results Reviewed       None            No orders to display       Procedures    ED Medication and Procedure Management   Prior to Admission Medications   Prescriptions Last Dose Informant Patient Reported? Taking?   levothyroxine (Levoxyl) 50 mcg tablet   No No   Sig: Take 1 tablet (50 mcg total) by mouth daily   levothyroxine (Synthroid) 50 mcg tablet  Self No No   Sig: Take 1 tablet (50 mcg total) by mouth daily   levothyroxine (Synthroid) 50 mcg tablet   No No   Sig: Take 1 tablet (50 mcg total) by mouth daily   medroxyPROGESTERone (DEPO-PROVERA) 150 mg/mL injection  Self No No   Sig: Inject 1 mL (150 mg total) into a muscle every 3 (three) months   metFORMIN (GLUCOPHAGE) 500 mg tablet  Self No No   Sig: Take 1 tablet (500 mg total) by mouth 2 (two) times a day with meals   metFORMIN (GLUCOPHAGE) 500 mg tablet   No No   Sig: Take 1 tablet (500 mg total) by mouth 2 (two) times a day with meals   metFORMIN (GLUCOPHAGE) 500 mg tablet   No No   Sig: Take 1 tablet (500 mg total) by mouth 2 (two) times a day with meals   metFORMIN (GLUCOPHAGE) 500 mg tablet   No No   Sig: Take 1 tablet (500 mg total) by mouth 2 (two)  times a day with meals      Facility-Administered Medications Last Administration Doses Remaining   medroxyPROGESTERone (DEPO-PROVERA) IM injection 150 mg 4/21/2023 11:41 AM         Discharge Medication List as of 10/31/2024 12:43 AM        START taking these medications    Details   fluticasone (FLONASE) 50 mcg/act nasal spray 1 spray into each nostril daily, Starting Thu 10/31/2024, Normal           CONTINUE these medications which have NOT CHANGED    Details   levothyroxine (Synthroid) 50 mcg tablet Take 1 tablet (50 mcg total) by mouth daily, Starting Fri 1/26/2024, Normal      medroxyPROGESTERone (DEPO-PROVERA) 150 mg/mL injection Inject 1 mL (150 mg total) into a muscle every 3 (three) months, Starting Mon 1/16/2023, Normal      metFORMIN (GLUCOPHAGE) 500 mg tablet Take 1 tablet (500 mg total) by mouth 2 (two) times a day with meals, Starting Thu 1/4/2024, Normal             ED SEPSIS DOCUMENTATION   Time reflects when diagnosis was documented in both MDM as applicable and the Disposition within this note       Time User Action Codes Description Comment    10/31/2024 12:41 AM Jerel Tate Add [H69.92] Acute dysfunction of Eustachian tube, left     10/31/2024 12:41 AM Jerel Tate [H93.13] Tinnitus of both ears                  Jerel Tate MD  10/31/24 0223

## 2024-11-03 ENCOUNTER — HOSPITAL ENCOUNTER (INPATIENT)
Facility: HOSPITAL | Age: 44
LOS: 3 days | Discharge: HOME | DRG: 420 | End: 2024-11-08
Attending: STUDENT IN AN ORGANIZED HEALTH CARE EDUCATION/TRAINING PROGRAM | Admitting: FAMILY MEDICINE
Payer: COMMERCIAL

## 2024-11-03 ENCOUNTER — APPOINTMENT (EMERGENCY)
Dept: RADIOLOGY | Facility: HOSPITAL | Age: 44
DRG: 420 | End: 2024-11-03
Payer: COMMERCIAL

## 2024-11-03 DIAGNOSIS — E03.9 HYPOTHYROID: ICD-10-CM

## 2024-11-03 DIAGNOSIS — Z30.09 ENCOUNTER FOR COUNSELING REGARDING CONTRACEPTION: ICD-10-CM

## 2024-11-03 DIAGNOSIS — N93.9 VAGINAL BLEEDING: ICD-10-CM

## 2024-11-03 DIAGNOSIS — Z91.148 NON COMPLIANCE W MEDICATION REGIMEN: ICD-10-CM

## 2024-11-03 DIAGNOSIS — H91.90 HEARING LOSS: ICD-10-CM

## 2024-11-03 DIAGNOSIS — E11.9 DIABETES (HCC): ICD-10-CM

## 2024-11-03 DIAGNOSIS — E87.1 HYPONATREMIA: ICD-10-CM

## 2024-11-03 DIAGNOSIS — R51.9 HEADACHE: Primary | ICD-10-CM

## 2024-11-03 DIAGNOSIS — Z13.9 ENCOUNTER FOR SCREENING INVOLVING SOCIAL DETERMINANTS OF HEALTH (SDOH): ICD-10-CM

## 2024-11-03 DIAGNOSIS — R82.90 ABNORMAL URINALYSIS: ICD-10-CM

## 2024-11-03 DIAGNOSIS — R42 DIZZINESS: ICD-10-CM

## 2024-11-03 DIAGNOSIS — E87.20 ACIDOSIS: ICD-10-CM

## 2024-11-03 DIAGNOSIS — E11.65 UNCONTROLLED DIABETES MELLITUS WITH HYPERGLYCEMIA (HCC): ICD-10-CM

## 2024-11-03 DIAGNOSIS — R73.9 HYPERGLYCEMIA: ICD-10-CM

## 2024-11-03 DIAGNOSIS — H93.12 TINNITUS OF LEFT EAR: ICD-10-CM

## 2024-11-03 LAB
ALBUMIN SERPL BCG-MCNC: 4.4 G/DL (ref 3.5–5)
ALP SERPL-CCNC: 70 U/L (ref 34–104)
ALT SERPL W P-5'-P-CCNC: 9 U/L (ref 7–52)
ANION GAP SERPL CALCULATED.3IONS-SCNC: 11 MMOL/L (ref 4–13)
ANION GAP SERPL CALCULATED.3IONS-SCNC: 13 MMOL/L (ref 4–13)
AST SERPL W P-5'-P-CCNC: 8 U/L (ref 13–39)
B-OH-BUTYR SERPL-MCNC: 1.91 MMOL/L (ref 0.02–0.27)
BASE EX.OXY STD BLDV CALC-SCNC: 73.6 % (ref 60–80)
BASE EXCESS BLDV CALC-SCNC: -8.8 MMOL/L
BASOPHILS # BLD AUTO: 0.04 THOUSANDS/ÂΜL (ref 0–0.1)
BASOPHILS NFR BLD AUTO: 0 % (ref 0–1)
BILIRUB SERPL-MCNC: 0.82 MG/DL (ref 0.2–1)
BUN SERPL-MCNC: 15 MG/DL (ref 5–25)
BUN SERPL-MCNC: 16 MG/DL (ref 5–25)
CALCIUM SERPL-MCNC: 8.1 MG/DL (ref 8.4–10.2)
CALCIUM SERPL-MCNC: 8.8 MG/DL (ref 8.4–10.2)
CARDIAC TROPONIN I PNL SERPL HS: <2 NG/L
CARDIAC TROPONIN I PNL SERPL HS: <2 NG/L
CHLORIDE SERPL-SCNC: 100 MMOL/L (ref 96–108)
CHLORIDE SERPL-SCNC: 102 MMOL/L (ref 96–108)
CO2 SERPL-SCNC: 16 MMOL/L (ref 21–32)
CO2 SERPL-SCNC: 17 MMOL/L (ref 21–32)
CREAT SERPL-MCNC: 0.69 MG/DL (ref 0.6–1.3)
CREAT SERPL-MCNC: 0.77 MG/DL (ref 0.6–1.3)
EOSINOPHIL # BLD AUTO: 0.02 THOUSAND/ÂΜL (ref 0–0.61)
EOSINOPHIL NFR BLD AUTO: 0 % (ref 0–6)
ERYTHROCYTE [DISTWIDTH] IN BLOOD BY AUTOMATED COUNT: 11.9 % (ref 11.6–15.1)
GFR SERPL CREATININE-BSD FRML MDRD: 106 ML/MIN/1.73SQ M
GFR SERPL CREATININE-BSD FRML MDRD: 94 ML/MIN/1.73SQ M
GLUCOSE SERPL-MCNC: 313 MG/DL (ref 65–140)
GLUCOSE SERPL-MCNC: 413 MG/DL (ref 65–140)
GLUCOSE SERPL-MCNC: 422 MG/DL (ref 65–140)
GLUCOSE SERPL-MCNC: 517 MG/DL (ref 65–140)
HCG SERPL QL: NEGATIVE
HCO3 BLDV-SCNC: 14.9 MMOL/L (ref 24–30)
HCT VFR BLD AUTO: 41.8 % (ref 34.8–46.1)
HGB BLD-MCNC: 14.7 G/DL (ref 11.5–15.4)
IMM GRANULOCYTES # BLD AUTO: 0.08 THOUSAND/UL (ref 0–0.2)
IMM GRANULOCYTES NFR BLD AUTO: 1 % (ref 0–2)
LYMPHOCYTES # BLD AUTO: 0.69 THOUSANDS/ÂΜL (ref 0.6–4.47)
LYMPHOCYTES NFR BLD AUTO: 6 % (ref 14–44)
MCH RBC QN AUTO: 30.8 PG (ref 26.8–34.3)
MCHC RBC AUTO-ENTMCNC: 35.2 G/DL (ref 31.4–37.4)
MCV RBC AUTO: 88 FL (ref 82–98)
MONOCYTES # BLD AUTO: 0.98 THOUSAND/ÂΜL (ref 0.17–1.22)
MONOCYTES NFR BLD AUTO: 9 % (ref 4–12)
NEUTROPHILS # BLD AUTO: 9.08 THOUSANDS/ÂΜL (ref 1.85–7.62)
NEUTS SEG NFR BLD AUTO: 84 % (ref 43–75)
NRBC BLD AUTO-RTO: 0 /100 WBCS
O2 CT BLDV-SCNC: 14.1 ML/DL
PCO2 BLDV: 26.7 MM HG (ref 42–50)
PH BLDV: 7.36 [PH] (ref 7.3–7.4)
PLATELET # BLD AUTO: 232 THOUSANDS/UL (ref 149–390)
PMV BLD AUTO: 10.4 FL (ref 8.9–12.7)
PO2 BLDV: 39.7 MM HG (ref 35–45)
POTASSIUM SERPL-SCNC: 3.6 MMOL/L (ref 3.5–5.3)
POTASSIUM SERPL-SCNC: 4 MMOL/L (ref 3.5–5.3)
PROT SERPL-MCNC: 7.9 G/DL (ref 6.4–8.4)
RBC # BLD AUTO: 4.77 MILLION/UL (ref 3.81–5.12)
SODIUM SERPL-SCNC: 129 MMOL/L (ref 135–147)
SODIUM SERPL-SCNC: 130 MMOL/L (ref 135–147)
WBC # BLD AUTO: 10.89 THOUSAND/UL (ref 4.31–10.16)

## 2024-11-03 PROCEDURE — 80048 BASIC METABOLIC PNL TOTAL CA: CPT | Performed by: HOSPITALIST

## 2024-11-03 PROCEDURE — 82306 VITAMIN D 25 HYDROXY: CPT | Performed by: HOSPITALIST

## 2024-11-03 PROCEDURE — 96375 TX/PRO/DX INJ NEW DRUG ADDON: CPT

## 2024-11-03 PROCEDURE — 93005 ELECTROCARDIOGRAM TRACING: CPT

## 2024-11-03 PROCEDURE — 84439 ASSAY OF FREE THYROXINE: CPT | Performed by: HOSPITALIST

## 2024-11-03 PROCEDURE — 82948 REAGENT STRIP/BLOOD GLUCOSE: CPT

## 2024-11-03 PROCEDURE — 70496 CT ANGIOGRAPHY HEAD: CPT

## 2024-11-03 PROCEDURE — 80053 COMPREHEN METABOLIC PANEL: CPT | Performed by: STUDENT IN AN ORGANIZED HEALTH CARE EDUCATION/TRAINING PROGRAM

## 2024-11-03 PROCEDURE — 84443 ASSAY THYROID STIM HORMONE: CPT | Performed by: HOSPITALIST

## 2024-11-03 PROCEDURE — 83036 HEMOGLOBIN GLYCOSYLATED A1C: CPT | Performed by: HOSPITALIST

## 2024-11-03 PROCEDURE — 70498 CT ANGIOGRAPHY NECK: CPT

## 2024-11-03 PROCEDURE — 96365 THER/PROPH/DIAG IV INF INIT: CPT

## 2024-11-03 PROCEDURE — 85025 COMPLETE CBC W/AUTO DIFF WBC: CPT | Performed by: STUDENT IN AN ORGANIZED HEALTH CARE EDUCATION/TRAINING PROGRAM

## 2024-11-03 PROCEDURE — 80061 LIPID PANEL: CPT | Performed by: HOSPITALIST

## 2024-11-03 PROCEDURE — 84703 CHORIONIC GONADOTROPIN ASSAY: CPT | Performed by: STUDENT IN AN ORGANIZED HEALTH CARE EDUCATION/TRAINING PROGRAM

## 2024-11-03 PROCEDURE — 82010 KETONE BODYS QUAN: CPT | Performed by: STUDENT IN AN ORGANIZED HEALTH CARE EDUCATION/TRAINING PROGRAM

## 2024-11-03 PROCEDURE — 82607 VITAMIN B-12: CPT | Performed by: HOSPITALIST

## 2024-11-03 PROCEDURE — 99285 EMERGENCY DEPT VISIT HI MDM: CPT | Performed by: STUDENT IN AN ORGANIZED HEALTH CARE EDUCATION/TRAINING PROGRAM

## 2024-11-03 PROCEDURE — 82805 BLOOD GASES W/O2 SATURATION: CPT | Performed by: STUDENT IN AN ORGANIZED HEALTH CARE EDUCATION/TRAINING PROGRAM

## 2024-11-03 PROCEDURE — 99223 1ST HOSP IP/OBS HIGH 75: CPT | Performed by: HOSPITALIST

## 2024-11-03 PROCEDURE — 84484 ASSAY OF TROPONIN QUANT: CPT | Performed by: STUDENT IN AN ORGANIZED HEALTH CARE EDUCATION/TRAINING PROGRAM

## 2024-11-03 PROCEDURE — 36415 COLL VENOUS BLD VENIPUNCTURE: CPT | Performed by: STUDENT IN AN ORGANIZED HEALTH CARE EDUCATION/TRAINING PROGRAM

## 2024-11-03 PROCEDURE — 99284 EMERGENCY DEPT VISIT MOD MDM: CPT

## 2024-11-03 PROCEDURE — 84550 ASSAY OF BLOOD/URIC ACID: CPT | Performed by: HOSPITALIST

## 2024-11-03 RX ORDER — ENOXAPARIN SODIUM 100 MG/ML
40 INJECTION SUBCUTANEOUS DAILY
Status: DISCONTINUED | OUTPATIENT
Start: 2024-11-04 | End: 2024-11-08 | Stop reason: HOSPADM

## 2024-11-03 RX ORDER — SODIUM CHLORIDE 9 MG/ML
75 INJECTION, SOLUTION INTRAVENOUS CONTINUOUS
Status: DISCONTINUED | OUTPATIENT
Start: 2024-11-03 | End: 2024-11-04

## 2024-11-03 RX ORDER — KETOROLAC TROMETHAMINE 30 MG/ML
15 INJECTION, SOLUTION INTRAMUSCULAR; INTRAVENOUS ONCE
Status: COMPLETED | OUTPATIENT
Start: 2024-11-03 | End: 2024-11-03

## 2024-11-03 RX ORDER — ACETAMINOPHEN 325 MG/1
650 TABLET ORAL EVERY 6 HOURS PRN
Status: DISCONTINUED | OUTPATIENT
Start: 2024-11-03 | End: 2024-11-08 | Stop reason: HOSPADM

## 2024-11-03 RX ORDER — ACETAMINOPHEN 10 MG/ML
1000 INJECTION, SOLUTION INTRAVENOUS ONCE
Status: COMPLETED | OUTPATIENT
Start: 2024-11-03 | End: 2024-11-03

## 2024-11-03 RX ORDER — MAGNESIUM SULFATE HEPTAHYDRATE 40 MG/ML
2 INJECTION, SOLUTION INTRAVENOUS ONCE
Status: COMPLETED | OUTPATIENT
Start: 2024-11-03 | End: 2024-11-03

## 2024-11-03 RX ORDER — MECLIZINE HYDROCHLORIDE 25 MG/1
25 TABLET ORAL ONCE
Status: COMPLETED | OUTPATIENT
Start: 2024-11-03 | End: 2024-11-03

## 2024-11-03 RX ORDER — MECLIZINE HCL 12.5 MG 12.5 MG/1
12.5 TABLET ORAL EVERY 8 HOURS PRN
Status: DISCONTINUED | OUTPATIENT
Start: 2024-11-03 | End: 2024-11-05

## 2024-11-03 RX ORDER — ONDANSETRON 2 MG/ML
4 INJECTION INTRAMUSCULAR; INTRAVENOUS EVERY 6 HOURS PRN
Status: DISCONTINUED | OUTPATIENT
Start: 2024-11-03 | End: 2024-11-08 | Stop reason: HOSPADM

## 2024-11-03 RX ADMIN — MECLIZINE HYDROCHLORIDE 25 MG: 25 TABLET ORAL at 21:49

## 2024-11-03 RX ADMIN — SODIUM CHLORIDE 75 ML/HR: 0.9 INJECTION, SOLUTION INTRAVENOUS at 23:52

## 2024-11-03 RX ADMIN — ACETAMINOPHEN 1000 MG: 10 INJECTION INTRAVENOUS at 19:48

## 2024-11-03 RX ADMIN — MECLIZINE HYDROCHLORIDE 25 MG: 25 TABLET ORAL at 19:47

## 2024-11-03 RX ADMIN — SODIUM CHLORIDE 14 UNITS/HR: 9 INJECTION, SOLUTION INTRAVENOUS at 22:15

## 2024-11-03 RX ADMIN — SODIUM CHLORIDE 1000 ML: 0.9 INJECTION, SOLUTION INTRAVENOUS at 19:44

## 2024-11-03 RX ADMIN — IOHEXOL 85 ML: 350 INJECTION, SOLUTION INTRAVENOUS at 21:12

## 2024-11-03 RX ADMIN — KETOROLAC TROMETHAMINE 15 MG: 30 INJECTION, SOLUTION INTRAMUSCULAR at 19:46

## 2024-11-03 RX ADMIN — MAGNESIUM SULFATE HEPTAHYDRATE 2 G: 40 INJECTION, SOLUTION INTRAVENOUS at 20:17

## 2024-11-03 NOTE — Clinical Note
Case was discussed with  and the patient's admission status was agreed to be Admission Status: observation status to the service of Dr. Alvarez

## 2024-11-04 PROBLEM — R82.90 ABNORMAL URINALYSIS: Status: ACTIVE | Noted: 2024-11-04

## 2024-11-04 LAB
25(OH)D3 SERPL-MCNC: 22.6 NG/ML (ref 30–100)
4HR DELTA HS TROPONIN: >1 NG/L
ANION GAP SERPL CALCULATED.3IONS-SCNC: 7 MMOL/L (ref 4–13)
ANION GAP SERPL CALCULATED.3IONS-SCNC: 9 MMOL/L (ref 4–13)
BACTERIA UR QL AUTO: ABNORMAL /HPF
BILIRUB UR QL STRIP: NEGATIVE
BUN SERPL-MCNC: 12 MG/DL (ref 5–25)
BUN SERPL-MCNC: 14 MG/DL (ref 5–25)
CALCIUM SERPL-MCNC: 8.1 MG/DL (ref 8.4–10.2)
CALCIUM SERPL-MCNC: 8.2 MG/DL (ref 8.4–10.2)
CARDIAC TROPONIN I PNL SERPL HS: 3 NG/L
CHLORIDE SERPL-SCNC: 109 MMOL/L (ref 96–108)
CHLORIDE SERPL-SCNC: 110 MMOL/L (ref 96–108)
CHOLEST SERPL-MCNC: 261 MG/DL
CLARITY UR: ABNORMAL
CO2 SERPL-SCNC: 15 MMOL/L (ref 21–32)
CO2 SERPL-SCNC: 19 MMOL/L (ref 21–32)
COLOR UR: COLORLESS
CREAT SERPL-MCNC: 0.51 MG/DL (ref 0.6–1.3)
CREAT SERPL-MCNC: 0.58 MG/DL (ref 0.6–1.3)
ERYTHROCYTE [DISTWIDTH] IN BLOOD BY AUTOMATED COUNT: 11.9 % (ref 11.6–15.1)
EST. AVERAGE GLUCOSE BLD GHB EST-MCNC: 269 MG/DL
GFR SERPL CREATININE-BSD FRML MDRD: 113 ML/MIN/1.73SQ M
GFR SERPL CREATININE-BSD FRML MDRD: 118 ML/MIN/1.73SQ M
GLUCOSE P FAST SERPL-MCNC: 100 MG/DL (ref 65–99)
GLUCOSE SERPL-MCNC: 100 MG/DL (ref 65–140)
GLUCOSE SERPL-MCNC: 113 MG/DL (ref 65–140)
GLUCOSE SERPL-MCNC: 118 MG/DL (ref 65–140)
GLUCOSE SERPL-MCNC: 122 MG/DL (ref 65–140)
GLUCOSE SERPL-MCNC: 144 MG/DL (ref 65–140)
GLUCOSE SERPL-MCNC: 157 MG/DL (ref 65–140)
GLUCOSE SERPL-MCNC: 162 MG/DL (ref 65–140)
GLUCOSE SERPL-MCNC: 179 MG/DL (ref 65–140)
GLUCOSE SERPL-MCNC: 197 MG/DL (ref 65–140)
GLUCOSE SERPL-MCNC: 205 MG/DL (ref 65–140)
GLUCOSE SERPL-MCNC: 227 MG/DL (ref 65–140)
GLUCOSE SERPL-MCNC: 232 MG/DL (ref 65–140)
GLUCOSE SERPL-MCNC: 248 MG/DL (ref 65–140)
GLUCOSE SERPL-MCNC: 250 MG/DL (ref 65–140)
GLUCOSE SERPL-MCNC: 266 MG/DL (ref 65–140)
GLUCOSE UR STRIP-MCNC: ABNORMAL MG/DL
HBA1C MFR BLD: 11 %
HCT VFR BLD AUTO: 36.6 % (ref 34.8–46.1)
HDLC SERPL-MCNC: 38 MG/DL
HGB BLD-MCNC: 12.6 G/DL (ref 11.5–15.4)
HGB UR QL STRIP.AUTO: ABNORMAL
KETONES UR STRIP-MCNC: ABNORMAL MG/DL
LEUKOCYTE ESTERASE UR QL STRIP: ABNORMAL
MAGNESIUM SERPL-MCNC: 2.4 MG/DL (ref 1.9–2.7)
MCH RBC QN AUTO: 30.2 PG (ref 26.8–34.3)
MCHC RBC AUTO-ENTMCNC: 34.4 G/DL (ref 31.4–37.4)
MCV RBC AUTO: 88 FL (ref 82–98)
NITRITE UR QL STRIP: NEGATIVE
NON-SQ EPI CELLS URNS QL MICRO: ABNORMAL /HPF
NONHDLC SERPL-MCNC: 223 MG/DL
OSMOLALITY UR: 724 MMOL/KG
PH UR STRIP.AUTO: 6 [PH]
PHOSPHATE SERPL-MCNC: 2.9 MG/DL (ref 2.7–4.5)
PLATELET # BLD AUTO: 218 THOUSANDS/UL (ref 149–390)
PMV BLD AUTO: 9.7 FL (ref 8.9–12.7)
POTASSIUM SERPL-SCNC: 3.3 MMOL/L (ref 3.5–5.3)
POTASSIUM SERPL-SCNC: 3.7 MMOL/L (ref 3.5–5.3)
PROT UR STRIP-MCNC: ABNORMAL MG/DL
RBC # BLD AUTO: 4.17 MILLION/UL (ref 3.81–5.12)
RBC #/AREA URNS AUTO: ABNORMAL /HPF
SODIUM 24H UR-SCNC: 58 MOL/L
SODIUM SERPL-SCNC: 133 MMOL/L (ref 135–147)
SODIUM SERPL-SCNC: 136 MMOL/L (ref 135–147)
SP GR UR STRIP.AUTO: 1.01 (ref 1–1.03)
T4 FREE SERPL-MCNC: 0.74 NG/DL (ref 0.61–1.12)
TRIGL SERPL-MCNC: 471 MG/DL
TSH SERPL DL<=0.05 MIU/L-ACNC: 12.09 UIU/ML (ref 0.45–4.5)
URATE SERPL-MCNC: 4.6 MG/DL (ref 2–7.5)
UROBILINOGEN UR STRIP-ACNC: <2 MG/DL
VIT B12 SERPL-MCNC: 1018 PG/ML (ref 180–914)
WBC # BLD AUTO: 9.53 THOUSAND/UL (ref 4.31–10.16)
WBC #/AREA URNS AUTO: ABNORMAL /HPF

## 2024-11-04 PROCEDURE — 80048 BASIC METABOLIC PNL TOTAL CA: CPT | Performed by: HOSPITALIST

## 2024-11-04 PROCEDURE — 99244 OFF/OP CNSLTJ NEW/EST MOD 40: CPT | Performed by: INTERNAL MEDICINE

## 2024-11-04 PROCEDURE — 97110 THERAPEUTIC EXERCISES: CPT

## 2024-11-04 PROCEDURE — 84100 ASSAY OF PHOSPHORUS: CPT | Performed by: HOSPITALIST

## 2024-11-04 PROCEDURE — 84484 ASSAY OF TROPONIN QUANT: CPT | Performed by: STUDENT IN AN ORGANIZED HEALTH CARE EDUCATION/TRAINING PROGRAM

## 2024-11-04 PROCEDURE — 97163 PT EVAL HIGH COMPLEX 45 MIN: CPT

## 2024-11-04 PROCEDURE — 83935 ASSAY OF URINE OSMOLALITY: CPT | Performed by: HOSPITALIST

## 2024-11-04 PROCEDURE — 83735 ASSAY OF MAGNESIUM: CPT | Performed by: HOSPITALIST

## 2024-11-04 PROCEDURE — 84300 ASSAY OF URINE SODIUM: CPT | Performed by: HOSPITALIST

## 2024-11-04 PROCEDURE — 86341 ISLET CELL ANTIBODY: CPT | Performed by: INTERNAL MEDICINE

## 2024-11-04 PROCEDURE — 81001 URINALYSIS AUTO W/SCOPE: CPT | Performed by: HOSPITALIST

## 2024-11-04 PROCEDURE — 85027 COMPLETE CBC AUTOMATED: CPT | Performed by: HOSPITALIST

## 2024-11-04 PROCEDURE — 87086 URINE CULTURE/COLONY COUNT: CPT | Performed by: HOSPITALIST

## 2024-11-04 PROCEDURE — 83519 RIA NONANTIBODY: CPT | Performed by: INTERNAL MEDICINE

## 2024-11-04 PROCEDURE — 99232 SBSQ HOSP IP/OBS MODERATE 35: CPT

## 2024-11-04 PROCEDURE — 82948 REAGENT STRIP/BLOOD GLUCOSE: CPT

## 2024-11-04 RX ORDER — SODIUM CHLORIDE 450 MG/100ML
75 INJECTION, SOLUTION INTRAVENOUS CONTINUOUS
Status: DISCONTINUED | OUTPATIENT
Start: 2024-11-04 | End: 2024-11-04

## 2024-11-04 RX ORDER — POTASSIUM CHLORIDE 14.9 MG/ML
20 INJECTION INTRAVENOUS ONCE
Status: COMPLETED | OUTPATIENT
Start: 2024-11-04 | End: 2024-11-04

## 2024-11-04 RX ORDER — LEVOTHYROXINE SODIUM 25 UG/1
25 TABLET ORAL
Status: DISCONTINUED | OUTPATIENT
Start: 2024-11-05 | End: 2024-11-08 | Stop reason: HOSPADM

## 2024-11-04 RX ORDER — CEFTRIAXONE 1 G/50ML
1000 INJECTION, SOLUTION INTRAVENOUS EVERY 24 HOURS
Status: DISCONTINUED | OUTPATIENT
Start: 2024-11-04 | End: 2024-11-04

## 2024-11-04 RX ADMIN — ENOXAPARIN SODIUM 40 MG: 40 INJECTION SUBCUTANEOUS at 09:21

## 2024-11-04 RX ADMIN — POTASSIUM CHLORIDE 20 MEQ: 14.9 INJECTION, SOLUTION INTRAVENOUS at 02:38

## 2024-11-04 RX ADMIN — SODIUM CHLORIDE 0.5 UNITS/HR: 9 INJECTION, SOLUTION INTRAVENOUS at 06:15

## 2024-11-04 RX ADMIN — SODIUM CHLORIDE 75 ML/HR: 0.45 INJECTION, SOLUTION INTRAVENOUS at 02:44

## 2024-11-04 RX ADMIN — CEFTRIAXONE 1000 MG: 1 INJECTION, SOLUTION INTRAVENOUS at 06:18

## 2024-11-04 RX ADMIN — SODIUM CHLORIDE 4 UNITS/HR: 9 INJECTION, SOLUTION INTRAVENOUS at 17:43

## 2024-11-04 NOTE — ASSESSMENT & PLAN NOTE
By history symptoms suggestive of peripheral vertigo with tinnitus of the left ear decreased hearing and recent viral illness.  Underwent CT imaging in ER with findings as below    CT angio head and neck   1.  No acute intracranial or angiographic abnormality  2.  Sinus disease    CT head noncontrast  Parenchyma:  No intracranial mass, mass effect or midline shift. No CT signs of acute infarction.  No acute parenchymal hemorrhage.  Ventricles and extra-axial spaces:  Normal for the patient's age.  Visualized orbits and paranasal sinuses: Left maxillary sinus opacification. Ethmoid air cell and right maxillary sinus mucosal thickening. Debris in the left sphenoid sinus.    -Symptomatic treatment with meclizine as needed Zofran as needed IV fluids n.p.o. except ice chips  -ENT is asked for consult as able; was supposed to follow-up as outpatient//second visit for same issue.  -OT consulted
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
## 2024-11-04 NOTE — H&P
H&P - Hospitalist   Name: Gabby Olivera 44 y.o. female I MRN: [id number]  Unit/Bed#: 2 [place] [address] A I Date of Admission: 11/3/2024   Date of Service: 11/3/2024 I Hospital Day: 0     Assessment & Plan  Uncontrolled diabetes mellitus with hyperglycemia (HCC)  Lab Results   Component Value Date    HGBA1C 11.5 (H) 01/26/2024       Recent Labs     11/03/24  2122 11/03/24  2328   POCGLU 413* 313*     -Patient presents with uncontrolled diabetes with hyperglycemia.  BMP shows C02 of 17 gap of 13 beta hydroxy butyrate 1.91.  VBG pH 7.364.  -Non-DKA insulin drip with fingersticks every 2 hours(most frequent as allowable per hospital policy)  -BMP magnesium and phosphorus every 4 hours  -Repeat A1c.  Also check TSH B12 lipid panel and vitamin D levels.  -IV fluids.  -Endocrine consult  -Would likely benefit from diabetic teaching    Blood Sugar Average: Last 72 hrs:  (P) 363    Acidosis  As outlined above.  Tinnitus of left ear  As outlined below  Dizziness  By history symptoms suggestive of peripheral vertigo with tinnitus of the left ear decreased hearing and recent viral illness.  Underwent CT imaging in ER with findings as below    CT angio head and neck   1.  No acute intracranial or angiographic abnormality  2.  Sinus disease    CT head noncontrast  Parenchyma:  No intracranial mass, mass effect or midline shift. No CT signs of acute infarction.  No acute parenchymal hemorrhage.  Ventricles and extra-axial spaces:  Normal for the patient's age.  Visualized orbits and paranasal sinuses: Left maxillary sinus opacification. Ethmoid air cell and right maxillary sinus mucosal thickening. Debris in the left sphenoid sinus.    -Symptomatic treatment with meclizine as needed Zofran as needed IV fluids n.p.o. except ice chips  -ENT is asked for consult as able; was supposed to follow-up as outpatient//second visit for same issue.  -OT consulted  Hyponatremia  -Likely pseudohyponatremia due to hyperglycemia  -Follow  trend  Hypothyroid  -Has not been taking Synthroid recheck TSH prior to resumption if needed  Non compliance w medication regimen  -Patient is unfunded and likely contributing to noncompliance with medication regimen      VTE Pharmacologic Prophylaxis:   Moderate Risk (Score 3-4) - Pharmacological DVT Prophylaxis Ordered: enoxaparin (Lovenox).  Code Status: Level 1 - Full Code   Discussion with family: Updated  (sister) at bedside.    Anticipated Length of Stay: Patient will be admitted on an inpatient basis with an anticipated length of stay of greater than 2 midnights secondary to uncontrolled diabetes with hyperglycemia.    History of Present Illness   Chief Complaint: Dizziness left ear tinnitus    Gabby Olivera is a 44 y.o. female with a PMH of poorly controlled diabetes on oral hypoglycemics, hypothyroidism noncompliance with medications who presents with dizziness.  Patient is Telugu-speaking sister present at the bedside helped with translation patient reports that over the last week has decreasing hearing out of the left ear.  It is associated with what she describes as a noise.  Today she developed dizziness.  The dizziness is worse when she moves her head and positions when she tries to stand.  She was unable to ambulate at home due to significant dizziness which is also associated with nausea vomiting patient presented to the ER.  Of note patient was in the emergency room 10/27/2024 with flulike symptoms.  She then returned to the ER 10/31/2024 with left earache with decreased hearing and pressure.  She was referred to outpatient ENT.  She now returns to the hospital with the above symptoms.  She denies any focal weakness visual changes.  No numbness tingling.  No alcohol no smoking.  She was found to be severely hyperglycemic with a glucose of 517 sodium of 130 bicarb is 17 gap of 13 VBG shows pH of 7.36 CO2 26.7 bicarb 14.9 beta hydroxybutyrate 1.91 and is referred to the  "hospitalist service.  Reports that she has not taken her metformin because she reports \" does not help\".   She underwent CT head CT angio head and neck in the ER which were negative.  She was treated symptomatically in the ER with 1 g of Tylenol and 15 mg of Toradol 2 g of magnesium 25 of Antivert she was initiated on non-DKA insulin drip and given IV fluids.  She is now referred to the hospitalist service.    Review of Systems   Reason unable to perform ROS: Bengali speaking.       Historical Information   Past Medical History:   Diagnosis Date    Diabetes mellitus (HCC)     Disease of thyroid gland      Past Surgical History:   Procedure Laterality Date     SECTION       Social History     Tobacco Use    Smoking status: Never    Smokeless tobacco: Never   Vaping Use    Vaping status: Never Used   Substance and Sexual Activity    Alcohol use: Yes     Comment: socially    Drug use: Never    Sexual activity: Yes     Birth control/protection: Injection     E-Cigarette/Vaping    E-Cigarette Use Never User      E-Cigarette/Vaping Substances    Nicotine No     THC No     CBD No     Flavoring No     Other No     Unknown No        Social History:  Marital Status:    Occupation: Not working currently per sister  Patient Pre-hospital Living Situation: Home  Patient Pre-hospital Level of Mobility: walks  Patient Pre-hospital Diet Restrictions:     Meds/Allergies   I have reviewed home medications with patient personally.  Prior to Admission medications    Medication Sig Start Date End Date Taking? Authorizing Provider   medroxyPROGESTERone (DEPO-PROVERA) 150 mg/mL injection Inject 1 mL (150 mg total) into a muscle every 3 (three) months 23  Yes SHARI Lopez   fluticasone (FLONASE) 50 mcg/act nasal spray 1 spray into each nostril daily  Patient not taking: Reported on 11/3/2024 10/31/24   Jerel Tate MD   levothyroxine (Levoxyl) 50 mcg tablet Take 1 tablet (50 mcg total) by mouth daily " 4/24/24 5/24/24  Jose Litvak, DO   levothyroxine (Synthroid) 50 mcg tablet Take 1 tablet (50 mcg total) by mouth daily 1/26/24   Anjelica Carcamo, DO   levothyroxine (Synthroid) 50 mcg tablet Take 1 tablet (50 mcg total) by mouth daily 4/24/24 5/24/24  Jose Litvak, DO   metFORMIN (GLUCOPHAGE) 500 mg tablet Take 1 tablet (500 mg total) by mouth 2 (two) times a day with meals 1/4/24   Moris Serrano MD   metFORMIN (GLUCOPHAGE) 500 mg tablet Take 1 tablet (500 mg total) by mouth 2 (two) times a day with meals 1/4/24 2/3/24  Moris Serrano MD   metFORMIN (GLUCOPHAGE) 500 mg tablet Take 1 tablet (500 mg total) by mouth 2 (two) times a day with meals 4/24/24 5/24/24  Jose Troy, DO   metFORMIN (GLUCOPHAGE) 500 mg tablet Take 1 tablet (500 mg total) by mouth 2 (two) times a day with meals 4/24/24 5/24/24  Jose Troy, DO     Allergies   Allergen Reactions    Macrodantin [Nitrofurantoin] Nausea Only    Nitrofurantoin Vomiting       Objective :  Temp:  [97.4 °F (36.3 °C)-98.7 °F (37.1 °C)] 97.4 °F (36.3 °C)  HR:  [] 101  BP: (127-149)/(71-86) 127/86  Resp:  [16-20] 16  SpO2:  [97 %-100 %] 97 %  O2 Device: None (Room air)    Physical Exam  Constitutional:       Appearance: Normal appearance.   HENT:      Head: Normocephalic and atraumatic.      Ears:      Comments: Left ear earwax mild erythema in canal no drainage was uncomfortable/unable to visualize TM.      Mouth/Throat:      Mouth: Mucous membranes are dry.   Eyes:      Extraocular Movements: Extraocular movements intact.      Pupils: Pupils are equal, round, and reactive to light.      Comments: ? nystagmus with eye movements to the right; appeared uncomfortable   Cardiovascular:      Rate and Rhythm: Normal rate and regular rhythm.   Pulmonary:      Effort: Pulmonary effort is normal.      Breath sounds: Normal breath sounds.   Abdominal:      General: Abdomen is flat.      Palpations: Abdomen is soft.   Musculoskeletal:         General: Normal range of  motion.      Cervical back: Normal range of motion and neck supple.   Skin:     General: Skin is warm and dry.   Neurological:      General: No focal deficit present.      Mental Status: She is alert.   Psychiatric:         Mood and Affect: Mood normal.         Behavior: Behavior normal.          Lines/Drains:            Lab Results: I have reviewed the following results:  Results from last 7 days   Lab Units 11/03/24 1944   WBC Thousand/uL 10.89*   HEMOGLOBIN g/dL 14.7   HEMATOCRIT % 41.8   PLATELETS Thousands/uL 232   SEGS PCT % 84*   LYMPHO PCT % 6*   MONO PCT % 9   EOS PCT % 0     Results from last 7 days   Lab Units 11/03/24 2207 11/03/24 1944   SODIUM mmol/L 129* 130*   POTASSIUM mmol/L 4.0 3.6   CHLORIDE mmol/L 102 100   CO2 mmol/L 16* 17*   BUN mg/dL 15 16   CREATININE mg/dL 0.69 0.77   ANION GAP mmol/L 11 13   CALCIUM mg/dL 8.1* 8.8   ALBUMIN g/dL  --  4.4   TOTAL BILIRUBIN mg/dL  --  0.82   ALK PHOS U/L  --  70   ALT U/L  --  9   AST U/L  --  8*   GLUCOSE RANDOM mg/dL 422* 517*         Results from last 7 days   Lab Units 11/03/24 2328 11/03/24 2122   POC GLUCOSE mg/dl 313* 413*     Lab Results   Component Value Date    HGBA1C 11.5 (H) 01/26/2024    HGBA1C 10.6 (H) 08/18/2022                 Administrative Statements       ** Please Note: This note has been constructed using a voice recognition system. **

## 2024-11-04 NOTE — ASSESSMENT & PLAN NOTE
Lab Results   Component Value Date    HGBA1C 11.5 (H) 01/26/2024       Recent Labs     11/03/24 2122 11/03/24  2328   POCGLU 413* 313*     -Patient presents with uncontrolled diabetes with hyperglycemia.  BMP shows C02 of 17 gap of 13 beta hydroxy butyrate 1.91.  VBG pH 7.364.  -Non-DKA insulin drip with fingersticks every 2 hours(most frequent as allowable per hospital policy)  -BMP magnesium and phosphorus every 4 hours  -Repeat A1c.  Also check TSH B12 lipid panel and vitamin D levels.  -IV fluids.  -Endocrine consult  -Would likely benefit from diabetic teaching    Blood Sugar Average: Last 72 hrs:  (P) 363

## 2024-11-04 NOTE — PHYSICAL THERAPY NOTE
PHYSICAL THERAPY EVALUATION/TREATMENT     11/04/24 1525   PT Last Visit   PT Visit Date 11/04/24   Note Type   Note type Evaluation   Pain Assessment   Pain Assessment Tool 0-10   Pain Score No Pain   Restrictions/Precautions   Other Precautions Chair Alarm;Bed Alarm;Fall Risk  (Vertigo/dizziness)   Home Living   Type of Home House   Home Layout Two level   Home Equipment   (None)   Additional Comments Patient independent without assistive device prior to admission   Prior Function   Level of [place] Independent with ADLs;Independent with functional mobility;Independent with IADLS   Lives With Alone   Receives Help From Family   Comments Patient states family assists at times   General   Additional Pertinent History Chart reviewed, patient admitted with uncontrolled diabetes mellitus.  Patient also with new onset of left ear hearing loss and dizziness.  Patient now presents with consistent dizziness for all mobility, transfers and vestibular testing   Family/Caregiver Present No   Cognition   Overall Cognitive Status WFL   Arousal/Participation Cooperative   Orientation Level Oriented X4   Following Commands Follows multistep commands with increased time or repetition   Comments Patient slow to follow commands at times with dizziness/vertigo, eyes closed with deep breathing due to nausea   Subjective   Subjective Patient states having dizziness at all times slightly improved with semiupright positioning in bed supine   RUE Assessment   RUE Assessment WFL   LUE Assessment   LUE Assessment WFL   RLE Assessment   RLE Assessment   (Range of motion within functional limits, strength 4 -/5)   LLE Assessment   LLE Assessment   (Range of motion within functional limits, strength 4 -/5)   Vestibular   Spontaneous Nystagmus (-) no evidence of nystagmus at rest in room light;(-) no evidence of nystagmus at rest fixation blocked   Gaze Holding Nystagmus (-) no evidence of nystagmus   Vestibular Comments Patient with  dizziness with all transfers supine to and from sit, dizziness with flat supine positioning as well as dizziness with upright posturing and gait activity despite gaze fixation education.  Side-lying vestibular testing noted for increased dizziness on the left side with hearing loss on the left side as well   Coordination   Movements are Fluid and Coordinated 0   Coordination and Movement Description Decreased coordination and balance with vertigo, dizziness   Bed Mobility   Supine to Sit 5  Supervision   Sit to Supine 5  Supervision   Additional Comments Slow movement with guarded head motions due to dizziness and eyes closed at times   Transfers   Sit to Stand 4  Minimal assistance   Additional items Assist x 1   Stand to Sit 5  Supervision   Additional items Assist x 1   Ambulation/Elevation   Gait Assistance 3  Moderate assist   Additional items Assist x 1;Verbal cues;Tactile cues   Assistive Device   (Handhold assist)   Distance 15 feet with unsteady gait patterning eyes closing at times due to dizziness   Balance   Static Sitting Fair   Dynamic Sitting Fair -   Static Standing Fair -   Dynamic Standing Fair -   Ambulatory Poor +   Activity Tolerance   Activity Tolerance Patient limited by fatigue;Treatment limited secondary to medical complications (Comment)  (Limited by dizziness and nausea)   Nurse Made Aware Yes   Assessment   Prognosis Good   Problem List Impaired balance;Decreased mobility;Decreased coordination  (Dizziness/nausea)   Assessment Patient seen for Physical Therapy evaluation. Patient admitted with Uncontrolled diabetes mellitus with hyperglycemia (HCC).  Comorbidities affecting patient's physical performance include: Uncontrolled diabetes, vertigo hearing loss.  Personal factors affecting patient at time of initial evaluation include: lives in two story house, inability to ambulate household distances, inability to navigate community distances, inability to navigate level surfaces without  external assistance, inability to perform dynamic tasks in community, limited home support, inability to perform physical activity, inability to perform ADLS, and inability to perform IADLS . Prior to admission, patient was independent with functional mobility without assistive device, independent with ADLS, independent with IADLS, living in a multi-level home, ambulating household distance, ambulating community distances, and home with family assist.  Please find objective findings from Physical Therapy assessment regarding body systems outlined above with impairments and limitations including impaired balance, decreased endurance, impaired coordination, gait deviations, decreased activity tolerance, decreased functional mobility tolerance, and fall risk.  The Barthel Index was used as a functional outcome tool presenting with a score of Barthel Index Score: 55 today indicating marked limitations of functional mobility and ADLS.  Patient's clinical presentation is currently unstable/unpredictable as seen in patient's presentation of vital sign response, changing level of pain, increased fall risk, new onset of impairment of functional mobility, decreased endurance, and new onset of weakness. Pt would benefit from continued Physical Therapy treatment to address deficits as defined above and maximize level of functional mobility. As demonstrated by objective findings, the assigned level of complexity for this evaluation is high.The patient's -[facility] Basic Mobility Inpatient Short Form Raw Score is 17. A Raw score of greater than 16 suggests the patient may benefit from discharge to home. Please also refer to the recommendation of the Physical Therapist for safe discharge planning.   Goals   Patient Goals To feel better   STG Expiration Date 11/11/24   Short Term Goal #1 Transfers and gait with least restrictive assistive device independently   Short Term Goal #2 Gait endurance to functional household distances   LTG  Expiration Date 11/18/24   Long Term Goal #1 Decrease his vertigo, dizziness by 75%   Long Term Goal #2 Independent vestibular exercise as tolerated, independent transfers and gait without assistive device for functional community distances   Plan   Treatment/Interventions Functional transfer training;LE strengthening/ROM;Elevations;Therapeutic exercise;Endurance training;Patient/family training;Equipment eval/education;Gait training;Compensatory technique education   PT Frequency 4-6x/wk   Discharge Recommendation   Rehab Resource Intensity Level, PT III (Minimum Resource Intensity)   AM-PAC Basic Mobility Inpatient   Turning in Flat Bed Without Bedrails 4   Lying on Back to Sitting on Edge of Flat Bed Without Bedrails 4   Moving Bed to Chair 3   Standing Up From Chair Using Arms 3   Walk in Room 2   Climb 3-5 Stairs With Railing 1   Basic Mobility Inpatient Raw Score 17   Basic Mobility Standardized Score 39.67   [facility] Highest Level Of Mobility   -[facility] Goal 5: Stand one or more mins   -[facility] Achieved 6: Walk 10 steps or more   Barthel Index   Feeding 10   Bathing 0   Grooming Score 0   Dressing Score 5   Bladder Score 10   Bowels Score 10   Toilet Use Score 5   Transfers (Bed/Chair) Score 10   Mobility (Level Surface) Score 0   Stairs Score 5   Barthel Index Score 55   Additional Treatment Session   Start Time 1510   End Time 1525   Treatment Assessment s: Patient with dizziness/nausea at all times with mobility, transfers and vestibular testing O: Education for gaze fixation completed.  No nystagmus noted on testing although increased dizziness to the left side.  Bilateral lower extremity exercise completed as listed below A: Patient will benefit from continued physical therapy with progression as tolerated and further assessment of vestibular needs as tolerated   Exercises   Hip Flexion Sitting;10 reps;Bilateral  (Alternating)   Hip Abduction Sitting;10 reps;Bilateral  (Alternating)   Knee AROM Long Arc  Quad Sitting;10 reps;Bilateral  (Alternating)   Licensure   [place] License Number  Amy Garcia PT 4 0 [id number]

## 2024-11-04 NOTE — ASSESSMENT & PLAN NOTE
"Patient presented to ED with dizziness starting day of admission that is worse with head turning and position changes associated with nausea, vomiting, and difficulty ambulating. Patient reports headache as well. She also reports hearing loss and a \"noise\" in her left ear over the past week.  Patient seen in [place] ED 10/27 for viral syndrome and again on 10/31 for decreased hearing and tinnitus in left ear and was discharged home with referral to ENT.  CTA head/neck negative for acute intracranial abnormality, did show sinus disease  Suspect peripheral vertigo given tinnitus, decreased hearing and recent viral illness  Continue meclizine and zofran prn  Check orthostatic vitals  ENT was consulted  PT/OT  "

## 2024-11-04 NOTE — ASSESSMENT & PLAN NOTE
"Lab Results   Component Value Date    HGBA1C 11.0 (H) 11/03/2024       Recent Labs     11/04/24  0906 11/04/24  1058 11/04/24  1255 11/04/24  1500   POCGLU 248* 144* 197* 232*     Blood Sugar Average: Last 72 hrs:  (P) 220.6  Noted with sugars in 400s-500s on admission, low bicarb, normal AG, mildly elevated beta-hydroxybutyrate  On metformin 500 mg bid, reports she has not been taking it because it \"doesn't help\"  Started on non DKA insulin gtt with improvement  Endocrinology consulted  Continue insulin gtt for now with plan to transition to subcutaneous basal bolus insulin possibly tomorrow  Diabetic diet  "
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
## 2024-11-04 NOTE — ASSESSMENT & PLAN NOTE
Received fax from pharmacy requesting refill(s) for     gabapentin (NEURONTIN) 600 MG tablet    Date last filled 08/17/2024    Last Appt Date:08/19/2024    Next Appt scheduled: 10/15/2024    Pharmacy:      [id number] - [place], [place] - [address] [facility] [address]      Will route for processing    EVELYN Castro [facility] [place]     UA positive for leukocytes, WBC  Patient asymptomatic  No fever or leukocytosis  Urine culture pending  Received dose of rocephin, hold further abx for now

## 2024-11-04 NOTE — PROGRESS NOTES
"Progress Note - Hospitalist   Name: Gabby Olivera 44 y.o. female I MRN: [id number]  Unit/Bed#: 2 [place] [address] A I Date of Admission: 11/3/2024   Date of Service: 11/4/2024 I Hospital Day: 0    Assessment & Plan  Uncontrolled diabetes mellitus with hyperglycemia (HCC)  Lab Results   Component Value Date    HGBA1C 11.0 (H) 11/03/2024       Recent Labs     11/04/24  0906 11/04/24  1058 11/04/24  1255 11/04/24  1500   POCGLU 248* 144* 197* 232*     Blood Sugar Average: Last 72 hrs:  (P) 220.6  Noted with sugars in 400s-500s on admission, low bicarb, normal AG, mildly elevated beta-hydroxybutyrate  On metformin 500 mg bid, reports she has not been taking it because it \"doesn't help\"  Started on non DKA insulin gtt with improvement  Endocrinology consulted  Continue insulin gtt for now with plan to transition to subcutaneous basal bolus insulin possibly tomorrow  Diabetic diet  Dizziness  Patient presented to ED with dizziness starting day of admission that is worse with head turning and position changes associated with nausea, vomiting, and difficulty ambulating. Patient reports headache as well. She also reports hearing loss and a \"noise\" in her left ear over the past week.  Patient seen in [facility] ED 10/27 for viral syndrome and again on 10/31 for decreased hearing and tinnitus in left ear and was discharged home with referral to ENT.  CTA head/neck negative for acute intracranial abnormality, did show sinus disease  Suspect peripheral vertigo given tinnitus, decreased hearing and recent viral illness  Continue meclizine and zofran prn  Check orthostatic vitals  ENT was consulted  PT/OT  Hypothyroid  Previously on levothyroxine, has not been taking  TSH elevated, T4 within normal limits  Endocrinology following  Restarted on levothyroxine 25 mcg daily  Will need repeat labs in 6-8 weeks  Follow up with PCP/endocrinology outpatient  Hyponatremia  Likely pseudohyponatremia due to hyperglycemia  Daily bmp  Abnormal " urinalysis  UA positive for leukocytes, WBC  Patient asymptomatic  No fever or leukocytosis  Urine culture pending  Received dose of rocephin, hold further abx for now  VTE Pharmacologic Prophylaxis:   Moderate Risk (Score 3-4) - Pharmacological DVT Prophylaxis Ordered: enoxaparin (Lovenox).    Mobility:   Basic Mobility Inpatient Raw Score: 19  JH-HLM Goal: 6: Walk 10 steps or more  JH-HLM Achieved: 3: Sit at edge of bed (limited due to dizziness)  JH-HLM Goal NOT achieved. Continue with multidisciplinary rounding and encourage appropriate mobility to improve upon JH-HLM goals.    Patient Centered Rounds: I performed bedside rounds with nursing staff today.   Discussions with Specialists or Other Care Team Provider: rn, endocrinology    Education and Discussions with Family / Patient: Patient declined call to .     Current Length of Stay: 0 day(s)  Current Patient Status: Observation   Certification Statement: The patient will continue to require additional inpatient hospital stay due to hyperglycemia, dizziness  Discharge Plan: Anticipate discharge in 48-72 hrs to home.    Code Status: Level 1 - Full Code    Subjective   Patient seen and examined at bedside using Urdu interpretor #[id number]. Patient reports feeling better today and says she did not wake up with dizziness. Still reports intermittent hearing loss and ringing in her ears. Denies nausea, vomiting, or abdominal pain and asking for regular diet. She is requesting to be seen by ENT provider because her dizziness and tinnitus made it very difficulty for her to walk around at home.    Objective :  Temp:  [97.4 °F (36.3 °C)-98.7 °F (37.1 °C)] 97.5 °F (36.4 °C)  HR:  [] 92  BP: (115-149)/(65-86) 120/69  Resp:  [16-20] 16  SpO2:  [96 %-100 %] 96 %  O2 Device: None (Room air)    Body mass index is 23.59 kg/m².     Input and Output Summary (last 24 hours):     Intake/Output Summary (Last 24 hours) at 11/4/2024 5860  Last data filed at  11/4/2024 0350  Gross per 24 hour   Intake 50 ml   Output 450 ml   Net -400 ml       Physical Exam  Vitals and nursing note reviewed.   Constitutional:       General: She is not in acute distress.     Appearance: She is well-developed.   HENT:      Left Ear: Decreased hearing noted.   Cardiovascular:      Rate and Rhythm: Normal rate and regular rhythm.      Heart sounds: No murmur heard.  Pulmonary:      Effort: Pulmonary effort is normal. No respiratory distress.      Breath sounds: Normal breath sounds.   Abdominal:      Tenderness: There is no abdominal tenderness.   Musculoskeletal:         General: No swelling.   Skin:     General: Skin is warm and dry.      Capillary Refill: Capillary refill takes less than 2 seconds.   Neurological:      General: No focal deficit present.      Mental Status: She is alert and oriented to person, place, and time.   Psychiatric:         Mood and Affect: Mood normal.     Lines/Drains:        Lab Results: I have reviewed the following results:   Results from last 7 days   Lab Units 11/04/24  0627 11/03/24  1944   WBC Thousand/uL 9.53 10.89*   HEMOGLOBIN g/dL 12.6 14.7   HEMATOCRIT % 36.6 41.8   PLATELETS Thousands/uL 218 232   SEGS PCT %  --  84*   LYMPHO PCT %  --  6*   MONO PCT %  --  9   EOS PCT %  --  0     Results from last 7 days   Lab Units 11/04/24  0627 11/03/24  2207 11/03/24  1944   SODIUM mmol/L 136   < > 130*   POTASSIUM mmol/L 3.7   < > 3.6   CHLORIDE mmol/L 110*   < > 100   CO2 mmol/L 19*   < > 17*   BUN mg/dL 12   < > 16   CREATININE mg/dL 0.51*   < > 0.77   ANION GAP mmol/L 7   < > 13   CALCIUM mg/dL 8.1*   < > 8.8   ALBUMIN g/dL  --   --  4.4   TOTAL BILIRUBIN mg/dL  --   --  0.82   ALK PHOS U/L  --   --  70   ALT U/L  --   --  9   AST U/L  --   --  8*   GLUCOSE RANDOM mg/dL 100   < > 517*    < > = values in this interval not displayed.         Results from last 7 days   Lab Units 11/04/24  1500 11/04/24  1255 11/04/24  1058 11/04/24  0906 11/04/24  0713  11/04/24  0453 11/04/24  0231 11/04/24  0014 11/03/24  2328 11/03/24  2122   POC GLUCOSE mg/dl 232* 197* 144* 248* 118 113 162* 266* 313* 413*     Results from last 7 days   Lab Units 11/03/24  1944   HEMOGLOBIN A1C % 11.0*           Recent Cultures (last 7 days):         Imaging Results Review: I reviewed radiology reports from this admission including: CT head.  Other Study Results Review: No additional pertinent studies reviewed.    Last 24 Hours Medication List:     Current Facility-Administered Medications:     acetaminophen (TYLENOL) tablet 650 mg, Q6H PRN    enoxaparin (LOVENOX) subcutaneous injection 40 mg, Daily    influenza vaccine (PF) (Fluarix) IM injection 0.5 mL, Prior to discharge    insulin regular (HumuLIN R,NovoLIN R) 1 Units/mL in sodium chloride 0.9 % 100 mL infusion, Titrated, Last Rate: 5 Units/hr (11/04/24 1504)    [START ON 11/5/2024] levothyroxine tablet 25 mcg, Early Morning    meclizine (ANTIVERT) tablet 12.5 mg, Q8H PRN    ondansetron (ZOFRAN) injection 4 mg, Q6H PRN    pneumococcal 20-selena conj vacc (PREVNAR 20) IM Injection 0.5 mL, Prior to discharge    sodium chloride 0.9 % bolus 1,000 mL, Once    Administrative Statements   Today, Patient Was Seen By: Florence Bourne PA-C    **Please Note: This note may have been constructed using a voice recognition system.**

## 2024-11-04 NOTE — PLAN OF CARE
Problem: METABOLIC, FLUID AND ELECTROLYTES - ADULT  Goal: Glucose maintained within target range  Description: INTERVENTIONS:  - Monitor Blood Glucose as ordered  - Assess for signs and symptoms of hyperglycemia and hypoglycemia  - Administer ordered medications to maintain glucose within target range  - Assess nutritional intake and initiate nutrition service referral as needed  Outcome: Progressing     Problem: GASTROINTESTINAL - ADULT  Goal: Minimal or absence of nausea and/or vomiting  Description: INTERVENTIONS:  - Administer IV fluids if ordered to ensure adequate hydration  - Maintain NPO status until nausea and vomiting are resolved  - Nasogastric tube if ordered  - Administer ordered antiemetic medications as needed  - Provide nonpharmacologic comfort measures as appropriate  - Advance diet as tolerated, if ordered  - Consider nutrition services referral to assist patient with adequate nutrition and appropriate food choices  Outcome: Progressing     Problem: METABOLIC, FLUID AND ELECTROLYTES - ADULT  Goal: Electrolytes maintained within normal limits  Description: INTERVENTIONS:  - Monitor labs and assess patient for signs and symptoms of electrolyte imbalances  - Administer electrolyte replacement as ordered  - Monitor response to electrolyte replacements, including repeat lab results as appropriate  - Instruct patient on fluid and nutrition as appropriate  Outcome: Progressing

## 2024-11-04 NOTE — PLAN OF CARE
Problem: GASTROINTESTINAL - ADULT  Goal: Minimal or absence of nausea and/or vomiting  Description: INTERVENTIONS:  - Administer IV fluids if ordered to ensure adequate hydration  - Maintain NPO status until nausea and vomiting are resolved  - Nasogastric tube if ordered  - Administer ordered antiemetic medications as needed  - Provide nonpharmacologic comfort measures as appropriate  - Advance diet as tolerated, if ordered  - Consider nutrition services referral to assist patient with adequate nutrition and appropriate food choices  Outcome: Progressing  Goal: Maintains or returns to baseline bowel function  Description: INTERVENTIONS:  - Assess bowel function  - Encourage oral fluids to ensure adequate hydration  - Administer IV fluids if ordered to ensure adequate hydration  - Administer ordered medications as needed  - Encourage mobilization and activity  - Consider nutritional services referral to assist patient with adequate nutrition and appropriate food choices  Outcome: Progressing  Goal: Maintains adequate nutritional intake  Description: INTERVENTIONS:  - Monitor percentage of each meal consumed  - Identify factors contributing to decreased intake, treat as appropriate  - Assist with meals as needed  - Monitor I&O, weight, and lab values if indicated  - Obtain nutrition services referral as needed  Outcome: Progressing  Goal: Oral mucous membranes remain intact  Description: INTERVENTIONS  - Assess oral mucosa and hygiene practices  - Implement preventative oral hygiene regimen  - Implement oral medicated treatments as ordered  - Initiate Nutrition services referral as needed  Outcome: Progressing     Problem: METABOLIC, FLUID AND ELECTROLYTES - ADULT  Goal: Electrolytes maintained within normal limits  Description: INTERVENTIONS:  - Monitor labs and assess patient for signs and symptoms of electrolyte imbalances  - Administer electrolyte replacement as ordered  - Monitor response to electrolyte  replacements, including repeat lab results as appropriate  - Instruct patient on fluid and nutrition as appropriate  Outcome: Progressing  Goal: Fluid balance maintained  Description: INTERVENTIONS:  - Monitor labs   - Monitor I/O and WT  - Instruct patient on fluid and nutrition as appropriate  - Assess for signs & symptoms of volume excess or deficit  Outcome: Progressing  Goal: Glucose maintained within target range  Description: INTERVENTIONS:  - Monitor Blood Glucose as ordered  - Assess for signs and symptoms of hyperglycemia and hypoglycemia  - Administer ordered medications to maintain glucose within target range  - Assess nutritional intake and initiate nutrition service referral as needed  Outcome: Progressing     Problem: HEMATOLOGIC - ADULT  Goal: Maintains hematologic stability  Description: INTERVENTIONS  - Assess for signs and symptoms of bleeding or hemorrhage  - Monitor labs  - Administer supportive blood products/factors as ordered and appropriate  Outcome: Progressing     Problem: MUSCULOSKELETAL - ADULT  Goal: Maintain or return mobility to safest level of function  Description: INTERVENTIONS:  - Assess patient's ability to carry out ADLs; assess patient's baseline for ADL function and identify physical deficits which impact ability to perform ADLs (bathing, care of mouth/teeth, toileting, grooming, dressing, etc.)  - Assess/evaluate cause of self-care deficits   - Assess range of motion  - Assess patient's mobility  - Assess patient's need for assistive devices and provide as appropriate  - Encourage maximum independence but intervene and supervise when necessary  - Involve family in performance of ADLs  - Assess for home care needs following discharge   - Consider OT consult to assist with ADL evaluation and planning for discharge  - Provide patient education as appropriate  Outcome: Progressing

## 2024-11-04 NOTE — CONSULTS
Consultation - Gabby Olivera 44 y.o. female MRN: [id number]    Unit/Bed#: 2 [place] [address] A Encounter: [id number]      Assessment & Plan     Type 2 diabetes mellitus not on long-term insulin therapy with hyperglycemia  Steroid induced hyperglycemia - currently not on glucocorticoids   Poorly controlled with A1c 11%  Currently on an insulin drip with improvement in blood sugars  Blood work on presentation, low bicarbonate however anion gap was within normal limits.  Mildly elevated beta-hydroxybutyrate.  Patient did not have DKA based on labs however it is possible that she may have been tending towards it.  -For now, would recommend continuing insulin drip for now, the patient is feeling better, has improved oral intake, will plan to transition to subcutaneous basal bolus insulin possibly tomorrow  ? FRED - will check antibodies     3. Hypothyroidism-TSH elevated, free T4 within normal limits  Resume levothyroxine at 25 mcg orally daily.  Repeat TSH, free T4 in 6 to 8 weeks  Encouraged patient to establish care with primary care as well as endocrinology as an outpatient    4. Dizziness, vertigo, hearing loss - care per primary team/ENT    CC: Diabetes Consult    History of Present Illness     HPI: Gabby Olivera is a 44 y.o. year old with past medical history of type 2 diabetes mellitus, hypothyroidism who presented to the ED yesterday 11/3/2024 with complaints of headache, dizziness, nausea, vomiting.  Patient had previously presented on 10/27 with flulike symptoms, on 10/31 with left-sided earache, decrease in hearing.    On labs, blood sugar 517 mg/dL, bicarb 17, anion gap 13  Beta-hydroxybutyrate 1.91; Venous pH 7.364  Patient was started on a non DKA inulin drip   Endocrinology has been consulted for management of diabetes mellitus.       [id number]     female with type 2 diabetes for 10 years.  Has been prescribed oral agents at home-metformin however states that she has not been  recently taking this.  Does not have an established primary care physician/endocrinologist, states that her medications have been prescribed by ED physicians.  Also has a past medical history of hypothyroidism, used to take levothyroxine 50 mcg orally daily.  She has not taken it for the last 1 month.  Family history-mother, grandmother, aunt with diabetes mellitus.  Has had polyuria, polydipsia.  Lost 6 pounds in the last 6 months  Took prednisone for approximately 4 days prior to admission for her ear.    States that she does not have as much nausea as compared to before however continues to have headaches, dizziness.  Vertigo is bothersome.  She is very concerned about the hearing loss, asked ENT will be her in the hospital and when.  She also asked about continuation of prednisone.    Did not eat much for breakfast, had a few sips of juice.  Ate beef with broccoli for lunch.        Inpatient consult to Endocrinology  Consult performed by: Jimena Gonzalez MD  Consult ordered by: Saida Metz MD          Review of Systems    Historical Information   Past Medical History:   Diagnosis Date    Diabetes mellitus (HCC)     Disease of thyroid gland      Past Surgical History:   Procedure Laterality Date     SECTION       Social History   Social History     Substance and Sexual Activity   Alcohol Use Yes    Comment: socially     Social History     Substance and Sexual Activity   Drug Use Never     Social History     Tobacco Use   Smoking Status Never   Smokeless Tobacco Never     Family History:   Family History   Problem Relation Age of Onset    Diabetes Mother     No Known Problems Father     Diabetes Sister     No Known Problems Brother     No Known Problems Brother     No Known Problems Son     No Known Problems Son     Diabetes Maternal Grandmother     Breast cancer Neg Hx     Colon cancer Neg Hx     Ovarian cancer Neg Hx        Meds/Allergies   Current Facility-Administered Medications   Medication Dose  "Route Frequency Provider Last Rate Last Admin    acetaminophen (TYLENOL) tablet 650 mg  650 mg Oral Q6H PRN Saida Metz MD        cefTRIAXone (ROCEPHIN) IVPB (premix in dextrose) 1,000 mg 50 mL  1,000 mg Intravenous Q24H Saida Metz  mL/hr at 11/04/24 0618 1,000 mg at 11/04/24 0618    enoxaparin (LOVENOX) subcutaneous injection 40 mg  40 mg Subcutaneous Daily Saida Metz MD   40 mg at 11/04/24 0921    influenza vaccine (PF) (Fluarix) IM injection 0.5 mL  0.5 mL Intramuscular Prior to discharge Saida Metz MD        insulin regular (HumuLIN R,NovoLIN R) 1 Units/mL in sodium chloride 0.9 % 100 mL infusion  0.3-21 Units/hr Intravenous Titrated Saida Metz MD 2 mL/hr at 11/04/24 1256 2 Units/hr at 11/04/24 1256    meclizine (ANTIVERT) tablet 12.5 mg  12.5 mg Oral Q8H PRN Saida Metz MD        ondansetron (ZOFRAN) injection 4 mg  4 mg Intravenous Q6H PRN Saida Metz MD        pneumococcal 20-selena conj vacc (PREVNAR 20) IM Injection 0.5 mL  0.5 mL Intramuscular Prior to discharge Saida Metz MD        sodium chloride 0.9 % bolus 1,000 mL  1,000 mL Intravenous Once Bala Dudley DO        sodium chloride infusion 0.45 %  75 mL/hr Intravenous Continuous Saida Metz MD 75 mL/hr at 11/04/24 0244 75 mL/hr at 11/04/24 0244     Allergies   Allergen Reactions    Macrodantin [Nitrofurantoin] Nausea Only    Nitrofurantoin Vomiting       Objective   Vitals: Blood pressure 118/68, pulse 81, temperature 98.2 °F (36.8 °C), temperature source Oral, resp. rate 18, height 5' 2\" (1.575 m), weight 58.5 kg (129 lb), SpO2 98%, not currently breastfeeding.    Intake/Output Summary (Last 24 hours) at 11/4/2024 1346  Last data filed at 11/4/2024 0350  Gross per 24 hour   Intake 50 ml   Output 450 ml   Net -400 ml     Invasive Devices       Peripheral Intravenous Line  Duration             Peripheral IV 11/03/24 Left Antecubital <1 day    Peripheral IV 11/03/24 Right Antecubital <1 day              " "      Physical Exam    Constitutional:Oriented to person, place, and time  Appears well-developed and well-nourished. Not in any acute distress, looks uncomfortable  HENT:   Head: Normocephalic and atraumatic.   Neck: Normal range of motion.   Pulmonary/Chest: Effort normal/ breathing comfortably on room air   Musculoskeletal: Normal range of motion.   Neurological: Alert and oriented to person, place, and time.   Skin: Not diaphoretic.   Psychiatric: Normal mood and affect. Behavior is normal.       The history was obtained from the review of the chart, patient.    Lab Results:   Results from last 7 days   Lab Units 11/03/24 1944   HEMOGLOBIN A1C % 11.0*     Component      Latest Ref Rng 1/26/2024 4/24/2024 11/3/2024   TSH 3RD GENERATON      0.450 - 4.500 uIU/mL 22.633 (H)  11.513 (H)  12.089 (H)    FREE T4      0.61 - 1.12 ng/dL 0.83   0.74         Lab Results   Component Value Date    WBC 9.53 11/04/2024    HGB 12.6 11/04/2024    HCT 36.6 11/04/2024    MCV 88 11/04/2024     11/04/2024     Lab Results   Component Value Date/Time    BUN 12 11/04/2024 06:27 AM    K 3.7 11/04/2024 06:27 AM     (H) 11/04/2024 06:27 AM    CO2 19 (L) 11/04/2024 06:27 AM    CREATININE 0.51 (L) 11/04/2024 06:27 AM    AST 8 (L) 11/03/2024 07:44 PM    ALT 9 11/03/2024 07:44 PM    TP 7.9 11/03/2024 07:44 PM    ALB 4.4 11/03/2024 07:44 PM     Recent Labs     11/03/24  2207   HDL 38*   TRIG 471*     No results found for: \"MICROALBUR\", \"[id number]\"  POC Glucose (mg/dl)   Date Value   11/04/2024 197 (H)   11/04/2024 144 (H)   11/04/2024 248 (H)   11/04/2024 118   11/04/2024 113   11/04/2024 162 (H)   11/04/2024 266 (H)   11/03/2024 313 (H)   11/03/2024 413 (HH)   10/27/2024 270 (H)       Imaging Studies: Results Review Statement: No pertinent imaging studies reviewed.    Portions of the record may have been created with voice recognition software.  Occasional wrong word or \"sound a like\" substitutions may have occurred due to the " inherent limitations of voice recognition software.  Read the chart carefully and recognize, using context, where substitutions have occurred.

## 2024-11-04 NOTE — ASSESSMENT & PLAN NOTE
Previously on levothyroxine, has not been taking  TSH elevated, T4 within normal limits  Endocrinology following  Restarted on levothyroxine 25 mcg daily  Will need repeat labs in 6-8 weeks  Follow up with PCP/endocrinology outpatient

## 2024-11-05 LAB
ANION GAP SERPL CALCULATED.3IONS-SCNC: 8 MMOL/L (ref 4–13)
BUN SERPL-MCNC: 18 MG/DL (ref 5–25)
CALCIUM SERPL-MCNC: 8.6 MG/DL (ref 8.4–10.2)
CHLORIDE SERPL-SCNC: 114 MMOL/L (ref 96–108)
CO2 SERPL-SCNC: 18 MMOL/L (ref 21–32)
CREAT SERPL-MCNC: 0.56 MG/DL (ref 0.6–1.3)
ERYTHROCYTE [DISTWIDTH] IN BLOOD BY AUTOMATED COUNT: 12 % (ref 11.6–15.1)
GFR SERPL CREATININE-BSD FRML MDRD: 114 ML/MIN/1.73SQ M
GLUCOSE P FAST SERPL-MCNC: 107 MG/DL (ref 65–99)
GLUCOSE SERPL-MCNC: 104 MG/DL (ref 65–140)
GLUCOSE SERPL-MCNC: 107 MG/DL (ref 65–140)
GLUCOSE SERPL-MCNC: 116 MG/DL (ref 65–140)
GLUCOSE SERPL-MCNC: 117 MG/DL (ref 65–140)
GLUCOSE SERPL-MCNC: 131 MG/DL (ref 65–140)
GLUCOSE SERPL-MCNC: 237 MG/DL (ref 65–140)
GLUCOSE SERPL-MCNC: 287 MG/DL (ref 65–140)
GLUCOSE SERPL-MCNC: 337 MG/DL (ref 65–140)
GLUCOSE SERPL-MCNC: 351 MG/DL (ref 65–140)
HCT VFR BLD AUTO: 38.6 % (ref 34.8–46.1)
HGB BLD-MCNC: 13.3 G/DL (ref 11.5–15.4)
MCH RBC QN AUTO: 30.5 PG (ref 26.8–34.3)
MCHC RBC AUTO-ENTMCNC: 34.5 G/DL (ref 31.4–37.4)
MCV RBC AUTO: 89 FL (ref 82–98)
PLATELET # BLD AUTO: 209 THOUSANDS/UL (ref 149–390)
PMV BLD AUTO: 10.2 FL (ref 8.9–12.7)
POTASSIUM SERPL-SCNC: 3.1 MMOL/L (ref 3.5–5.3)
RBC # BLD AUTO: 4.36 MILLION/UL (ref 3.81–5.12)
SODIUM SERPL-SCNC: 140 MMOL/L (ref 135–147)
WBC # BLD AUTO: 8.03 THOUSAND/UL (ref 4.31–10.16)

## 2024-11-05 PROCEDURE — 99232 SBSQ HOSP IP/OBS MODERATE 35: CPT

## 2024-11-05 PROCEDURE — 82948 REAGENT STRIP/BLOOD GLUCOSE: CPT

## 2024-11-05 PROCEDURE — 99232 SBSQ HOSP IP/OBS MODERATE 35: CPT | Performed by: INTERNAL MEDICINE

## 2024-11-05 PROCEDURE — 80048 BASIC METABOLIC PNL TOTAL CA: CPT

## 2024-11-05 PROCEDURE — 99253 IP/OBS CNSLTJ NEW/EST LOW 45: CPT | Performed by: OTOLARYNGOLOGY

## 2024-11-05 PROCEDURE — 97167 OT EVAL HIGH COMPLEX 60 MIN: CPT

## 2024-11-05 PROCEDURE — 85027 COMPLETE CBC AUTOMATED: CPT

## 2024-11-05 RX ORDER — INSULIN LISPRO 100 [IU]/ML
1-5 INJECTION, SOLUTION INTRAVENOUS; SUBCUTANEOUS
Status: DISCONTINUED | OUTPATIENT
Start: 2024-11-05 | End: 2024-11-08 | Stop reason: HOSPADM

## 2024-11-05 RX ORDER — INSULIN LISPRO 100 [IU]/ML
5 INJECTION, SOLUTION INTRAVENOUS; SUBCUTANEOUS
Status: DISCONTINUED | OUTPATIENT
Start: 2024-11-05 | End: 2024-11-06

## 2024-11-05 RX ORDER — INSULIN GLARGINE 100 [IU]/ML
15 INJECTION, SOLUTION SUBCUTANEOUS
Status: DISCONTINUED | OUTPATIENT
Start: 2024-11-05 | End: 2024-11-06

## 2024-11-05 RX ORDER — POTASSIUM CHLORIDE 1500 MG/1
40 TABLET, EXTENDED RELEASE ORAL 2 TIMES DAILY
Status: COMPLETED | OUTPATIENT
Start: 2024-11-05 | End: 2024-11-05

## 2024-11-05 RX ORDER — MECLIZINE HCL 12.5 MG 12.5 MG/1
12.5 TABLET ORAL EVERY 8 HOURS SCHEDULED
Status: DISCONTINUED | OUTPATIENT
Start: 2024-11-05 | End: 2024-11-06

## 2024-11-05 RX ADMIN — MECLIZINE HYDROCHLORIDE 12.5 MG: 12.5 TABLET ORAL at 22:30

## 2024-11-05 RX ADMIN — INSULIN LISPRO 2 UNITS: 100 INJECTION, SOLUTION INTRAVENOUS; SUBCUTANEOUS at 22:32

## 2024-11-05 RX ADMIN — INSULIN LISPRO 5 UNITS: 100 INJECTION, SOLUTION INTRAVENOUS; SUBCUTANEOUS at 16:20

## 2024-11-05 RX ADMIN — INSULIN LISPRO 5 UNITS: 100 INJECTION, SOLUTION INTRAVENOUS; SUBCUTANEOUS at 12:22

## 2024-11-05 RX ADMIN — AMOXICILLIN AND CLAVULANATE POTASSIUM 1 TABLET: 875; 125 TABLET, COATED ORAL at 10:41

## 2024-11-05 RX ADMIN — MECLIZINE HYDROCHLORIDE 12.5 MG: 12.5 TABLET ORAL at 16:20

## 2024-11-05 RX ADMIN — INSULIN LISPRO 2 UNITS: 100 INJECTION, SOLUTION INTRAVENOUS; SUBCUTANEOUS at 16:20

## 2024-11-05 RX ADMIN — POTASSIUM CHLORIDE 40 MEQ: 1500 TABLET, EXTENDED RELEASE ORAL at 10:41

## 2024-11-05 RX ADMIN — INSULIN LISPRO 3 UNITS: 100 INJECTION, SOLUTION INTRAVENOUS; SUBCUTANEOUS at 10:41

## 2024-11-05 RX ADMIN — LEVOTHYROXINE SODIUM 25 MCG: 25 TABLET ORAL at 05:02

## 2024-11-05 RX ADMIN — POTASSIUM CHLORIDE 40 MEQ: 1500 TABLET, EXTENDED RELEASE ORAL at 18:15

## 2024-11-05 RX ADMIN — AMOXICILLIN AND CLAVULANATE POTASSIUM 1 TABLET: 875; 125 TABLET, COATED ORAL at 22:30

## 2024-11-05 RX ADMIN — ENOXAPARIN SODIUM 40 MG: 40 INJECTION SUBCUTANEOUS at 10:42

## 2024-11-05 RX ADMIN — MECLIZINE HYDROCHLORIDE 12.5 MG: 12.5 TABLET ORAL at 10:41

## 2024-11-05 RX ADMIN — INSULIN GLARGINE 15 UNITS: 100 INJECTION, SOLUTION SUBCUTANEOUS at 10:41

## 2024-11-05 NOTE — PROGRESS NOTES
"Progress Note - Hospitalist   Name: Gabby Olivera 44 y.o. female I MRN: [id number]  Unit/Bed#: 2 [place] [address] A I Date of Admission: 11/3/2024   Date of Service: 11/5/2024 I Hospital Day: 0    Assessment & Plan  Uncontrolled diabetes mellitus with hyperglycemia (HCC)  Lab Results   Component Value Date    HGBA1C 11.0 (H) 11/03/2024     Recent Labs     11/05/24  0316 11/05/24  0505 11/05/24  0706 11/05/24  0933   POCGLU 117 116 104 337*     Blood Sugar Average: Last 72 hrs:  (P) 195.05  Noted with sugars in 400s-500s on admission, low bicarb, normal AG, mildly elevated beta-hydroxybutyrate  On metformin 500 mg bid, reports she has not been taking it because it \"doesn't help\"  Started on non DKA insulin gtt with improvement  Endocrinology consulted  Drip discontinued and started on Lantus 14 units daily and humalog 5 units tid with SSI  Diabetic diet  Dizziness  Patient presented to ED with dizziness starting day of admission that is worse with head turning and position changes associated with nausea, vomiting, and difficulty ambulating. Patient reports headache as well. She also reports hearing loss and a \"noise\" in her left ear over the past week.  Patient seen in [facility] ED 10/27 for viral syndrome and again on 10/31 for decreased hearing and tinnitus in left ear and was discharged home with referral to ENT.  CTA head/neck negative for acute intracranial abnormality, did show sinus disease  Suspect peripheral vertigo given tinnitus, decreased hearing and recent viral illness  Start scheduled meclizine  Start augmentin  Zofran prn  Check orthostatic vitals  ENT was consulted  PT/OT  Hypothyroid  Previously on levothyroxine, has not been taking  TSH elevated, T4 within normal limits  Endocrinology following  Restarted on levothyroxine 25 mcg daily  Will need repeat labs in 6-8 weeks  Follow up with PCP/endocrinology outpatient  Abnormal urinalysis  UA positive for leukocytes, WBC  Patient asymptomatic  No fever " or leukocytosis  Urine culture pending  Received dose of rocephin, now on augmentin for sinusitis  Hyponatremia  Likely pseudohyponatremia due to hyperglycemia  Daily bmp  Tinnitus of left ear  As outlined below    VTE Pharmacologic Prophylaxis:   Moderate Risk (Score 3-4) - Pharmacological DVT Prophylaxis Ordered: enoxaparin (Lovenox).    Mobility:   Basic Mobility Inpatient Raw Score: 17  JH-HLM Goal: 5: Stand one or more mins  JH-HLM Achieved: 7: Walk 25 feet or more  JH-HLM Goal achieved. Continue to encourage appropriate mobility.    Patient Centered Rounds: I performed bedside rounds with nursing staff today.   Discussions with Specialists or Other Care Team Provider: rnoksana    Education and Discussions with Family / Patient: Patient declined call to .     Current Length of Stay: 0 day(s)  Current Patient Status: Observation   Certification Statement: The patient will continue to require additional inpatient hospital stay due to hyperglycemia, dizziness, ENT consult  Discharge Plan: Anticipate discharge in 24-48 hrs to home.    Code Status: Level 1 - Full Code    Subjective   Patient seen and examined using Guatem[place] interpretor #[id number].  Patient states dizziness is overall improving but still worse when she turns her head or when she gets up and is having difficulty ambulating.  She reports ongoing intermittent tinnitus and left-sided hearing loss.  She has good appetite and denies current nausea, vomiting, or abdominal pain.     Objective :  Temp:  [97.5 °F (36.4 °C)] 97.5 °F (36.4 °C)  HR:  [] 95  BP: (112-123)/(68-74) 115/70  Resp:  [14-18] 18  SpO2:  [94 %-97 %] 97 %  O2 Device: None (Room air)    Body mass index is 23.59 kg/m².     Input and Output Summary (last 24 hours):   No intake or output data in the 24 hours ending 11/05/24 1056    Physical Exam  Vitals and nursing note reviewed.   Constitutional:       General: She is not in acute distress.     Appearance: She is well-developed.    Cardiovascular:      Rate and Rhythm: Normal rate and regular rhythm.   Pulmonary:      Effort: Pulmonary effort is normal. No respiratory distress.      Breath sounds: Normal breath sounds.   Abdominal:      Tenderness: There is no abdominal tenderness.   Musculoskeletal:         General: No swelling.   Skin:     General: Skin is warm and dry.      Capillary Refill: Capillary refill takes less than 2 seconds.   Neurological:      General: No focal deficit present.      Mental Status: She is alert and oriented to person, place, and time.   Psychiatric:         Mood and Affect: Mood normal.       Lines/Drains:      Lab Results: I have reviewed the following results:   Results from last 7 days   Lab Units 11/05/24  0501 11/04/24  0627 11/03/24  1944   WBC Thousand/uL 8.03   < > 10.89*   HEMOGLOBIN g/dL 13.3   < > 14.7   HEMATOCRIT % 38.6   < > 41.8   PLATELETS Thousands/uL 209   < > 232   SEGS PCT %  --   --  84*   LYMPHO PCT %  --   --  6*   MONO PCT %  --   --  9   EOS PCT %  --   --  0    < > = values in this interval not displayed.     Results from last 7 days   Lab Units 11/05/24  0501 11/03/24  2207 11/03/24  1944   SODIUM mmol/L 140   < > 130*   POTASSIUM mmol/L 3.1*   < > 3.6   CHLORIDE mmol/L 114*   < > 100   CO2 mmol/L 18*   < > 17*   BUN mg/dL 18   < > 16   CREATININE mg/dL 0.56*   < > 0.77   ANION GAP mmol/L 8   < > 13   CALCIUM mg/dL 8.6   < > 8.8   ALBUMIN g/dL  --   --  4.4   TOTAL BILIRUBIN mg/dL  --   --  0.82   ALK PHOS U/L  --   --  70   ALT U/L  --   --  9   AST U/L  --   --  8*   GLUCOSE RANDOM mg/dL 107   < > 517*    < > = values in this interval not displayed.         Results from last 7 days   Lab Units 11/05/24  0933 11/05/24  0706 11/05/24  0505 11/05/24  0316 11/05/24  0051 11/04/24  2312 11/04/24  2106 11/04/24  1858 11/04/24  1706 11/04/24  1612 11/04/24  1500 11/04/24  1255   POC GLUCOSE mg/dl 337* 104 116 117 131 122 157* 179* 205* 227* 232* 197*     Results from last 7 days   Lab  Units 11/03/24  1944   HEMOGLOBIN A1C % 11.0*           Recent Cultures (last 7 days):   Results from last 7 days   Lab Units 11/04/24  0354   URINE CULTURE  Culture too young- will reincubate     Imaging Results Review: I reviewed radiology reports from this admission including: CT head.  Other Study Results Review: No additional pertinent studies reviewed.    Last 24 Hours Medication List:     Current Facility-Administered Medications:     acetaminophen (TYLENOL) tablet 650 mg, Q6H PRN    amoxicillin-clavulanate (AUGMENTIN) 875-125 mg per tablet 1 tablet, Q12H MABEL    enoxaparin (LOVENOX) subcutaneous injection 40 mg, Daily    influenza vaccine (PF) (Fluarix) IM injection 0.5 mL, Prior to discharge    insulin glargine (LANTUS) subcutaneous injection 15 Units 0.15 mL, Daily With Breakfast    insulin lispro (HumALOG/ADMELOG) 100 units/mL subcutaneous injection 1-5 Units, TID AC **AND** Fingerstick Glucose (POCT), TID AC    insulin lispro (HumALOG/ADMELOG) 100 units/mL subcutaneous injection 1-5 Units, HS    insulin lispro (HumALOG/ADMELOG) 100 units/mL subcutaneous injection 5 Units, TID With Meals    levothyroxine tablet 25 mcg, Early Morning    meclizine (ANTIVERT) tablet 12.5 mg, Q8H MABEL    ondansetron (ZOFRAN) injection 4 mg, Q6H PRN    pneumococcal 20-selena conj vacc (PREVNAR 20) IM Injection 0.5 mL, Prior to discharge    potassium chloride (Klor-Con M20) CR tablet 40 mEq, BID    sodium chloride 0.9 % bolus 1,000 mL, Once    Administrative Statements   Today, Patient Was Seen By: Florence Bourne PA-C    **Please Note: This note may have been constructed using a voice recognition system.**

## 2024-11-05 NOTE — ASSESSMENT & PLAN NOTE
Lab Results   Component Value Date    HGBA1C 11.0 (H) 11/03/2024       Recent Labs     11/05/24  0316 11/05/24  0505 11/05/24  0706 11/05/24  0933   POCGLU 117 116 104 337*       Blood Sugar Average: Last 72 hrs:  (P) 195.05    Poorly controlled with A1c 11%  -Discontinue insulin drip  -Start Lantus 15 units subcutaneously daily, first dose now  - start Humalog 5 units with meals 3 times a day  -Start sliding scale insulin  Will continue to follow

## 2024-11-05 NOTE — CASE MANAGEMENT
Case Management Assessment & Discharge Planning Note    Patient name Gabby Olivera  Location [facility] A MRN [id number]  : 1980 Date 2024       Current Admission Date: 11/3/2024  Current Admission Diagnosis:Uncontrolled diabetes mellitus with hyperglycemia (HCC)   Patient Active Problem List    Diagnosis Date Noted Date Diagnosed    Abnormal urinalysis 2024     Uncontrolled diabetes mellitus with hyperglycemia (HCC) 2024     Acidosis 2024     Tinnitus of left ear 2024     Dizziness 2024     Hyponatremia 2024     Non compliance w medication regimen 2024     Hypothyroid 2024     Vaginal bleeding 2024     Uterine fibroid 2024     Encounter for counseling regarding contraception 2023       LOS (days): 0  Geometric Mean LOS (GMLOS) (days):   Days to GMLOS:     OBJECTIVE:     Current admission status: Observation  Referral Reason: Other (SDOH)    Preferred Pharmacy:   [facility]/pharmacy #[id number] - [place] [address]  Phone: [phone] Fax: [phone]    Primary Care Provider: No primary care provider on file.    Primary Insurance:   Secondary Insurance:     ASSESSMENT:  Active Health Care Proxies    There are no active Health Care Proxies on file.       Readmission Root Cause  30 Day Readmission: No    Patient Information  Admitted from:: Home  Mental Status: Alert  During Assessment patient was accompanied by: Not accompanied during assessment  Assessment information provided by:: Patient  Primary Caregiver: Self  Support Systems: Self, Family members  County of Residence: [place]  What city do you live in?: [place]  Type of Current Residence: Other (Comment) (rents room in an attic)  Living Arrangements: Lives Alone    Activities of Daily Living Prior to Admission  Functional Status: Independent  Completes ADLs independently?: Yes  Ambulates independently?:  Yes  Does patient currently have HHC?: No    Patient Information Continued  Income Source: Unemployed (was working until she became ill)  Does patient have prescription coverage?: No  Does patient receive dialysis treatments?: No    Means of Transportation  Means of Transport to Appts:: Family transport      Social Determinants of Health (SDOH)      Flowsheet Row Most Recent Value   Housing Stability    In the last 12 months, was there a time when you were not able to pay the mortgage or rent on time? Y  [will have difficulty now that she is sick]   In the past 12 months, how many times have you moved where you were living? 5  [moved where she could find work]   At any time in the past 12 months, were you homeless or living in a shelter (including now)? N   Transportation Needs    In the past 12 months, has lack of transportation kept you from medical appointments or from getting medications? yes   In the past 12 months, has lack of transportation kept you from meetings, work, or from getting things needed for daily living? No   Food Insecurity    Within the past 12 months, you worried that your food would run out before you got the money to buy more. Sometimes   Within the past 12 months, the food you bought just didn't last and you didn't have money to get more. Sometimes   Utilities    In the past 12 months has the electric, gas, oil, or water company threatened to shut off services in your home? No            DISCHARGE DETAILS:    Discharge planning discussed with:: Patient  Freedom of Choice: Yes  Comments - Freedom of Choice: SW met with pt to assess needs and discuss plans. [employer] translation service used to communicate (#[id number]). Pt lives alone in an attic room she rents in [place].  Per pt she has moved multiple times over the last year due to work.  At this time she is concerned about not being able to pay rent due to not being able to work since she's been sick.  Pt said she is hoping her sister will let  her stay with her for a little while.  Pt shared that she just recently got work papers and social security number which will make it easier for her to find another job when she is feeling better.  Pt has no medical insurance at this time.  SW offered to make appointment at [facility] for pt for medical follow up for diabetes and other medical issues.  Pt requested appointment be made by DANIELLE.  SW also talked with pt about applying for assistance in [place] either in person at office or online through [employer].  Pt requested information for online application and information was provided.  Paper application, online application information and [facility] Assistance Office address provided to pt.  SW offered ongoing support and assistance to pt. Will follow to assist with medication needs as well.  CM contacted family/caregiver?: Yes (met sister briefly, pt requested to talk with SW after sister left for work)  Were Treatment Team discharge recommendations reviewed with patient/caregiver?: Yes  Did patient/caregiver verbalize understanding of patient care needs?: Yes  Were patient/caregiver advised of the risks associated with not following Treatment Team discharge recommendations?: Yes    Contacts  Patient Contacts: Patient    Requested Home Health Care         Is the patient interested in HHC at discharge?: No    DME Referral Provided  Referral made for DME?: No    Other Referral/Resources/Interventions Provided:  Financial Resources Provided: [employer] [place] Referral, Medicaid, Indigent Medication  Interventions: PCP  Referral Comments: Call placed to [facility] to make appointment for pt.  Requested soonest appointment possible and appointment for 11/20/24 at 2:20 pm was arranged.    Treatment Team Recommendation: Home  Discharge Destination Plan:: Home  Transport at Discharge : Family

## 2024-11-05 NOTE — ASSESSMENT & PLAN NOTE
UA positive for leukocytes, WBC  Patient asymptomatic  No fever or leukocytosis  Urine culture pending  Received dose of rocephin, now on augmentin for sinusitis

## 2024-11-05 NOTE — PLAN OF CARE
Problem: METABOLIC, FLUID AND ELECTROLYTES - ADULT  Goal: Electrolytes maintained within normal limits  Description: INTERVENTIONS:  - Monitor labs and assess patient for signs and symptoms of electrolyte imbalances  - Administer electrolyte replacement as ordered  - Monitor response to electrolyte replacements, including repeat lab results as appropriate  - Instruct patient on fluid and nutrition as appropriate  Outcome: Progressing  Goal: Fluid balance maintained  Description: INTERVENTIONS:  - Monitor labs   - Monitor I/O and WT  - Instruct patient on fluid and nutrition as appropriate  - Assess for signs & symptoms of volume excess or deficit  Outcome: Progressing  Goal: Glucose maintained within target range  Description: INTERVENTIONS:  - Monitor Blood Glucose as ordered  - Assess for signs and symptoms of hyperglycemia and hypoglycemia  - Administer ordered medications to maintain glucose within target range  - Assess nutritional intake and initiate nutrition service referral as needed  Outcome: Progressing     Problem: MUSCULOSKELETAL - ADULT  Goal: Maintain or return mobility to safest level of function  Description: INTERVENTIONS:  - Assess patient's ability to carry out ADLs; assess patient's baseline for ADL function and identify physical deficits which impact ability to perform ADLs (bathing, care of mouth/teeth, toileting, grooming, dressing, etc.)  - Assess/evaluate cause of self-care deficits   - Assess range of motion  - Assess patient's mobility  - Assess patient's need for assistive devices and provide as appropriate  - Encourage maximum independence but intervene and supervise when necessary  - Involve family in performance of ADLs  - Assess for home care needs following discharge   - Consider OT consult to assist with ADL evaluation and planning for discharge  - Provide patient education as appropriate  Outcome: Progressing

## 2024-11-05 NOTE — PROGRESS NOTES
"Progress Note - Endocrinology   Name: Gabby Olivera 44 y.o. female I MRN: [id number]  Unit/Bed#: 2 [place] [address] A  Date of Admission: 11/3/2024   Date of Service: 11/5/2024 I Hospital Day: 0     Assessment & Plan  Uncontrolled diabetes mellitus with hyperglycemia (HCC)  Lab Results   Component Value Date    HGBA1C 11.0 (H) 11/03/2024       Recent Labs     11/05/24  0316 11/05/24  0505 11/05/24  0706 11/05/24  0933   POCGLU 117 116 104 337*       Blood Sugar Average: Last 72 hrs:  (P) 195.05    Poorly controlled with A1c 11%  -Discontinue insulin drip  -Start Lantus 15 units subcutaneously daily, first dose now  - start Humalog 5 units with meals 3 times a day  -Start sliding scale insulin  Will continue to follow    Hypothyroid  Continue levothyroxine 25 mcg orally daily  Repeat thyroid function test in 6 to 8 weeks    Tinnitus of left ear    Dizziness  Care per primary team/ENT        CC: diabetes f/u    Subjective:   Gabby Olivera is a 44 y.o. year old female with type 2 diabetes mellitus, hypothyroidism who presented with headache, dizziness, nausea vomiting, with hyperglycemia.  States that she is feeling slightly better.  Nausea, vomiting has resolved, appetite is good.  When her blood sugars are lower, in the low 100s, has symptoms of anxiety, palpitations.  This morning when she had a blood sugar of 104 mg/dL, insulin drip was held.  Was not given any subcutaneous insulin.  Post breakfast blood sugar 337 mg/dL    States that her dizziness is better, still experiencing it, feels unstable when she tries to walk.  Has been on the left side of her face, continued hearing loss.    Objective:     Vitals: Blood pressure 115/70, pulse 95, temperature 97.5 °F (36.4 °C), resp. rate 18, height 5' 2\" (1.575 m), weight 58.5 kg (129 lb), SpO2 97%, not currently breastfeeding.,Body mass index is 23.59 kg/m².    No intake or output data in the 24 hours ending 11/05/24 1013    Physical Exam:  General " "Appearance: awake, appears stated age and cooperative  Head: Normocephalic, without obvious abnormality, atraumatic  Extremities: moves all extremities  Skin: Skin color and temperature normal.   Pulm: no labored breathing    Lab, Imaging and other studies: Results Review Statement: I reviewed radiology reports from this admission including: CTA brain and neck.    POC Glucose (mg/dl)   Date Value   11/05/2024 337 (H)   11/05/2024 104   11/05/2024 116   11/05/2024 117   11/05/2024 131   11/04/2024 122   11/04/2024 157 (H)   11/04/2024 179 (H)   11/04/2024 205 (H)   11/04/2024 227 (H)           Portions of the record may have been created with voice recognition software.  Occasional wrong word or \"sound a like\" substitutions may have occurred due to the inherent limitations of voice recognition software.  Read the chart carefully and recognize, using context, where substitutions have occurred.       "
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
## 2024-11-05 NOTE — ASSESSMENT & PLAN NOTE
"Lab Results   Component Value Date    HGBA1C 11.0 (H) 11/03/2024     Recent Labs     11/05/24  0316 11/05/24  0505 11/05/24  0706 11/05/24  0933   POCGLU 117 116 104 337*     Blood Sugar Average: Last 72 hrs:  (P) 195.05  Noted with sugars in 400s-500s on admission, low bicarb, normal AG, mildly elevated beta-hydroxybutyrate  On metformin 500 mg bid, reports she has not been taking it because it \"doesn't help\"  Started on non DKA insulin gtt with improvement  Endocrinology consulted  Drip discontinued and started on Lantus 14 units daily and humalog 5 units tid with SSI  Diabetic diet  "

## 2024-11-05 NOTE — ASSESSMENT & PLAN NOTE
"Patient presented to ED with dizziness starting day of admission that is worse with head turning and position changes associated with nausea, vomiting, and difficulty ambulating. Patient reports headache as well. She also reports hearing loss and a \"noise\" in her left ear over the past week.  Patient seen in [facility] ED 10/27 for viral syndrome and again on 10/31 for decreased hearing and tinnitus in left ear and was discharged home with referral to ENT.  CTA head/neck negative for acute intracranial abnormality, did show sinus disease  Suspect peripheral vertigo given tinnitus, decreased hearing and recent viral illness  Start scheduled meclizine  Start augmentin  Zofran prn  Check orthostatic vitals  ENT was consulted  PT/OT  "

## 2024-11-06 LAB
ANION GAP SERPL CALCULATED.3IONS-SCNC: 8 MMOL/L (ref 4–13)
BACTERIA UR CULT: NORMAL
BUN SERPL-MCNC: 16 MG/DL (ref 5–25)
CALCIUM SERPL-MCNC: 8.8 MG/DL (ref 8.4–10.2)
CHLORIDE SERPL-SCNC: 112 MMOL/L (ref 96–108)
CO2 SERPL-SCNC: 16 MMOL/L (ref 21–32)
CREAT SERPL-MCNC: 0.6 MG/DL (ref 0.6–1.3)
ERYTHROCYTE [DISTWIDTH] IN BLOOD BY AUTOMATED COUNT: 12.1 % (ref 11.6–15.1)
GFR SERPL CREATININE-BSD FRML MDRD: 111 ML/MIN/1.73SQ M
GLUCOSE SERPL-MCNC: 178 MG/DL (ref 65–140)
GLUCOSE SERPL-MCNC: 204 MG/DL (ref 65–140)
GLUCOSE SERPL-MCNC: 216 MG/DL (ref 65–140)
GLUCOSE SERPL-MCNC: 260 MG/DL (ref 65–140)
GLUCOSE SERPL-MCNC: 260 MG/DL (ref 65–140)
GLUCOSE SERPL-MCNC: 268 MG/DL (ref 65–140)
HCT VFR BLD AUTO: 38.3 % (ref 34.8–46.1)
HGB BLD-MCNC: 13 G/DL (ref 11.5–15.4)
MCH RBC QN AUTO: 30.4 PG (ref 26.8–34.3)
MCHC RBC AUTO-ENTMCNC: 33.9 G/DL (ref 31.4–37.4)
MCV RBC AUTO: 90 FL (ref 82–98)
PLATELET # BLD AUTO: 199 THOUSANDS/UL (ref 149–390)
PMV BLD AUTO: 9.7 FL (ref 8.9–12.7)
POTASSIUM SERPL-SCNC: 3.6 MMOL/L (ref 3.5–5.3)
RBC # BLD AUTO: 4.27 MILLION/UL (ref 3.81–5.12)
SODIUM SERPL-SCNC: 136 MMOL/L (ref 135–147)
WBC # BLD AUTO: 5.04 THOUSAND/UL (ref 4.31–10.16)

## 2024-11-06 PROCEDURE — 82948 REAGENT STRIP/BLOOD GLUCOSE: CPT

## 2024-11-06 PROCEDURE — 85027 COMPLETE CBC AUTOMATED: CPT

## 2024-11-06 PROCEDURE — 80048 BASIC METABOLIC PNL TOTAL CA: CPT

## 2024-11-06 PROCEDURE — 99232 SBSQ HOSP IP/OBS MODERATE 35: CPT | Performed by: NURSE PRACTITIONER

## 2024-11-06 RX ORDER — SENNOSIDES 8.6 MG
1 TABLET ORAL
Status: DISCONTINUED | OUTPATIENT
Start: 2024-11-06 | End: 2024-11-08 | Stop reason: HOSPADM

## 2024-11-06 RX ORDER — INSULIN GLARGINE 100 [IU]/ML
18 INJECTION, SOLUTION SUBCUTANEOUS
Status: DISCONTINUED | OUTPATIENT
Start: 2024-11-07 | End: 2024-11-08 | Stop reason: HOSPADM

## 2024-11-06 RX ORDER — INSULIN LISPRO 100 [IU]/ML
6 INJECTION, SOLUTION INTRAVENOUS; SUBCUTANEOUS
Status: DISCONTINUED | OUTPATIENT
Start: 2024-11-06 | End: 2024-11-08 | Stop reason: HOSPADM

## 2024-11-06 RX ORDER — INSULIN LISPRO 100 [IU]/ML
6 INJECTION, SOLUTION INTRAVENOUS; SUBCUTANEOUS
Status: DISCONTINUED | OUTPATIENT
Start: 2024-11-06 | End: 2024-11-06

## 2024-11-06 RX ORDER — MECLIZINE HYDROCHLORIDE 25 MG/1
25 TABLET ORAL EVERY 8 HOURS SCHEDULED
Status: DISCONTINUED | OUTPATIENT
Start: 2024-11-06 | End: 2024-11-08 | Stop reason: HOSPADM

## 2024-11-06 RX ADMIN — AMOXICILLIN AND CLAVULANATE POTASSIUM 1 TABLET: 875; 125 TABLET, COATED ORAL at 08:00

## 2024-11-06 RX ADMIN — SENNOSIDES 8.6 MG: 8.6 TABLET, FILM COATED ORAL at 21:18

## 2024-11-06 RX ADMIN — MECLIZINE HYDROCHLORIDE 25 MG: 25 TABLET ORAL at 13:02

## 2024-11-06 RX ADMIN — MECLIZINE HYDROCHLORIDE 12.5 MG: 12.5 TABLET ORAL at 06:08

## 2024-11-06 RX ADMIN — AMOXICILLIN AND CLAVULANATE POTASSIUM 1 TABLET: 875; 125 TABLET, COATED ORAL at 21:18

## 2024-11-06 RX ADMIN — ACETAMINOPHEN 650 MG: 325 TABLET ORAL at 13:05

## 2024-11-06 RX ADMIN — INSULIN LISPRO 1 UNITS: 100 INJECTION, SOLUTION INTRAVENOUS; SUBCUTANEOUS at 08:01

## 2024-11-06 RX ADMIN — INSULIN LISPRO 6 UNITS: 100 INJECTION, SOLUTION INTRAVENOUS; SUBCUTANEOUS at 13:00

## 2024-11-06 RX ADMIN — INSULIN LISPRO 2 UNITS: 100 INJECTION, SOLUTION INTRAVENOUS; SUBCUTANEOUS at 12:59

## 2024-11-06 RX ADMIN — INSULIN LISPRO 5 UNITS: 100 INJECTION, SOLUTION INTRAVENOUS; SUBCUTANEOUS at 08:00

## 2024-11-06 RX ADMIN — INSULIN LISPRO 2 UNITS: 100 INJECTION, SOLUTION INTRAVENOUS; SUBCUTANEOUS at 17:06

## 2024-11-06 RX ADMIN — INSULIN LISPRO 1 UNITS: 100 INJECTION, SOLUTION INTRAVENOUS; SUBCUTANEOUS at 21:22

## 2024-11-06 RX ADMIN — INSULIN LISPRO 6 UNITS: 100 INJECTION, SOLUTION INTRAVENOUS; SUBCUTANEOUS at 17:06

## 2024-11-06 RX ADMIN — LEVOTHYROXINE SODIUM 25 MCG: 25 TABLET ORAL at 06:08

## 2024-11-06 RX ADMIN — MECLIZINE HYDROCHLORIDE 25 MG: 25 TABLET ORAL at 21:18

## 2024-11-06 RX ADMIN — ENOXAPARIN SODIUM 40 MG: 40 INJECTION SUBCUTANEOUS at 08:00

## 2024-11-06 RX ADMIN — INSULIN GLARGINE 15 UNITS: 100 INJECTION, SOLUTION SUBCUTANEOUS at 08:00

## 2024-11-06 NOTE — UTILIZATION REVIEW
SEE INITIAL REVIEW AT BOTTOM    Continued Stay Review    Date: 11/6  Day 2 IP:                        Current Patient Class: Inpatient  Current Level of Care: MS     HPI:44 y.o. female initially admitted on 11/3 as obs, 11/5 as IP      Assessment/Plan:   Uncontrolled DM, dizziness, abnormal UA - insulin drip and IV fluids d/c 11/5.  Increasing Meclicine to 25 mg q 8 hr, continue antibiotics.  No c/o N/V.  + constipation will order laxative.  Pt continues with elevated glucose.  Endocrinology making adjustments to insulin:   Increase Lantus to 18 units subcutaneously daily  -Increase Humalog to 6 units with meals 3 times a day  -Continue sliding scale insulin  -DEE antibody, islet antibody-results pending    Medications:   Scheduled Medications:  amoxicillin-clavulanate, 1 tablet, Oral, Q12H MABEL  enoxaparin, 40 mg, Subcutaneous, Daily  [START ON 11/7/2024] insulin glargine, 18 Units, Subcutaneous, Daily With Breakfast  insulin lispro, 1-5 Units, Subcutaneous, TID AC  insulin lispro, 1-5 Units, Subcutaneous, HS  insulin lispro, 6 Units, Subcutaneous, TID With Meals  levothyroxine, 25 mcg, Oral, Early Morning  meclizine, 25 mg, Oral, Q8H MABEL  sodium chloride, 1,000 mL, Intravenous, Once      Continuous IV Infusions:     PRN Meds:  acetaminophen, 650 mg, Oral, Q6H PRN - x 1 11/6  influenza vaccine, 0.5 mL, Intramuscular, Prior to discharge  ondansetron, 4 mg, Intravenous, Q6H PRN  pneumococcal 20-selena conj vacc, 0.5 mL, Intramuscular, Prior to discharge    Discharge Plan: likely home     Vital Signs (last 3 days)       Date/Time Temp Pulse Resp BP MAP (mmHg) SpO2 O2 Device Patient Position - Orthostatic VS Gratis Coma Scale Score Pain    11/06/24 1305 -- -- -- -- -- -- -- -- -- 3    11/06/24 07:58:51 -- 91 -- 116/77 90 95 % -- -- -- --    11/06/24 06:02:36 98.2 °F (36.8 °C) 77 18 113/74 87 96 % -- -- -- --    11/05/24 23:57:54 98.3 °F (36.8 °C) 79 12 114/73 87 97 % -- -- -- --    11/05/24 2200 -- -- -- -- -- -- -- -- --  No Pain    11/05/24 19:39:15 98 °F (36.7 °C) 89 18 121/75 90 97 % None (Room air) -- -- No Pain    11/05/24 16:02:29 98 °F (36.7 °C) 80 18 112/72 85 97 % -- -- -- --    11/05/24 0932 -- -- -- -- -- -- -- -- -- 8    11/05/24 07:32:38 -- 95 18 115/70 85 97 % -- -- -- --    11/05/24 0300 -- 86 -- -- -- 96 % -- -- -- --    11/05/24 0100 -- 81 -- -- -- 96 % -- -- -- --    11/04/24 22:14:35 97.5 °F (36.4 °C) 90 14 123/74 90 95 % -- -- -- --    11/04/24 2030 -- -- -- -- -- -- -- -- -- No Pain    11/04/24 18:35:41 -- 104 -- 114/69 84 97 % -- -- -- --    11/04/24 18:33:55 -- 97 -- 112/68 83 94 % -- -- -- --    11/04/24 18:32:45 -- 94 -- 116/69 85 97 % -- -- -- --    11/04/24 1525 -- -- -- -- -- -- -- -- -- No Pain    11/04/24 14:58:21 97.5 °F (36.4 °C) 92 16 120/69 86 96 % None (Room air) Lying -- --    11/04/24 0900 -- -- -- -- -- -- -- -- -- No Pain    11/04/24 0737 98.2 °F (36.8 °C) 81 18 118/68 85 98 % None (Room air) Lying -- --    11/04/24 03:59:07 98 °F (36.7 °C) 79 18 115/65 82 97 % -- -- -- --    11/03/24 23:15:06 97.4 °F (36.3 °C) 101 16 127/86 100 97 % -- -- -- --    11/03/24 2253 -- -- -- -- -- -- -- -- -- 6    11/03/24 2200 -- 92 -- 149/85 111 99 % -- -- -- --    11/03/24 2130 -- 94 -- 140/80 103 98 % -- -- -- --    11/03/24 2100 -- 94 20 133/86 100 97 % None (Room air) Lying -- --    11/03/24 2045 -- 92 -- 128/71 94 98 % -- -- -- --    11/03/24 2030 -- 94 -- 130/72 95 98 % -- -- -- --    11/03/24 2015 -- 98 -- 142/78 103 98 % -- -- -- --    11/03/24 2000 -- 98 -- 135/76 100 99 % -- -- -- --    11/03/24 1951 -- -- -- -- -- -- -- -- 15 6    11/03/24 1905 -- -- -- -- -- -- -- -- -- 8    11/03/24 1904 98.7 °F (37.1 °C) 100 20 140/76 -- 100 % None (Room air) Sitting -- --          Weight (last 2 days)       None            Pertinent Labs/Diagnostic Results:   Radiology:  CTA head and neck with and without contrast   Final Interpretation by Tor Grant MD (11/03 2214)      1.  No acute intracranial or angiographic  abnormality   2.  Sinus disease                  Workstation performed: [id number]           Cardiology:  No orders to display     GI:  No orders to display           Results from last 7 days   Lab Units 11/06/24  0607 11/05/24  0501 11/04/24 0627 11/03/24 1944   WBC Thousand/uL 5.04 8.03 9.53 10.89*   HEMOGLOBIN g/dL 13.0 13.3 12.6 14.7   HEMATOCRIT % 38.3 38.6 36.6 41.8   PLATELETS Thousands/uL 199 209 218 232   TOTAL NEUT ABS Thousands/µL  --   --   --  9.08*         Results from last 7 days   Lab Units 11/06/24  0607 11/05/24  0501 11/04/24  0627 11/04/24  0018 11/03/24  2207   SODIUM mmol/L 136 140 136 133* 129*   POTASSIUM mmol/L 3.6 3.1* 3.7 3.3* 4.0   CHLORIDE mmol/L 112* 114* 110* 109* 102   CO2 mmol/L 16* 18* 19* 15* 16*   ANION GAP mmol/L 8 8 7 9 11   BUN mg/dL 16 18 12 14 15   CREATININE mg/dL 0.60 0.56* 0.51* 0.58* 0.69   EGFR ml/min/1.73sq m 111 114 118 113 106   CALCIUM mg/dL 8.8 8.6 8.1* 8.2* 8.1*   MAGNESIUM mg/dL  --   --  2.4  --   --    PHOSPHORUS mg/dL  --   --  2.9  --   --      Results from last 7 days   Lab Units 11/03/24 1944   AST U/L 8*   ALT U/L 9   ALK PHOS U/L 70   TOTAL PROTEIN g/dL 7.9   ALBUMIN g/dL 4.4   TOTAL BILIRUBIN mg/dL 0.82     Results from last 7 days   Lab Units 11/06/24  1105 11/06/24  0700 11/06/24  0213 11/05/24  2122 11/05/24  1547 11/05/24  1204 11/05/24  0933 11/05/24  0706 11/05/24  0505 11/05/24  0316 11/05/24  0051 11/04/24  2312   POC GLUCOSE mg/dl 268* 204* 260* 287* 237* 351* 337* 104 116 117 131 122     Results from last 7 days   Lab Units 11/06/24  0607 11/05/24  0501 11/04/24  0627 11/04/24  0018 11/03/24 2207 11/03/24 1944   GLUCOSE RANDOM mg/dL 216* 107 100 250* 422* 517*         Results from last 7 days   Lab Units 11/03/24 1944   HEMOGLOBIN A1C % 11.0*   EAG mg/dl 269     Beta- Hydroxybutyrate   Date Value Ref Range Status   11/03/2024 1.91 (H) 0.02 - 0.27 mmol/L Final   10/27/2024 1.24 (H) 0.02 - 0.27 mmol/L Final   04/24/2024 0.13 0.02 - 0.27  mmol/L Final          Results from last 7 days   Lab Units 11/03/24 2039   PH VINAY  7.364   PCO2 VINAY mm Hg 26.7*   PO2 VINAY mm Hg 39.7   HCO3 VINAY mmol/L 14.9*   BASE EXC VINAY mmol/L -8.8   O2 CONTENT VINAY ml/dL 14.1   O2 HGB, VENOUS % 73.6             Results from last 7 days   Lab Units 11/04/24  0018 11/03/24  2148 11/03/24  1944   HS TNI 0HR ng/L  --   --  <2   HS TNI 2HR ng/L  --  <2  --    HS TNI 4HR ng/L 3  --   --    HSTNI D4 ng/L >1  --   --              Results from last 7 days   Lab Units 11/03/24  2207   TSH 3RD GENERATON uIU/mL 12.089*       Results from last 7 days   Lab Units 11/04/24  0354   OSMO UR mmol/     Results from last 7 days   Lab Units 11/04/24  0354   CLARITY UA  Slightly Cloudy   COLOR UA  Colorless   SPEC GRAV UA  1.015   PH UA  6.0   GLUCOSE UA mg/dl 1000 (1%)*   KETONES UA mg/dl 40 (2+)*   BLOOD UA  Small*   PROTEIN UA mg/dl Trace*   NITRITE UA  Negative   BILIRUBIN UA  Negative   UROBILINOGEN UA (BE) mg/dl <2.0   LEUKOCYTES UA  Large*   WBC UA /hpf Innumerable*   RBC UA /hpf None Seen   BACTERIA UA /hpf Moderate*   EPITHELIAL CELLS WET PREP /hpf Occasional   SODIUM UR  58     Results from last 7 days   Lab Units 11/04/24  0354   URINE CULTURE  >100,000 cfu/ml     Network Utilization Review Department  ATTENTION: Please call with any questions or concerns to [phone] and carefully listen to the prompts so that you are directed to the right person. All voicemails are confidential.   For Discharge needs, contact Care Management DC Support Team at [phone] opt. 2  Send all requests for admission clinical reviews, approved or denied determinations and any other requests to dedicated fax number below belonging to the campus where the patient is receiving treatment. List of dedicated fax numbers for the Facilities:  FACILITY NAME UR FAX NUMBER   ADMISSION DENIALS (Administrative/Medical Necessity) [phone]   DISCHARGE SUPPORT TEAM (NETWORK) [phone]   [facility]ILD [facility]  (Maternity/NICU/Pediatrics) [phone]   [facility] [phone]   [facility] [phone]   [facility] [phone]   [facility] [phone]   [facility] [phone]   [facility] [phone]   [facility] [phone]   [facility] [phone]   [facility] [phone]   [facility] [phone]   [facility] [phone]   [facility] [phone]

## 2024-11-06 NOTE — TREATMENT PLAN
Chart, blood sugars reviewed.       POC Glucose (mg/dl)   Date Value   11/06/2024 268 (H)   11/06/2024 204 (H)   11/06/2024 260 (H)   11/05/2024 287 (H)   11/05/2024 237 (H)   11/05/2024 351 (H)   11/05/2024 337 (H)   11/05/2024 104   11/05/2024 116   11/05/2024 117     Recommend the following for now  -Increase Lantus to 18 units subcutaneously daily  -Increase Humalog to 6 units with meals 3 times a day  -Continue sliding scale insulin  -DEE antibody, islet antibody-results pending  Will continue to follow.     Jimena Gonzalez MD  Endocrinology

## 2024-11-06 NOTE — ASSESSMENT & PLAN NOTE
Lab Results   Component Value Date    HGBA1C 11.0 (H) 11/03/2024       Recent Labs     11/05/24  2122 11/06/24  0213 11/06/24  0700 11/06/24  1105   POCGLU 287* 260* 204* 268*       Blood Sugar Average: Last 72 hrs:  (P) 211.8203393663243504    Poorly controlled with A1c 11%  -Discontinue insulin drip  -Start Lantus 15 units subcutaneously daily, first dose now  - start Humalog 5 units with meals 3 times a day  -Start sliding scale insulin  Will continue to follow

## 2024-11-06 NOTE — PROGRESS NOTES
"Progress Note - Hospitalist   Name: Gabby Olivera 44 y.o. female I MRN: [id number]  Unit/Bed#: [address] [place] [address] [address]  Date of Admission: 11/3/2024   Date of Service: 11/6/2024 I Hospital Day: 1    Assessment & Plan  Uncontrolled diabetes mellitus with hyperglycemia (HCC)  Lab Results   Component Value Date    HGBA1C 11.0 (H) 11/03/2024     Recent Labs     11/05/24  2122 11/06/24  0213 11/06/24  0700 11/06/24  1105   POCGLU 287* 260* 204* 268*     Blood Sugar Average: Last 72 hrs:  (P) 211.6697132220477204  Noted with sugars in 400s-500s on admission, low bicarb, normal AG, mildly elevated beta-hydroxybutyrate  On metformin 500 mg bid, reports she has not been taking it because it \"doesn't help\"  Started on non DKA insulin gtt with improvement  Endocrinology consulted  Drip discontinued and started on Lantus 14 units daily and humalog 5 units tid with SSI; increased Lantus to 18 units daily and Humalog 6 units with meals and continue sliding scale on 11/6  Diabetic diet  Nutrition consulted for diabetic diet teaching   Dizziness  Patient presented to ED with dizziness starting day of admission that is worse with head turning and position changes associated with nausea, vomiting, and difficulty ambulating. Patient reports headache as well. She also reports hearing loss and a \"noise\" in her left ear over the past week.  Patient seen in [place] ED 10/27 for viral syndrome and again on 10/31 for decreased hearing and tinnitus in left ear and was discharged home with referral to ENT.  CTA head/neck negative for acute intracranial abnormality, did show sinus disease  Suspect peripheral vertigo given tinnitus, decreased hearing and recent viral illness  Started scheduled meclizine 12.5 mg every 8 hours, patient reports continues with dizziness will increase to 25 mg  Continue augmentin  Zofran prn  Check orthostatic vitals  ENT was consulted  PT/OT consulted, recommending minimal resources at this " time  Hypothyroid  Previously on levothyroxine, has not been taking  TSH elevated, T4 within normal limits  Endocrinology following  Restarted on levothyroxine 25 mcg daily  Will need repeat labs in 6-8 weeks  Follow up with PCP/endocrinology outpatient  Abnormal urinalysis  UA positive for leukocytes, WBC  Patient asymptomatic  No fever or leukocytosis  Urine culture showed mixed contaminants  Received dose of rocephin, now on augmentin for sinusitis  Hyponatremia  Likely pseudohyponatremia due to hyperglycemia  Improving  Daily bmp  Tinnitus of left ear  As outlined below    VTE Pharmacologic Prophylaxis:   Moderate Risk (Score 3-4) - Pharmacological DVT Prophylaxis Ordered: enoxaparin (Lovenox).    Mobility:   Basic Mobility Inpatient Raw Score: 19  JH-HLM Goal: 6: Walk 10 steps or more  JH-HLM Achieved: 6: Walk 10 steps or more  JH-HLM Goal achieved. Continue to encourage appropriate mobility.    Patient Centered Rounds: I performed bedside rounds with nursing staff today.   Discussions with Specialists or Other Care Team Provider: multidisciplinary team     Education and Discussions with Family / Patient:  patient indicated she would call sister with update.     Current Length of Stay: 1 day(s)  Current Patient Status: Inpatient   Certification Statement: The patient will continue to require additional inpatient hospital stay due to continued dizziness; increasing meclizine, check orthostatic BP, adjusting insulin, monitoring blood sugar  Discharge Plan: Anticipate discharge in 24-48 hrs to home.    Code Status: Level 1 - Full Code    Subjective   Patient seen sitting up in bed, reports that she continues with dizziness.  She is Thai-speaking so usage of  via iPad is utilized.  No nausea or vomiting, was able to have breakfast this morning.  Is still using walker for ambulation as she does not feel steady due to the dizziness.  Is reporting that she feels like she is constipated and would like  something to aid her to go.    Objective :  Temp:  [98 °F (36.7 °C)-98.6 °F (37 °C)] 98.6 °F (37 °C)  HR:  [77-91] 91  BP: (112-121)/(72-77) 118/77  Resp:  [12-18] 18  SpO2:  [95 %-97 %] 96 %  O2 Device: None (Room air)    Body mass index is 23.59 kg/m².     Input and Output Summary (last 24 hours):   No intake or output data in the 24 hours ending 11/06/24 1535    Physical Exam  Vitals and nursing note reviewed.   Constitutional:       General: She is not in acute distress.  HENT:      Head: Normocephalic.      Nose: Nose normal.      Mouth/Throat:      Mouth: Mucous membranes are moist.   Eyes:      Extraocular Movements: Extraocular movements intact.      Conjunctiva/sclera: Conjunctivae normal.   Cardiovascular:      Rate and Rhythm: Normal rate and regular rhythm.   Pulmonary:      Effort: Pulmonary effort is normal.      Breath sounds: Normal breath sounds.   Abdominal:      General: Bowel sounds are normal.      Palpations: Abdomen is soft.      Tenderness: There is no abdominal tenderness.   Genitourinary:     Comments: Voiding spontaneously does admit to some constipation   Musculoskeletal:      Cervical back: Normal range of motion.      Comments: Ambulating with walker due to dizziness    Skin:     General: Skin is warm and dry.      Capillary Refill: Capillary refill takes less than 2 seconds.   Neurological:      Mental Status: She is alert.      Comments: Dizziness; no focal deficit   Psychiatric:         Mood and Affect: Mood normal.         Behavior: Behavior normal.           Lines/Drains:              Lab Results: I have reviewed the following results:   Results from last 7 days   Lab Units 11/06/24  0607 11/04/24  0627 11/03/24  1944   WBC Thousand/uL 5.04   < > 10.89*   HEMOGLOBIN g/dL 13.0   < > 14.7   HEMATOCRIT % 38.3   < > 41.8   PLATELETS Thousands/uL 199   < > 232   SEGS PCT %  --   --  84*   LYMPHO PCT %  --   --  6*   MONO PCT %  --   --  9   EOS PCT %  --   --  0    < > = values in this  interval not displayed.     Results from last 7 days   Lab Units 11/06/24  0607 11/03/24  2207 11/03/24  1944   SODIUM mmol/L 136   < > 130*   POTASSIUM mmol/L 3.6   < > 3.6   CHLORIDE mmol/L 112*   < > 100   CO2 mmol/L 16*   < > 17*   BUN mg/dL 16   < > 16   CREATININE mg/dL 0.60   < > 0.77   ANION GAP mmol/L 8   < > 13   CALCIUM mg/dL 8.8   < > 8.8   ALBUMIN g/dL  --   --  4.4   TOTAL BILIRUBIN mg/dL  --   --  0.82   ALK PHOS U/L  --   --  70   ALT U/L  --   --  9   AST U/L  --   --  8*   GLUCOSE RANDOM mg/dL 216*   < > 517*    < > = values in this interval not displayed.         Results from last 7 days   Lab Units 11/06/24  1105 11/06/24  0700 11/06/24  0213 11/05/24  2122 11/05/24  1547 11/05/24  1204 11/05/24  0933 11/05/24  0706 11/05/24  0505 11/05/24  0316 11/05/24  0051 11/04/24  2312   POC GLUCOSE mg/dl 268* 204* 260* 287* 237* 351* 337* 104 116 117 131 122     Results from last 7 days   Lab Units 11/03/24  1944   HEMOGLOBIN A1C % 11.0*           Recent Cultures (last 7 days):   Results from last 7 days   Lab Units 11/04/24  0354   URINE CULTURE  >100,000 cfu/ml       Imaging Results Review: No pertinent imaging studies reviewed.  Other Study Results Review: No additional pertinent studies reviewed.    Last 24 Hours Medication List:     Current Facility-Administered Medications:     acetaminophen (TYLENOL) tablet 650 mg, Q6H PRN    amoxicillin-clavulanate (AUGMENTIN) 875-125 mg per tablet 1 tablet, Q12H MABEL    enoxaparin (LOVENOX) subcutaneous injection 40 mg, Daily    influenza vaccine (PF) (Fluarix) IM injection 0.5 mL, Prior to discharge    [START ON 11/7/2024] insulin glargine (LANTUS) subcutaneous injection 18 Units 0.18 mL, Daily With Breakfast    insulin lispro (HumALOG/ADMELOG) 100 units/mL subcutaneous injection 1-5 Units, TID AC **AND** Fingerstick Glucose (POCT), TID AC    insulin lispro (HumALOG/ADMELOG) 100 units/mL subcutaneous injection 1-5 Units, HS    insulin lispro (HumALOG/ADMELOG) 100  units/mL subcutaneous injection 6 Units, TID With Meals    levothyroxine tablet 25 mcg, Early Morning    meclizine (ANTIVERT) tablet 25 mg, Q8H MABEL    ondansetron (ZOFRAN) injection 4 mg, Q6H PRN    pneumococcal 20-selena conj vacc (PREVNAR 20) IM Injection 0.5 mL, Prior to discharge    sodium chloride 0.9 % bolus 1,000 mL, Once    Administrative Statements   Today, Patient Was Seen By: SHARI Antunez  I have spent a total time of greater than 45 minutes in caring for this patient on the day of the visit/encounter including Diagnostic results, Counseling / Coordination of care, Documenting in the medical record, Reviewing / ordering tests, medicine, procedures  , Obtaining or reviewing history  , and Communicating with other healthcare professionals .    **Please Note: This note may have been constructed using a voice recognition system.**

## 2024-11-06 NOTE — ASSESSMENT & PLAN NOTE
UA positive for leukocytes, WBC  Patient asymptomatic  No fever or leukocytosis  Urine culture showed mixed contaminants  Received dose of rocephin, now on augmentin for sinusitis

## 2024-11-06 NOTE — ASSESSMENT & PLAN NOTE
"Patient presented to ED with dizziness starting day of admission that is worse with head turning and position changes associated with nausea, vomiting, and difficulty ambulating. Patient reports headache as well. She also reports hearing loss and a \"noise\" in her left ear over the past week.  Patient seen in [place] ED 10/27 for viral syndrome and again on 10/31 for decreased hearing and tinnitus in left ear and was discharged home with referral to ENT.  CTA head/neck negative for acute intracranial abnormality, did show sinus disease  Suspect peripheral vertigo given tinnitus, decreased hearing and recent viral illness  Started scheduled meclizine 12.5 mg every 8 hours, patient reports continues with dizziness will increase to 25 mg  Continue augmentin  Zofran prn  Check orthostatic vitals  ENT was consulted  PT/OT consulted, recommending minimal resources at this time  "

## 2024-11-06 NOTE — PROGRESS NOTES
Progress Note - Endocrinology   Name: Gabby Olivera 44 y.o. female I MRN: [id number]  Unit/Bed#: 2 [place] [address] A  Date of Admission: 11/3/2024   Date of Service: 11/6/2024 I Hospital Day: 1     Assessment & Plan  Uncontrolled diabetes mellitus with hyperglycemia (HCC)  Lab Results   Component Value Date    HGBA1C 11.0 (H) 11/03/2024       Recent Labs     11/05/24  2122 11/06/24  0213 11/06/24  0700 11/06/24  1105   POCGLU 287* 260* 204* 268*       Blood Sugar Average: Last 72 hrs:  (P) 211.0125945462825007    Poorly controlled with A1c 11%  -Discontinue insulin drip  -Start Lantus 15 units subcutaneously daily, first dose now  - start Humalog 5 units with meals 3 times a day  -Start sliding scale insulin  Will continue to follow    Hypothyroid  Continue levothyroxine 25 mcg orally daily  Repeat thyroid function test in 6 to 8 weeks    Tinnitus of left ear    Dizziness  Care per primary team/ENT

## 2024-11-06 NOTE — ASSESSMENT & PLAN NOTE
"Lab Results   Component Value Date    HGBA1C 11.0 (H) 11/03/2024     Recent Labs     11/05/24  2122 11/06/24  0213 11/06/24  0700 11/06/24  1105   POCGLU 287* 260* 204* 268*     Blood Sugar Average: Last 72 hrs:  (P) 211.1661507583682164  Noted with sugars in 400s-500s on admission, low bicarb, normal AG, mildly elevated beta-hydroxybutyrate  On metformin 500 mg bid, reports she has not been taking it because it \"doesn't help\"  Started on non DKA insulin gtt with improvement  Endocrinology consulted  Drip discontinued and started on Lantus 14 units daily and humalog 5 units tid with SSI; increased Lantus to 18 units daily and Humalog 6 units with meals and continue sliding scale on 11/6  Diabetic diet  Nutrition consulted for diabetic diet teaching   "

## 2024-11-07 LAB
ANION GAP SERPL CALCULATED.3IONS-SCNC: 8 MMOL/L (ref 4–13)
ATRIAL RATE: 101 BPM
BUN SERPL-MCNC: 16 MG/DL (ref 5–25)
CALCIUM SERPL-MCNC: 9.3 MG/DL (ref 8.4–10.2)
CHLORIDE SERPL-SCNC: 110 MMOL/L (ref 96–108)
CO2 SERPL-SCNC: 18 MMOL/L (ref 21–32)
CREAT SERPL-MCNC: 0.58 MG/DL (ref 0.6–1.3)
ERYTHROCYTE [DISTWIDTH] IN BLOOD BY AUTOMATED COUNT: 11.9 % (ref 11.6–15.1)
GFR SERPL CREATININE-BSD FRML MDRD: 113 ML/MIN/1.73SQ M
GLUCOSE SERPL-MCNC: 164 MG/DL (ref 65–140)
GLUCOSE SERPL-MCNC: 181 MG/DL (ref 65–140)
GLUCOSE SERPL-MCNC: 187 MG/DL (ref 65–140)
GLUCOSE SERPL-MCNC: 200 MG/DL (ref 65–140)
GLUCOSE SERPL-MCNC: 205 MG/DL (ref 65–140)
HCT VFR BLD AUTO: 39.5 % (ref 34.8–46.1)
HGB BLD-MCNC: 13.6 G/DL (ref 11.5–15.4)
MCH RBC QN AUTO: 30.2 PG (ref 26.8–34.3)
MCHC RBC AUTO-ENTMCNC: 34.4 G/DL (ref 31.4–37.4)
MCV RBC AUTO: 88 FL (ref 82–98)
P AXIS: 28 DEGREES
PLATELET # BLD AUTO: 223 THOUSANDS/UL (ref 149–390)
PMV BLD AUTO: 9.8 FL (ref 8.9–12.7)
POTASSIUM SERPL-SCNC: 3.7 MMOL/L (ref 3.5–5.3)
PR INTERVAL: 150 MS
QRS AXIS: 28 DEGREES
QRSD INTERVAL: 88 MS
QT INTERVAL: 356 MS
QTC INTERVAL: 461 MS
RBC # BLD AUTO: 4.5 MILLION/UL (ref 3.81–5.12)
SODIUM SERPL-SCNC: 136 MMOL/L (ref 135–147)
T WAVE AXIS: 36 DEGREES
VENTRICULAR RATE: 101 BPM
WBC # BLD AUTO: 5.4 THOUSAND/UL (ref 4.31–10.16)

## 2024-11-07 PROCEDURE — 85027 COMPLETE CBC AUTOMATED: CPT | Performed by: NURSE PRACTITIONER

## 2024-11-07 PROCEDURE — 99232 SBSQ HOSP IP/OBS MODERATE 35: CPT | Performed by: NURSE PRACTITIONER

## 2024-11-07 PROCEDURE — 97535 SELF CARE MNGMENT TRAINING: CPT

## 2024-11-07 PROCEDURE — 97530 THERAPEUTIC ACTIVITIES: CPT

## 2024-11-07 PROCEDURE — 93010 ELECTROCARDIOGRAM REPORT: CPT | Performed by: INTERNAL MEDICINE

## 2024-11-07 PROCEDURE — 80048 BASIC METABOLIC PNL TOTAL CA: CPT | Performed by: NURSE PRACTITIONER

## 2024-11-07 PROCEDURE — 82948 REAGENT STRIP/BLOOD GLUCOSE: CPT

## 2024-11-07 RX ADMIN — MECLIZINE HYDROCHLORIDE 25 MG: 25 TABLET ORAL at 13:13

## 2024-11-07 RX ADMIN — INSULIN LISPRO 1 UNITS: 100 INJECTION, SOLUTION INTRAVENOUS; SUBCUTANEOUS at 08:05

## 2024-11-07 RX ADMIN — ENOXAPARIN SODIUM 40 MG: 40 INJECTION SUBCUTANEOUS at 08:14

## 2024-11-07 RX ADMIN — AMOXICILLIN AND CLAVULANATE POTASSIUM 1 TABLET: 875; 125 TABLET, COATED ORAL at 08:14

## 2024-11-07 RX ADMIN — SENNOSIDES 8.6 MG: 8.6 TABLET, FILM COATED ORAL at 22:02

## 2024-11-07 RX ADMIN — MECLIZINE HYDROCHLORIDE 25 MG: 25 TABLET ORAL at 06:29

## 2024-11-07 RX ADMIN — INSULIN LISPRO 1 UNITS: 100 INJECTION, SOLUTION INTRAVENOUS; SUBCUTANEOUS at 11:33

## 2024-11-07 RX ADMIN — INSULIN LISPRO 6 UNITS: 100 INJECTION, SOLUTION INTRAVENOUS; SUBCUTANEOUS at 16:17

## 2024-11-07 RX ADMIN — INSULIN LISPRO 1 UNITS: 100 INJECTION, SOLUTION INTRAVENOUS; SUBCUTANEOUS at 22:02

## 2024-11-07 RX ADMIN — INSULIN GLARGINE 18 UNITS: 100 INJECTION, SOLUTION SUBCUTANEOUS at 08:14

## 2024-11-07 RX ADMIN — INSULIN LISPRO 1 UNITS: 100 INJECTION, SOLUTION INTRAVENOUS; SUBCUTANEOUS at 16:17

## 2024-11-07 RX ADMIN — LEVOTHYROXINE SODIUM 25 MCG: 25 TABLET ORAL at 06:29

## 2024-11-07 RX ADMIN — MECLIZINE HYDROCHLORIDE 25 MG: 25 TABLET ORAL at 22:02

## 2024-11-07 RX ADMIN — INSULIN LISPRO 6 UNITS: 100 INJECTION, SOLUTION INTRAVENOUS; SUBCUTANEOUS at 11:33

## 2024-11-07 RX ADMIN — AMOXICILLIN AND CLAVULANATE POTASSIUM 1 TABLET: 875; 125 TABLET, COATED ORAL at 22:00

## 2024-11-07 RX ADMIN — INSULIN LISPRO 6 UNITS: 100 INJECTION, SOLUTION INTRAVENOUS; SUBCUTANEOUS at 08:05

## 2024-11-07 NOTE — ASSESSMENT & PLAN NOTE
"Patient presented to ED with dizziness starting day of admission that is worse with head turning and position changes associated with nausea, vomiting, and difficulty ambulating. Patient reports headache as well. She also reports hearing loss and a \"noise\" in her left ear over the past week.  Patient seen in [place] ED 10/27 for viral syndrome and again on 10/31 for decreased hearing and tinnitus in left ear and was discharged home with referral to ENT.  CTA head/neck negative for acute intracranial abnormality, did show sinus disease  Suspect peripheral vertigo given tinnitus, decreased hearing and recent viral illness  Continue with Meclizine 25 mg every 8 hours; patient was able to ambulate better 11/7; able to walk without walker, however gait still very slow, unsteady  Continue augmentin  Zofran prn  Check orthostatic vitals  ENT was consulted, suspect labyrinthitis and sinusitis  PT/OT consulted, recommending minimal resources at this time  "

## 2024-11-07 NOTE — PHYSICAL THERAPY NOTE
PT TREATMENT     11/07/24 0908   PT Last Visit   PT Visit Date 11/07/24   Note Type   Note Type Treatment   Pain Assessment   Pain Assessment Tool 0-10   Pain Score No Pain   Restrictions/Precautions   Other Precautions Fall Risk  ([place]ymraes speaking only)   General   Chart Reviewed Yes   Additional Pertinent History ENT assessment notes L labyrinthitis is the likely cause of pt's L ear hearing loss and imbalance.  Via cyracom  pt reports that she still has significant pressure on the L side of her head and is hearing strange sounds in her L ear.  Pt does note that she was not able to walk at all upon admission and now is able to walk with assist.   Cognition   Overall Cognitive Status WFL   Arousal/Participation Alert;Cooperative   Following Commands Follows all commands and directions without difficulty   Subjective   Subjective Pt agreeable to activity   Bed Mobility   Supine to Sit 7  Independent   Sit to Supine 7  Independent   Transfers   Sit to Stand 7  Independent   Stand to Sit 7  Independent   Stand pivot 5  Supervision   Ambulation/Elevation   Gait pattern   (Short step length and narrow KATELIN, decreased veronica.  L step length is shorter than R.)   Gait Assistance   (supervision with occasional min assist)   Additional items Verbal cues  (for increased veronica, wider KATELIN)   Assistive Device   (none)   Distance 300 feet with multiple changes in direction, change in gait speed   Balance   Static Sitting Good   Static Standing Fair +   Ambulatory Fair -   Activity Tolerance   Activity Tolerance Patient tolerated treatment well;Patient limited by fatigue   Assessment   Prognosis Good   Problem List Impaired balance;Decreased mobility   Assessment Pt demonstrates improving dizziness and ability to ambulate however still demonstrates  an abnormal gait as described above. Pt was able to ambulate 300 feet with supervision/occasional min assist. Anticipate with continued PT/mobilization by staff, pt will  progress to be independently ambulatory soon.  Plan to see pt in AM and assess stair climbing and evaluate pt for use of cane vs walker  if needed.    The patient's AM-PAC Basic Mobility Inpatient Short Form Raw Score is 21. A Raw score of greater than 16 suggests the patient may benefit from discharge to home. Please also refer to the recommendation of the Physical Therapist for safe discharge planning.         Plan   Treatment/Interventions Therapeutic exercise;Functional transfer training;Gait training;Spoke to advanced practitioner;Patient/family training;Elevations   Progress Progressing toward goals   PT Frequency 4-6x/wk   Discharge Recommendation   Rehab Resource Intensity Level, PT III (Minimum Resource Intensity)   Equipment Recommended   (Possible trial of cane or walker prior to DC if needed.)   AM-PAC Basic Mobility Inpatient   Turning in Flat Bed Without Bedrails 4   Lying on Back to Sitting on Edge of Flat Bed Without Bedrails 4   Moving Bed to Chair 3   Standing Up From Chair Using Arms 4   Walk in Room 3   Climb 3-5 Stairs With Railing 3   Basic Mobility Inpatient Raw Score 21   Basic Mobility Standardized Score 45.55   [facility] Highest Level Of Mobility   -HLM Goal 6: Walk 10 steps or more   -HLM Achieved 8: Walk 250 feet ot more   End of Consult   Patient Position at End of Consult All needs within reach;Supine   Licensure   [place] License Number  Kacy Lobo PT [id number]

## 2024-11-07 NOTE — UTILIZATION REVIEW
Continued Stay Review    Date: 11-7-24                           Current Patient Class: Inpatient Current Level of Care: inpatient   The patient will continue to require additional inpatient hospital stay due to insulin adjustment, PT and OT continue to work with patient  Discharge Plan: Anticipate discharge in 24-48 hrs to home.    HPI:44 y.o. female initially admitted on 11-5-24      Assessment/Plan:    Ambulating today very slowly and unsteadily without roller walker.   She reports that she feels the dizziness is better with the medication however she still has the ringing in the left ear and a sense of fullness in that region. Denies any nausea or vomiting, no fever or chills.  PT/OT treating functional deficits.  Minimum resource intensity required.          Scheduled Medications:    amoxicillin-clavulanate, 1 tablet, Oral, Q12H MABEL  enoxaparin, 40 mg, Subcutaneous, Daily  insulin glargine, 18 Units, Subcutaneous, Daily With Breakfast  insulin lispro, 1-5 Units, Subcutaneous, TID AC  insulin lispro, 1-5 Units, Subcutaneous, HS  insulin lispro, 6 Units, Subcutaneous, TID With Meals  levothyroxine, 25 mcg, Oral, Early Morning  meclizine, 25 mg, Oral, Q8H MABEL  senna, 1 tablet, Oral, HS  sodium chloride, 1,000 mL, Intravenous, Once      Continuous IV Infusions:     PRN Meds:  acetaminophen, 650 mg, Oral, Q6H PRN  influenza vaccine, 0.5 mL, Intramuscular, Prior to discharge  ondansetron, 4 mg, Intravenous, Q6H PRN  pneumococcal 20-selena conj vacc, 0.5 mL, Intramuscular, Prior to discharge      Discharge Plan: to be determined      Vital Signs (last 3 days)       Date/Time Temp Pulse Resp BP MAP  SpO2 O2 Device Ashley Coma Scale Score Pain    11/07/24 1045 -- -- -- -- -- -- -- -- No Pain    11/07/24 0908 -- -- -- -- -- -- -- -- No Pain    11/07/24 08:04:18 98.7 °F (37.1 °C) 84 18 115/77 90 96 % -- -- --    11/07/24 0800 -- -- -- -- -- -- -- 15 No Pain    11/06/24 22:35:04 97.9 °F (36.6 °C) 88 17 117/77 90 91 %  None (Room air) -- --    11/06/24 2012 -- -- -- -- -- -- -- 15 --    11/06/24 20:11:33 98.5 °F (36.9 °C) 92 18 119/77 91 96 % None (Room air) -- No Pain    11/06/24 14:49:49 98.6 °F (37 °C) 91 -- 118/77 91 96 % -- -- --    11/06/24 1305 -- -- -- -- -- -- -- -- 3    11/06/24 07:58:51 -- 91 -- 116/77 90 95 % -- -- --    11/06/24 06:02:36 98.2 °F (36.8 °C) 77 18 113/74 87 96 % -- -- --    11/05/24 23:57:54 98.3 °F (36.8 °C) 79 12 114/73 87 97 % -- -- --    11/05/24 2200 -- -- -- -- -- -- -- -- No Pain    11/05/24 19:39:15 98 °F (36.7 °C) 89 18 121/75 90 97 % None (Room air) -- No Pain    11/05/24 16:02:29 98 °F (36.7 °C) 80 18 112/72 85 97 % -- -- --    11/05/24 0932 -- -- -- -- -- -- -- -- 8    11/05/24 07:32:38 -- 95 18 115/70 85 97 % -- -- --    11/05/24 0300 -- 86 -- -- -- 96 % -- -- --    11/05/24 0100 -- 81 -- -- -- 96 % -- -- --    11/04/24 22:14:35 97.5 °F (36.4 °C) 90 14 123/74 90 95 % -- -- --    11/04/24 2030 -- -- -- -- -- -- -- -- No Pain    11/04/24 18:35:41 -- 104 -- 114/69 84 97 % -- -- --    11/04/24 18:33:55 -- 97 -- 112/68 83 94 % -- -- --    11/04/24 18:32:45 -- 94 -- 116/69 85 97 % -- -- --    11/04/24 1525 -- -- -- -- -- -- -- -- No Pain    11/04/24 14:58:21 97.5 °F (36.4 °C) 92 16 120/69 86 96 % None (Room air) -- --    11/04/24 0900 -- -- -- -- -- -- -- -- No Pain    11/04/24 0737 98.2 °F (36.8 °C) 81 18 118/68 85 98 % None (Room air) -- --    11/04/24 03:59:07 98 °F (36.7 °C) 79 18 115/65 82 97 % -- -- --          Weight (last 2 days)       Date/Time    11/06/24 20:11:33    Comment rows:    OBSERV: alert resting in bed at 11/06/24 2011            Pertinent Labs/Diagnostic Results:   Radiology:  CTA head and neck with and without contrast   Final  (11/03 2214)      1.  No acute intracranial or angiographic abnormality   2.  Sinus disease        Cardiology:  ECG 12 lead   Final  (11/07 1010)   Sinus tachycardia   Otherwise normal ECG   When compared with ECG of 27-OCT-2024 09:22,   No significant  change was found        GI:  No orders to display           Results from last 7 days   Lab Units 11/07/24  0628 11/06/24  0607 11/05/24  0501 11/04/24  0627 11/03/24 1944   WBC Thousand/uL 5.40 5.04 8.03 9.53 10.89*   HEMOGLOBIN g/dL 13.6 13.0 13.3 12.6 14.7   HEMATOCRIT % 39.5 38.3 38.6 36.6 41.8   PLATELETS Thousands/uL 223 199 209 218 232   TOTAL NEUT ABS Thousands/µL  --   --   --   --  9.08*         Results from last 7 days   Lab Units 11/07/24  0628 11/06/24  0607 11/05/24  0501 11/04/24  0627 11/04/24  0018   SODIUM mmol/L 136 136 140 136 133*   POTASSIUM mmol/L 3.7 3.6 3.1* 3.7 3.3*   CHLORIDE mmol/L 110* 112* 114* 110* 109*   CO2 mmol/L 18* 16* 18* 19* 15*   ANION GAP mmol/L 8 8 8 7 9   BUN mg/dL 16 16 18 12 14   CREATININE mg/dL 0.58* 0.60 0.56* 0.51* 0.58*   EGFR ml/min/1.73sq m 113 111 114 118 113   CALCIUM mg/dL 9.3 8.8 8.6 8.1* 8.2*   MAGNESIUM mg/dL  --   --   --  2.4  --    PHOSPHORUS mg/dL  --   --   --  2.9  --      Results from last 7 days   Lab Units 11/03/24 1944   AST U/L 8*   ALT U/L 9   ALK PHOS U/L 70   TOTAL PROTEIN g/dL 7.9   ALBUMIN g/dL 4.4   TOTAL BILIRUBIN mg/dL 0.82     Results from last 7 days   Lab Units 11/07/24  1121 11/07/24  0721 11/06/24  2040 11/06/24  1541 11/06/24  1105 11/06/24  0700 11/06/24  0213 11/05/24  2122 11/05/24  1547 11/05/24  1204 11/05/24  0933 11/05/24  0706   POC GLUCOSE mg/dl 200* 187* 178* 260* 268* 204* 260* 287* 237* 351* 337* 104     Results from last 7 days   Lab Units 11/07/24  0628 11/06/24  0607 11/05/24  0501 11/04/24  0627 11/04/24  0018 11/03/24 2207 11/03/24 1944   GLUCOSE RANDOM mg/dL 164* 216* 107 100 250* 422* 517*         Results from last 7 days   Lab Units 11/03/24 1944   HEMOGLOBIN A1C % 11.0*   EAG mg/dl 269     Beta- Hydroxybutyrate   Date Value Ref Range Status   11/03/2024 1.91 (H) 0.02 - 0.27 mmol/L Final   10/27/2024 1.24 (H) 0.02 - 0.27 mmol/L Final   04/24/2024 0.13 0.02 - 0.27 mmol/L Final          Results from last 7 days    Lab Units 11/03/24 2039   PH VINAY  7.364   PCO2 VINAY mm Hg 26.7*   PO2 VINAY mm Hg 39.7   HCO3 VINAY mmol/L 14.9*   BASE EXC VINAY mmol/L -8.8   O2 CONTENT VINAY ml/dL 14.1   O2 HGB, VENOUS % 73.6             Results from last 7 days   Lab Units 11/04/24  0018 11/03/24  2148 11/03/24  1944   HS TNI 0HR ng/L  --   --  <2   HS TNI 2HR ng/L  --  <2  --    HS TNI 4HR ng/L 3  --   --    HSTNI D4 ng/L >1  --   --        Results from last 7 days   Lab Units 11/03/24  2207   TSH 3RD GENERATON uIU/mL 12.089*     Results from last 7 days   Lab Units 11/04/24  0354   OSMO UR mmol/     Results from last 7 days   Lab Units 11/04/24  0354   CLARITY UA  Slightly Cloudy   COLOR UA  Colorless   SPEC GRAV UA  1.015   PH UA  6.0   GLUCOSE UA mg/dl 1000 (1%)*   KETONES UA mg/dl 40 (2+)*   BLOOD UA  Small*   PROTEIN UA mg/dl Trace*   NITRITE UA  Negative   BILIRUBIN UA  Negative   UROBILINOGEN UA (BE) mg/dl <2.0   LEUKOCYTES UA  Large*   WBC UA /hpf Innumerable*   RBC UA /hpf None Seen   BACTERIA UA /hpf Moderate*   EPITHELIAL CELLS WET PREP /hpf Occasional   SODIUM UR  58       Results from last 7 days   Lab Units 11/04/24  0354   URINE CULTURE  >100,000 cfu/ml         Network Utilization Review Department  ATTENTION: Please call with any questions or concerns to [phone] and carefully listen to the prompts so that you are directed to the right person. All voicemails are confidential.   For Discharge needs, contact Care Management DC Support Team at [phone] opt. 2  Send all requests for admission clinical reviews, approved or denied determinations and any other requests to dedicated fax number below belonging to the campus where the patient is receiving treatment. List of dedicated fax numbers for the Facilities:  FACILITY NAME UR FAX NUMBER   ADMISSION DENIALS (Administrative/Medical Necessity) [phone]   DISCHARGE SUPPORT TEAM (NETWORK) [phone]   PARENT CHILD HEALTH (Maternity/NICU/Pediatrics) [phone]   [facility] [phone]   [facility] [phone]   [facility] [phone]   [facility] [phone]   [facility] [phone]   [facility] [phone]   [facility] [phone]   [facility] [phone]   [facility] [phone]   [facility] [phone]   [facility] [phone]   [facility] [phone]

## 2024-11-07 NOTE — OCCUPATIONAL THERAPY NOTE
OT EVALUATION       11/05/24 0932   OT Last Visit   OT Visit Date 11/05/24   Note Type   Note type Evaluation   Pain Assessment   Pain Assessment Tool 0-10   Pain Score 8   Pain Location/Orientation Orientation: Left;Location: Head   Restrictions/Precautions   Other Precautions Chair Alarm;Bed Alarm;Fall Risk  (dizziness, Sinhala speaking)   Home Living   Type of Home House   Home Layout Two level;Bed/bath upstairs   Bathroom Shower/Tub Tub/shower unit   Bathroom Toilet Standard   Home Equipment   (none)   Prior Function   Level of [place] Independent with ADLs;Independent with functional mobility;Independent with IADLS   Lives With Alone   Receives Help From Family   IADLs Independent with driving;Independent with meal prep;Independent with medication management   Comments per chart family assists at times   ADL   Eating Assistance 7  Independent   Grooming Assistance 7  Independent   UB Bathing Assistance 5  Supervision/Setup   LB Bathing Assistance 4  Minimal Assistance   UB Dressing Assistance 5  Supervision/Setup   LB Dressing Assistance 4  Minimal Assistance   Toileting Assistance  4  Minimal Assistance   Bed Mobility   Supine to Sit 5  Supervision   Sit to Supine 5  Supervision   Transfers   Sit to Stand 4  Minimal assistance   Stand to Sit 4  Minimal assistance   Toilet transfer 4  Minimal assistance   Additional items Standard toilet   Functional Mobility   Functional Mobility 4  Minimal assistance   Additional Comments to and from bathroom with RW   Balance   Static Sitting Fair   Dynamic Sitting Fair   Static Standing Fair -   Dynamic Standing Fair -   Activity Tolerance   Activity Tolerance Patient limited by fatigue   RUE Assessment   RUE Assessment WFL   LUE Assessment   LUE Assessment WFL   Cognition   Overall Cognitive Status WFL   Arousal/Participation Cooperative   Attention Within functional limits   Orientation Level Oriented X4   Following Commands Follows multistep commands with increased  time or repetition   Comments used ipad interpretor   Assessment   Limitation Decreased ADL status;Decreased Safe judgement during ADL;Decreased endurance;Decreased self-care trans;Decreased high-level ADLs  (decreased balance and mobility)   Prognosis Good   Assessment Patient evaluated by Occupational Therapy.  Patient admitted with Uncontrolled diabetes mellitus with hyperglycemia (HCC).  The patients occupational profile, medical and therapy history includes a extensive additional review of physical, cognitive, or psychosocial history related to current functional performance.  Comorbidities affecting functional mobility and ADLS include: diabetes.  Prior to admission, patient was independent with functional mobility without assistive device, independent with ADLS, and independent with IADLS.  The evaluation identifies the following performance deficits: weakness, impaired balance, decreased endurance, increased fall risk, new onset of impairment of functional mobility, decreased ADLS, decreased IADLS, pain, decreased activity tolerance, decreased safety awareness, impaired judgement, and decreased strength, that result in activity limitations and/or participation restrictions. This evaluation requires clinical decision making of high complexity, because the patient presents with comorbidites that affect occupational performance and required significant modification of tasks or assistance with consideration of multiple treatment options.  The Barthel Index was used as a functional outcome tool presenting with a score of Barthel Index Score: 60, indicating marked limitations of functional mobility and ADLS.  The patient's raw score on the -PAC Daily Activity Inpatient Short Form is 21. A raw score of greater than or equal to 19 suggests the patient may benefit from discharge to home. Please refer to the recommendation of the Occupational Therapist for safe discharge planning.  Patient will benefit from skilled  Occupational Therapy services to address above deficits and facilitate a safe return to prior level of function.   Goals   Patient Goals to feel better   STG Time Frame   (1-7 days)   Short Term Goal  Goals established to promote Patient Goals: to feel better:  Grooming: supervision standing at sink; Bathing: supervision; Lower Body Dressing: supervision; Toileting: supervision; Patient will increase ambulatory standard toilet transfer to supervision with rolling walker to increase performance and safety with ADLS and functional mobility; Patient will increase standing tolerance to 5 minutes during ADL task to decrease assistance level and decrease fall risk; Patient will increase bed mobility to independent in preparation for ADLS and transfers; Patient will increase functional mobility to and from bathroom with rolling walker with supervision to increase performance with ADLS and to use a toilet;  Patient will improve functional activity tolerance to 10 minutes of sustained functional tasks to increase participation in basic self-care and decrease assistance level;  Patient will increase dynamic standing balance to fair to improve postural stability and decrease fall risk during standing ADLS and transfers.   LTG Time Frame   (8-14 days)   Long Term Goal Grooming: independent standing at sink; Bathing: independent; Lower Body Dressing: independent; Toileting: independent; Patient will increase ambulatory standard toilet transfer to independent with rolling walker to increase performance and safety with ADLS and functional mobility; Patient will increase standing tolerance to 10 minutes during ADL task to decrease assistance level and decrease fall risk;  Patient will increase functional mobility to and from bathroom with rolling walker independently to increase performance with ADLS and to use a toilet;  Patient will improve functional activity tolerance to 20 minutes of sustained functional tasks to increase  participation in basic self-care and decrease assistance level;  Patient will increase dynamic standing balance to fair+ to improve postural stability and decrease fall risk during standing ADLS and transfers.   Pt will score >/= 24/24 on AM-PAC Daily Activity Inpatient scale to promote safe independence with ADLs and functional mobility; Pt will score >/= 90/100 on Barthel Index in order to decrease caregiver assistance needed and increase ability to perform ADLs and functional mobility.   Plan   Treatment Interventions ADL retraining;Functional transfer training;Endurance training;Patient/family training;Equipment evaluation/education;Activityengagement;Compensatory technique education   Goal Expiration Date 11/19/24   OT Frequency 3-5x/wk   Discharge Recommendation   Rehab Resource Intensity Level, OT III (Minimum Resource Intensity)   AM-PAC Daily Activity Inpatient   Lower Body Dressing 3   Bathing 3   Toileting 3   Upper Body Dressing 4   Grooming 4   Eating 4   Daily Activity Raw Score 21   Daily Activity Standardized Score (Calc for Raw Score >=11) 44.27   AM-PAC Applied Cognition Inpatient   Following a Speech/Presentation 4   Understanding Ordinary Conversation 4   Taking Medications 4   Remembering Where Things Are Placed or Put Away 4   Remembering List of 4-5 Errands 4   Taking Care of Complicated Tasks 4   Applied Cognition Raw Score 24   Applied Cognition Standardized Score 62.21   Barthel Index   Feeding 10   Bathing 0   Grooming Score 5   Dressing Score 5   Bladder Score 10   Bowels Score 10   Toilet Use Score 5   Transfers (Bed/Chair) Score 10   Mobility (Level Surface) Score 0   Stairs Score 5   Barthel Index Score 60   Licensure   [place] License Number  Jodi Rochasharon MS OTR/L [id number]

## 2024-11-07 NOTE — ASSESSMENT & PLAN NOTE
UA positive for leukocytes, WBC  Patient asymptomatic  No fever or leukocytosis  Urine culture showed mixed contaminants  Received dose of rocephin, now on augmentin for sinusitis, total 7 days as per ENT

## 2024-11-07 NOTE — PROGRESS NOTES
"Progress Note - Hospitalist   Name: Gabby Olivera 44 y.o. female I MRN: [id number]  Unit/Bed#: 2 [facility] 204 A  Date of Admission: 11/3/2024   Date of Service: 11/7/2024 I Hospital Day: 2    Assessment & Plan  Uncontrolled diabetes mellitus with hyperglycemia (HCC)  Lab Results   Component Value Date    HGBA1C 11.0 (H) 11/03/2024     Recent Labs     11/06/24  1541 11/06/24  2040 11/07/24  0721 11/07/24  1121   POCGLU 260* 178* 187* 200*     Blood Sugar Average: Last 72 hrs:  (P) 200.25  Noted with sugars in 400s-500s on admission, low bicarb, normal AG, mildly elevated beta-hydroxybutyrate  On metformin 500 mg bid, reports she has not been taking it because it \"doesn't help\"  Started on non DKA insulin gtt with improvement  Endocrinology consulted  Drip discontinued and started on Lantus 14 units daily and humalog 5 units tid with SSI; 11/6 increased Lantus to 18 units daily and Humalog 6 units with meals and continue sliding scale on 11/6  Diabetic diet  Nutrition consulted for diabetic diet teaching   Dizziness  Patient presented to ED with dizziness starting day of admission that is worse with head turning and position changes associated with nausea, vomiting, and difficulty ambulating. Patient reports headache as well. She also reports hearing loss and a \"noise\" in her left ear over the past week.  Patient seen in [place] ED 10/27 for viral syndrome and again on 10/31 for decreased hearing and tinnitus in left ear and was discharged home with referral to ENT.  CTA head/neck negative for acute intracranial abnormality, did show sinus disease  Suspect peripheral vertigo given tinnitus, decreased hearing and recent viral illness  Continue with Meclizine 25 mg every 8 hours; patient was able to ambulate better 11/7; able to walk without walker, however gait still very slow, unsteady  Continue augmentin  Zofran prn  Check orthostatic vitals  ENT was consulted, suspect labyrinthitis and sinusitis  PT/OT " consulted, recommending minimal resources at this time  Hypothyroid  Previously on levothyroxine, has not been taking  TSH elevated, T4 within normal limits  Endocrinology following  Restarted on levothyroxine 25 mcg daily  Will need repeat labs in 6-8 weeks  Follow up with PCP/endocrinology outpatient  Abnormal urinalysis  UA positive for leukocytes, WBC  Patient asymptomatic  No fever or leukocytosis  Urine culture showed mixed contaminants  Received dose of rocephin, now on augmentin for sinusitis, total 7 days as per ENT   Hyponatremia  Likely pseudohyponatremia due to hyperglycemia  Improving  Daily bmp  Tinnitus of left ear  As outlined above  ENT saw patient; recommend outpatient follow up for a full audiogram    VTE Pharmacologic Prophylaxis:   Moderate Risk (Score 3-4) - Pharmacological DVT Prophylaxis Ordered: enoxaparin (Lovenox).    Mobility:   Basic Mobility Inpatient Raw Score: 21  JH-HLM Goal: 6: Walk 10 steps or more  JH-HLM Achieved: 8: Walk 250 feet ot more  JH-HLM Goal achieved. Continue to encourage appropriate mobility.    Patient Centered Rounds: I performed bedside rounds with nursing staff today.   Discussions with Specialists or Other Care Team Provider: Multidisciplinary team     Education and Discussions with Family / Patient: Updated  (sister) at bedside.    Current Length of Stay: 2 day(s)  Current Patient Status: Inpatient   Certification Statement: The patient will continue to require additional inpatient hospital stay due to insulin adjustment, PT and OT continue to work with patient  Discharge Plan: Anticipate discharge in 24-48 hrs to home.    Code Status: Level 1 - Full Code    Subjective   Patient seen ambulating in hallway with physical therapy, is able to ambulate without walker today.  Her gait is still very slow and unsteady however improving.  She reports that she feels the dizziness is better with the medication however she still has the ringing in the left ear  and a sense of fullness in that region.  Denies any nausea or vomiting, no fever or chills.    Objective :  Temp:  [97.9 °F (36.6 °C)-98.7 °F (37.1 °C)] 98.7 °F (37.1 °C)  HR:  [84-92] 84  BP: (115-119)/(77) 115/77  Resp:  [17-18] 18  SpO2:  [91 %-96 %] 96 %  O2 Device: None (Room air)    Body mass index is 23.59 kg/m².     Input and Output Summary (last 24 hours):   No intake or output data in the 24 hours ending 11/07/24 1312    Physical Exam  Vitals reviewed.   Constitutional:       Appearance: She is ill-appearing.   HENT:      Head: Normocephalic.      Ears:      Comments: Reports ringing in left ear, sense of fullness.      Nose: Nose normal.      Mouth/Throat:      Mouth: Mucous membranes are moist.   Eyes:      Extraocular Movements: Extraocular movements intact.      Conjunctiva/sclera: Conjunctivae normal.   Cardiovascular:      Rate and Rhythm: Normal rate and regular rhythm.      Pulses: Normal pulses.      Heart sounds: Normal heart sounds.   Pulmonary:      Effort: Pulmonary effort is normal.      Breath sounds: Normal breath sounds.   Abdominal:      General: Bowel sounds are normal. There is no distension.      Palpations: Abdomen is soft.      Tenderness: There is no abdominal tenderness.   Genitourinary:     Comments: Voiding spontaneously   Musculoskeletal:      Cervical back: Normal range of motion.      Comments: Able to ambulate without walker today; dizziness improved. Gait still slow and unsteady   Skin:     General: Skin is warm and dry.      Capillary Refill: Capillary refill takes less than 2 seconds.   Neurological:      General: No focal deficit present.      Mental Status: She is alert and oriented to person, place, and time.   Psychiatric:         Mood and Affect: Mood normal.         Behavior: Behavior normal.         Thought Content: Thought content normal.         Judgment: Judgment normal.           Lines/Drains:              Lab Results: I have reviewed the following results:    Results from last 7 days   Lab Units 11/07/24  0628 11/04/24 0627 11/03/24 1944   WBC Thousand/uL 5.40   < > 10.89*   HEMOGLOBIN g/dL 13.6   < > 14.7   HEMATOCRIT % 39.5   < > 41.8   PLATELETS Thousands/uL 223   < > 232   SEGS PCT %  --   --  84*   LYMPHO PCT %  --   --  6*   MONO PCT %  --   --  9   EOS PCT %  --   --  0    < > = values in this interval not displayed.     Results from last 7 days   Lab Units 11/07/24  0628 11/03/24 2207 11/03/24 1944   SODIUM mmol/L 136   < > 130*   POTASSIUM mmol/L 3.7   < > 3.6   CHLORIDE mmol/L 110*   < > 100   CO2 mmol/L 18*   < > 17*   BUN mg/dL 16   < > 16   CREATININE mg/dL 0.58*   < > 0.77   ANION GAP mmol/L 8   < > 13   CALCIUM mg/dL 9.3   < > 8.8   ALBUMIN g/dL  --   --  4.4   TOTAL BILIRUBIN mg/dL  --   --  0.82   ALK PHOS U/L  --   --  70   ALT U/L  --   --  9   AST U/L  --   --  8*   GLUCOSE RANDOM mg/dL 164*   < > 517*    < > = values in this interval not displayed.         Results from last 7 days   Lab Units 11/07/24  1121 11/07/24  0721 11/06/24  2040 11/06/24  1541 11/06/24  1105 11/06/24  0700 11/06/24  0213 11/05/24  2122 11/05/24  1547 11/05/24  1204 11/05/24  0933 11/05/24  0706   POC GLUCOSE mg/dl 200* 187* 178* 260* 268* 204* 260* 287* 237* 351* 337* 104     Results from last 7 days   Lab Units 11/03/24  1944   HEMOGLOBIN A1C % 11.0*           Recent Cultures (last 7 days):   Results from last 7 days   Lab Units 11/04/24  0354   URINE CULTURE  >100,000 cfu/ml       Imaging Results Review: No pertinent imaging studies reviewed.      Last 24 Hours Medication List:     Current Facility-Administered Medications:     acetaminophen (TYLENOL) tablet 650 mg, Q6H PRN    amoxicillin-clavulanate (AUGMENTIN) 875-125 mg per tablet 1 tablet, Q12H MABEL    enoxaparin (LOVENOX) subcutaneous injection 40 mg, Daily    influenza vaccine (PF) (Fluarix) IM injection 0.5 mL, Prior to discharge    insulin glargine (LANTUS) subcutaneous injection 18 Units 0.18 mL, Daily With  Breakfast    insulin lispro (HumALOG/ADMELOG) 100 units/mL subcutaneous injection 1-5 Units, TID AC **AND** Fingerstick Glucose (POCT), TID AC    insulin lispro (HumALOG/ADMELOG) 100 units/mL subcutaneous injection 1-5 Units, HS    insulin lispro (HumALOG/ADMELOG) 100 units/mL subcutaneous injection 6 Units, TID With Meals    levothyroxine tablet 25 mcg, Early Morning    meclizine (ANTIVERT) tablet 25 mg, Q8H MABEL    ondansetron (ZOFRAN) injection 4 mg, Q6H PRN    pneumococcal 20-selena conj vacc (PREVNAR 20) IM Injection 0.5 mL, Prior to discharge    senna (SENOKOT) tablet 8.6 mg, HS    sodium chloride 0.9 % bolus 1,000 mL, Once    Administrative Statements   Today, Patient Was Seen By: SHARI Antunez  I have spent a total time of greater than 45 minutes in caring for this patient on the day of the visit/encounter including Diagnostic results, Counseling / Coordination of care, Documenting in the medical record, Reviewing / ordering tests, medicine, procedures  , and Communicating with other healthcare professionals .    **Please Note: This note may have been constructed using a voice recognition system.**

## 2024-11-07 NOTE — PLAN OF CARE
Problem: OCCUPATIONAL THERAPY ADULT  Goal: Performs self-care activities at highest level of function for planned discharge setting.  See evaluation for individualized goals.  Description: Treatment Interventions: ADL retraining, Functional transfer training, Endurance training, Patient/family training, Equipment evaluation/education, Activityengagement, Compensatory technique education          See flowsheet documentation for full assessment, interventions and recommendations.   Outcome: Progressing  Note: Limitation: Decreased ADL status, Decreased Safe judgement during ADL, Decreased endurance, Decreased self-care trans, Decreased high-level ADLs (decreased balance and mobility)  Prognosis: Good  Assessment: Patient seen for OT treatment.  Patient is cooperative and pleasant.  Patient is demonstrating progress towards OT goals.  Patient able to ambulate with min assist without assistive device today. Patient will benefit from continued OT services to maximize functional performance with ADLS.     Rehab Resource Intensity Level, OT: III (Minimum Resource Intensity)

## 2024-11-07 NOTE — ASSESSMENT & PLAN NOTE
"Lab Results   Component Value Date    HGBA1C 11.0 (H) 11/03/2024     Recent Labs     11/06/24  1541 11/06/24  2040 11/07/24  0721 11/07/24  1121   POCGLU 260* 178* 187* 200*     Blood Sugar Average: Last 72 hrs:  (P) 200.25  Noted with sugars in 400s-500s on admission, low bicarb, normal AG, mildly elevated beta-hydroxybutyrate  On metformin 500 mg bid, reports she has not been taking it because it \"doesn't help\"  Started on non DKA insulin gtt with improvement  Endocrinology consulted  Drip discontinued and started on Lantus 14 units daily and humalog 5 units tid with SSI; 11/6 increased Lantus to 18 units daily and Humalog 6 units with meals and continue sliding scale on 11/6  Diabetic diet  Nutrition consulted for diabetic diet teaching   "

## 2024-11-07 NOTE — OCCUPATIONAL THERAPY NOTE
OT TREATMENT         11/07/24 1045   OT Last Visit   OT Visit Date 11/07/24   Note Type   Note Type Treatment   Pain Assessment   Pain Assessment Tool 0-10   Pain Score No Pain   Restrictions/Precautions   Other Precautions Fall Risk  (dizziness at times with head turns when walking)   ADL   Grooming Assistance 4  Minimal Assistance   Grooming Deficit Wash/dry hands   Grooming Comments standing at sink   LB Dressing Assistance 7  Independent   LB Dressing Deficit Don/doff R sock;Don/doff L sock   Transfers   Sit to Stand 5  Supervision   Stand to Sit 5  Supervision   Functional Mobility   Functional Mobility 4  Minimal assistance   Additional Comments household distance, cues to look ahead, when pt turns head and looks down dizziness increases   Cognition   Overall Cognitive Status WFL   Arousal/Participation Cooperative   Attention Within functional limits   Orientation Level Oriented X4   Following Commands Follows all commands and directions without difficulty   Assessment   Assessment Patient seen for OT treatment.  Patient is cooperative and pleasant.  Patient is demonstrating progress towards OT goals.  Patient able to ambulate with min assist without assistive device today. Patient will benefit from continued OT services to maximize functional performance with ADLS. The patient's raw score on the AM-PAC Daily Activity Inpatient Short Form is 21. A raw score of greater than or equal to 19 suggests the patient may benefit from discharge to home. Please refer to the recommendation of the Occupational Therapist for safe discharge planning.   Plan   Treatment Interventions ADL retraining;Functional transfer training;Endurance training;Patient/family training;Equipment evaluation/education;Activityengagement;Compensatory technique education   OT Frequency 3-5x/wk   Discharge Recommendation   Rehab Resource Intensity Level, OT III (Minimum Resource Intensity)   -PAC Daily Activity Inpatient   Lower Body Dressing 3    Bathing 3   Toileting 3   Upper Body Dressing 4   Grooming 4   Eating 4   Daily Activity Raw Score 21   Daily Activity Standardized Score (Calc for Raw Score >=11) 44.27   AM-PAC Applied Cognition Inpatient   Following a Speech/Presentation 4   Understanding Ordinary Conversation 4   Taking Medications 4   Remembering Where Things Are Placed or Put Away 4   Remembering List of 4-5 Errands 4   Taking Care of Complicated Tasks 4   Applied Cognition Raw Score 24   Applied Cognition Standardized Score 62.21   Licensure   [place] License Number  Jodi Lee MS OTR/L [id number]

## 2024-11-08 VITALS
RESPIRATION RATE: 16 BRPM | OXYGEN SATURATION: 96 % | DIASTOLIC BLOOD PRESSURE: 65 MMHG | TEMPERATURE: 98.6 F | HEIGHT: 62 IN | SYSTOLIC BLOOD PRESSURE: 110 MMHG | WEIGHT: 129 LBS | HEART RATE: 93 BPM | BODY MASS INDEX: 23.74 KG/M2

## 2024-11-08 PROBLEM — E87.1 HYPONATREMIA: Status: RESOLVED | Noted: 2024-11-03 | Resolved: 2024-11-08

## 2024-11-08 LAB
ANION GAP SERPL CALCULATED.3IONS-SCNC: 8 MMOL/L (ref 4–13)
BUN SERPL-MCNC: 17 MG/DL (ref 5–25)
CALCIUM SERPL-MCNC: 9 MG/DL (ref 8.4–10.2)
CHLORIDE SERPL-SCNC: 109 MMOL/L (ref 96–108)
CO2 SERPL-SCNC: 19 MMOL/L (ref 21–32)
CREAT SERPL-MCNC: 0.62 MG/DL (ref 0.6–1.3)
ERYTHROCYTE [DISTWIDTH] IN BLOOD BY AUTOMATED COUNT: 11.8 % (ref 11.6–15.1)
GAD65 AB SER-ACNC: <5 U/ML (ref 0–5)
GFR SERPL CREATININE-BSD FRML MDRD: 110 ML/MIN/1.73SQ M
GLUCOSE SERPL-MCNC: 187 MG/DL (ref 65–140)
GLUCOSE SERPL-MCNC: 195 MG/DL (ref 65–140)
GLUCOSE SERPL-MCNC: 201 MG/DL (ref 65–140)
GLUCOSE SERPL-MCNC: 211 MG/DL (ref 65–140)
GLUCOSE SERPL-MCNC: 231 MG/DL (ref 65–140)
HCT VFR BLD AUTO: 38.3 % (ref 34.8–46.1)
HGB BLD-MCNC: 13.3 G/DL (ref 11.5–15.4)
MCH RBC QN AUTO: 30.8 PG (ref 26.8–34.3)
MCHC RBC AUTO-ENTMCNC: 34.7 G/DL (ref 31.4–37.4)
MCV RBC AUTO: 89 FL (ref 82–98)
PANC ISLET CELL AB TITR SER: NEGATIVE {TITER}
PLATELET # BLD AUTO: 220 THOUSANDS/UL (ref 149–390)
PMV BLD AUTO: 9.8 FL (ref 8.9–12.7)
POTASSIUM SERPL-SCNC: 3.5 MMOL/L (ref 3.5–5.3)
RBC # BLD AUTO: 4.32 MILLION/UL (ref 3.81–5.12)
SODIUM SERPL-SCNC: 136 MMOL/L (ref 135–147)
WBC # BLD AUTO: 4.91 THOUSAND/UL (ref 4.31–10.16)

## 2024-11-08 PROCEDURE — 85027 COMPLETE CBC AUTOMATED: CPT | Performed by: NURSE PRACTITIONER

## 2024-11-08 PROCEDURE — 82948 REAGENT STRIP/BLOOD GLUCOSE: CPT

## 2024-11-08 PROCEDURE — 80048 BASIC METABOLIC PNL TOTAL CA: CPT | Performed by: NURSE PRACTITIONER

## 2024-11-08 PROCEDURE — 97530 THERAPEUTIC ACTIVITIES: CPT

## 2024-11-08 PROCEDURE — 99239 HOSP IP/OBS DSCHRG MGMT >30: CPT

## 2024-11-08 RX ORDER — GLUCOSAMINE HCL/CHONDROITIN SU 500-400 MG
CAPSULE ORAL
Qty: 100 EACH | Refills: 0 | Status: SHIPPED | OUTPATIENT
Start: 2024-11-08

## 2024-11-08 RX ORDER — PEN NEEDLE, DIABETIC 32GX 5/32"
NEEDLE, DISPOSABLE MISCELLANEOUS
Qty: 100 EACH | Refills: 0 | Status: SHIPPED | OUTPATIENT
Start: 2024-11-08 | End: 2024-11-08

## 2024-11-08 RX ORDER — MECLIZINE HYDROCHLORIDE 25 MG/1
25 TABLET ORAL EVERY 8 HOURS PRN
Start: 2024-11-08

## 2024-11-08 RX ORDER — BLOOD SUGAR DIAGNOSTIC
STRIP MISCELLANEOUS
Qty: 100 EACH | Refills: 0 | Status: SHIPPED | OUTPATIENT
Start: 2024-11-08

## 2024-11-08 RX ORDER — MECLIZINE HYDROCHLORIDE 25 MG/1
25 TABLET ORAL EVERY 8 HOURS SCHEDULED
Qty: 30 TABLET | Refills: 0 | Status: SHIPPED | OUTPATIENT
Start: 2024-11-08 | End: 2024-11-08

## 2024-11-08 RX ORDER — LEVOTHYROXINE SODIUM 25 UG/1
25 TABLET ORAL
Qty: 30 TABLET | Refills: 0 | Status: SHIPPED | OUTPATIENT
Start: 2024-11-09

## 2024-11-08 RX ORDER — INSULIN LISPRO 100 [IU]/ML
6 INJECTION, SOLUTION INTRAVENOUS; SUBCUTANEOUS
Qty: 3 ML | Refills: 0 | Status: SHIPPED | OUTPATIENT
Start: 2024-11-08 | End: 2024-11-08

## 2024-11-08 RX ORDER — INSULIN GLARGINE 100 [IU]/ML
18 INJECTION, SOLUTION SUBCUTANEOUS
Qty: 10 ML | Refills: 0 | Status: SHIPPED | OUTPATIENT
Start: 2024-11-09

## 2024-11-08 RX ORDER — BLOOD-GLUCOSE METER
KIT MISCELLANEOUS
Qty: 1 KIT | Refills: 0 | Status: SHIPPED | OUTPATIENT
Start: 2024-11-08

## 2024-11-08 RX ORDER — INSULIN LISPRO 100 [IU]/ML
6 INJECTION, SOLUTION INTRAVENOUS; SUBCUTANEOUS
Qty: 3 ML | Refills: 0 | Status: SHIPPED | OUTPATIENT
Start: 2024-11-08

## 2024-11-08 RX ORDER — LANCETS 33 GAUGE
EACH MISCELLANEOUS
Qty: 100 EACH | Refills: 0 | Status: SHIPPED | OUTPATIENT
Start: 2024-11-08

## 2024-11-08 RX ADMIN — LEVOTHYROXINE SODIUM 25 MCG: 25 TABLET ORAL at 05:56

## 2024-11-08 RX ADMIN — INSULIN LISPRO 2 UNITS: 100 INJECTION, SOLUTION INTRAVENOUS; SUBCUTANEOUS at 08:40

## 2024-11-08 RX ADMIN — INSULIN LISPRO 6 UNITS: 100 INJECTION, SOLUTION INTRAVENOUS; SUBCUTANEOUS at 11:35

## 2024-11-08 RX ADMIN — INSULIN LISPRO 6 UNITS: 100 INJECTION, SOLUTION INTRAVENOUS; SUBCUTANEOUS at 08:42

## 2024-11-08 RX ADMIN — INSULIN LISPRO 1 UNITS: 100 INJECTION, SOLUTION INTRAVENOUS; SUBCUTANEOUS at 11:35

## 2024-11-08 RX ADMIN — INSULIN LISPRO 6 UNITS: 100 INJECTION, SOLUTION INTRAVENOUS; SUBCUTANEOUS at 16:29

## 2024-11-08 RX ADMIN — MECLIZINE HYDROCHLORIDE 25 MG: 25 TABLET ORAL at 05:56

## 2024-11-08 RX ADMIN — INSULIN GLARGINE 18 UNITS: 100 INJECTION, SOLUTION SUBCUTANEOUS at 08:52

## 2024-11-08 RX ADMIN — INSULIN LISPRO 1 UNITS: 100 INJECTION, SOLUTION INTRAVENOUS; SUBCUTANEOUS at 16:28

## 2024-11-08 RX ADMIN — MECLIZINE HYDROCHLORIDE 25 MG: 25 TABLET ORAL at 14:12

## 2024-11-08 RX ADMIN — ENOXAPARIN SODIUM 40 MG: 40 INJECTION SUBCUTANEOUS at 08:52

## 2024-11-08 RX ADMIN — AMOXICILLIN AND CLAVULANATE POTASSIUM 1 TABLET: 875; 125 TABLET, COATED ORAL at 08:52

## 2024-11-08 NOTE — CASE MANAGEMENT
Case Management Discharge Planning Note    Patient name Gabby Olivera  Location 2 [facility] 204[address] A MRN [id number]  : 1980 Date 2024       Current Admission Date: 11/3/2024  Current Admission Diagnosis:Uncontrolled diabetes mellitus with hyperglycemia (HCC)   Patient Active Problem List    Diagnosis Date Noted Date Diagnosed    Abnormal urinalysis 2024     Uncontrolled diabetes mellitus with hyperglycemia (HCC) 2024     Acidosis 2024     Tinnitus of left ear 2024     Dizziness 2024     Non compliance w medication regimen 2024     Hypothyroid 2024     Vaginal bleeding 2024     Uterine fibroid 2024     Encounter for counseling regarding contraception 2023       LOS (days): 3  Geometric Mean LOS (GMLOS) (days): 3  Days to GMLOS:-0.1     OBJECTIVE:  Risk of Unplanned Readmission Score: 14.51     Current admission status: Inpatient   Preferred Pharmacy:   [facility]/pharmacy #[id number] - [place] - [address]  [address] [address]  Phone: [phone] Fax: [phone]    [facility] [place] [address]  [address] [place] [address]  Phone: [phone] Fax: [phone]    Primary Care Provider: No primary care provider on file.    Primary Insurance: HEALTHCARE ASSISTANCE PENDING  Secondary Insurance:     DISCHARGE DETAILS:    Discharge planning discussed with:: Patient  Freedom of Choice: Yes  Comments - Freedom of Choice: SW following to assist with DCP.  Discharge is planned for today.  SW met with pt along with PA and RN to review plans.  Pt's plan is go home to her sister's home.  Pt shared that she talked with her sister about staying with family for a little while.  SW also gave pt information on [facility] if additional assistance is needed for shelter.  Care team talked with pt about importance of following up with [facility]  for medical care and assistance in applying for Medicaid and other support programs she may be eligible for. Pt verbalized understanding.  Pt's medications including insulin, glucometer and supplies, antibiotic, Meclazine and Levothryoxine was ordered through [facility] Indigent Medication [facility].  Pt aware that medications will be delivered to [facility] for her to take home and again follow up at [facility] for continued medication management was reinforced.  Pt has SW card for future reference if needed.  No other discharge needs expressed at this time by pt.  Pt's family will be in later this evening to transport her home.    Other Referral/Resources/Interventions Provided:  Financial Resources Provided: Indigent Medication, Medicaid  Interventions: PCP  Referral Comments: [facility] appointment on 11/20/24 at 2:20 pm.  Pt has [employer] application (in [place]) to follow up with  at appointment. Pt aware that she can also go to Medicaid office in [place] or go online to start application.    Treatment Team Recommendation: Home  Discharge Destination Plan:: Home  Transport at Discharge : Family

## 2024-11-08 NOTE — ASSESSMENT & PLAN NOTE
In setting of labyrinthitis as above  ENT consulted; continue Augmentin x 7 days total and follow up outpatient

## 2024-11-08 NOTE — DISCHARGE SUMMARY
"Discharge Summary - Hospitalist   Name: Gabby Olivera 44 y.o. female I MRN: [id number]  Unit/Bed#: 2 [place] [address] A I Date of Admission: 11/3/2024   Date of Service: 11/8/2024 I Hospital Day: 3     Assessment & Plan  Uncontrolled diabetes mellitus with hyperglycemia (HCC)  Lab Results   Component Value Date    HGBA1C 11.0 (H) 11/03/2024     Recent Labs     11/07/24 2050 11/08/24  0716 11/08/24  0838 11/08/24  1125   POCGLU 181* 187* 231* 211*     Blood Sugar Average: Last 72 hrs:  (P) 212.6  Noted with sugars in 400s-500s on admission, low bicarb, normal AG, mildly elevated beta-hydroxybutyrate  On metformin 500 mg bid, reports she has not been taking it because it \"doesn't help\"  Initially started on non DKA insulin gtt with improvement  Endocrinology consulted  Continue Lantus 18 units daily and Humalog 6 units TID with meals on discharge  Medications and diabetic supplies given to patient prior to discharge  Continue follow up with endocrinology and PCP  Has follow up with PCP on November 20  Dizziness  Patient presented to ED with dizziness starting day of admission that is worse with head turning and position changes associated with nausea, vomiting, and difficulty ambulating. Patient reports headache as well. She also reports hearing loss and a \"noise\" in her left ear over the past week.  Patient seen in [place] ED 10/27 for viral syndrome and again on 10/31 for decreased hearing and tinnitus in left ear and was discharged home with referral to ENT.  CTA head/neck negative for acute intracranial abnormality, did show sinus disease  Suspect peripheral vertigo given tinnitus, decreased hearing and recent viral illness  ENT was consulted, suspect labyrinthitis  Continue Augmentin x 7 days total  Recently started on prednisone and completed 4 days, however, will hold off further steroids given uncontrolled hyperglycemia prompting admission  PT/OT - patient given cane prior to discharge  Ambulation has " significantly improved since admission, able to walk without assistance of PT and showered independently this morning  Continue meclizine as needed  Follow up with PCP and ENT outpatient  Hypothyroid  Previously on levothyroxine, has not been taking  TSH elevated, T4 within normal limits  Endocrinology following  Restarted on levothyroxine 25 mcg daily  Will need repeat labs in 6-8 weeks  Follow up with PCP/endocrinology outpatient  Abnormal urinalysis  UA positive for leukocytes, WBC  Patient asymptomatic  No fever or leukocytosis  Urine culture negative  Tinnitus of left ear  In setting of labyrinthitis as above  ENT consulted; continue Augmentin x 7 days total and follow up outpatient  Hyponatremia (Resolved: 11/8/2024)  pseudohyponatremia due to hyperglycemia  Resolved, WNL     Medical Problems       Resolved Problems  Date Reviewed: 11/8/2024            Resolved    Hyponatremia 11/8/2024     Resolved by  Florence Bourne PA-C        Discharging Physician / Practitioner: Florence Bourne PA-C  PCP: No primary care provider on file.  Admission Date:   Admission Orders (From admission, onward)       Ordered        11/05/24 1406  INPATIENT ADMISSION  Once            11/03/24 2154  Place in Observation  Once                          Discharge Date: 11/08/24    Consultations During Hospital Stay:  Endocrinology  ENT    Procedures Performed:   none    Significant Findings / Test Results:   CTA head/neck: 1. No acute intracranial or angiographic abnormality 2. Sinus disease     Incidental Findings:   none    Test Results Pending at Discharge (will require follow up):   none     Outpatient Tests Requested:  none    Complications:  none    Reason for Admission: dizziness, hyperglycemia    Hospital Course:   Gabby Olivera is a 44 y.o. female patient who originally presented to the hospital on 11/3/2024 due to dizziness, feeling off balance, nausea, vomiting, and left ear ringing/hearing loss.  Patient had been seen in  the ED a few days prior for upper respiratory symptoms and was discharged home.  She was again seen in the ED a few days later for left ear ringing/hearing loss and dizziness and sent home and was told to follow-up with ENT outpatient.  Patient presented to the ED this admission for ongoing dizziness at home making it difficult to ambulate. In ED, patient was noted to be hyperglycemic with sugars in 400-500s. She was started on insulin gtt. CTA head/neck negative for acute intracranial abnormality, did show sinus disease. ENT consulted, suspect symptoms due to labyrinthitis given dizziness, feeling off balance, left hearing loss/ear ringing in setting of recent upper respiratory illness. Patient had been taking prednisone a few days prior to admission but now held due to uncontrolled hyperglycemia. Recommended to continue augmentin x 7 days and follow up with ENT outpatient. She can continue meclizine prn on discharge. Patient seen by PT/OT and has significantly improved. She is now able to ambulate independently and was given a cane for assistance prior to discharge. Endocrinology was consulted and patient was switched to basal bolus regimen which will be continued on discharge. Patient's medications delivered to her prior to discharge. She will need follow up with PCP, endocrinology, and ENT outpatient. She has PCP appointment scheduled on 11/20/24.    Please see above list of diagnoses and related plan for additional information.     Condition at Discharge: stable    Discharge Day Visit / Exam:   Subjective:  Patient seen and examined at bedside this morning with RN who is able to translate. Patient reports dizziness continues to improve. She was seen ambulating with PT without assistance and was able to shower independently this morning. Reports ongoing, intermittent ringing in her left ear with decreased hearing. Denies nausea, vomiting, or abdominal pain. Good appetite. Discussed new medications on discharge  "and importance of follow up with PCP, endocrinology, and ENT. Patient feels she is ready to be discharged today.  Vitals: Blood Pressure: 114/75 (11/07/24 2248)  Pulse: 78 (11/07/24 2248)  Temperature: 98.4 °F (36.9 °C) (11/07/24 2248)  Temp Source: Oral (11/06/24 2235)  Respirations: 18 (11/07/24 1934)  Height: 5' 2\" (157.5 cm) (11/03/24 2300)  Weight - Scale: 58.5 kg (129 lb) (11/03/24 2300)  SpO2: 98 % (11/07/24 2248)  Physical Exam  Vitals and nursing note reviewed.   Constitutional:       General: She is not in acute distress.     Appearance: She is well-developed.   Cardiovascular:      Rate and Rhythm: Normal rate and regular rhythm.   Pulmonary:      Effort: Pulmonary effort is normal. No respiratory distress.      Breath sounds: Normal breath sounds.   Abdominal:      Tenderness: There is no abdominal tenderness.   Musculoskeletal:         General: No swelling.   Skin:     General: Skin is warm and dry.      Capillary Refill: Capillary refill takes less than 2 seconds.   Neurological:      General: No focal deficit present.      Mental Status: She is alert and oriented to person, place, and time.   Psychiatric:         Mood and Affect: Mood normal.          Discussion with Family: Patient declined call to .     Discharge instructions/Information to patient and family:   See after visit summary for information provided to patient and family.      Provisions for Follow-Up Care:  See after visit summary for information related to follow-up care and any pertinent home health orders.      Mobility at time of Discharge:   Basic Mobility Inpatient Raw Score: 22  JH-HLM Goal: 7: Walk 25 feet or more  JH-HLM Achieved: 7: Walk 25 feet or more  HLM Goal achieved. Continue to encourage appropriate mobility.     Disposition:   Home    Planned Readmission: none    Discharge Medications:  See after visit summary for reconciled discharge medications provided to patient and/or family.      Administrative " Statements   Discharge Statement:  I have spent a total time of 65 minutes in caring for this patient on the day of the visit/encounter. >30 minutes of time was spent on: Diagnostic results, Risks and benefits of tx options, Instructions for management, Patient and family education, Importance of tx compliance, Risk factor reductions, Counseling / Coordination of care, Documenting in the medical record, Reviewing / ordering tests, medicine, procedures  , and Communicating with other healthcare professionals .    **Please Note: This note may have been constructed using a voice recognition system**

## 2024-11-08 NOTE — ASSESSMENT & PLAN NOTE
UA positive for leukocytes, WBC  Patient asymptomatic  No fever or leukocytosis  Urine culture negative

## 2024-11-08 NOTE — ASSESSMENT & PLAN NOTE
"Patient presented to ED with dizziness starting day of admission that is worse with head turning and position changes associated with nausea, vomiting, and difficulty ambulating. Patient reports headache as well. She also reports hearing loss and a \"noise\" in her left ear over the past week.  Patient seen in [place] ED 10/27 for viral syndrome and again on 10/31 for decreased hearing and tinnitus in left ear and was discharged home with referral to ENT.  CTA head/neck negative for acute intracranial abnormality, did show sinus disease  Suspect peripheral vertigo given tinnitus, decreased hearing and recent viral illness  ENT was consulted, suspect labyrinthitis  Continue Augmentin x 7 days total  Recently started on prednisone and completed 4 days, however, will hold off further steroids given uncontrolled hyperglycemia prompting admission  PT/OT - patient given cane prior to discharge  Ambulation has significantly improved since admission, able to walk without assistance of PT and showered independently this morning  Continue meclizine as needed  Follow up with PCP and ENT outpatient  "

## 2024-11-08 NOTE — PLAN OF CARE
Problem: GASTROINTESTINAL - ADULT  Goal: Minimal or absence of nausea and/or vomiting  Description: INTERVENTIONS:  - Administer IV fluids if ordered to ensure adequate hydration  - Maintain NPO status until nausea and vomiting are resolved  - Nasogastric tube if ordered  - Administer ordered antiemetic medications as needed  - Provide nonpharmacologic comfort measures as appropriate  - Advance diet as tolerated, if ordered  - Consider nutrition services referral to assist patient with adequate nutrition and appropriate food choices  Outcome: Progressing  Goal: Maintains or returns to baseline bowel function  Description: INTERVENTIONS:  - Assess bowel function  - Encourage oral fluids to ensure adequate hydration  - Administer IV fluids if ordered to ensure adequate hydration  - Administer ordered medications as needed  - Encourage mobilization and activity  - Consider nutritional services referral to assist patient with adequate nutrition and appropriate food choices  Outcome: Progressing  Goal: Maintains adequate nutritional intake  Description: INTERVENTIONS:  - Monitor percentage of each meal consumed  - Identify factors contributing to decreased intake, treat as appropriate  - Assist with meals as needed  - Monitor I&O, weight, and lab values if indicated  - Obtain nutrition services referral as needed  Outcome: Progressing  Goal: Oral mucous membranes remain intact  Description: INTERVENTIONS  - Assess oral mucosa and hygiene practices  - Implement preventative oral hygiene regimen  - Implement oral medicated treatments as ordered  - Initiate Nutrition services referral as needed  Outcome: Progressing     Problem: METABOLIC, FLUID AND ELECTROLYTES - ADULT  Goal: Electrolytes maintained within normal limits  Description: INTERVENTIONS:  - Monitor labs and assess patient for signs and symptoms of electrolyte imbalances  - Administer electrolyte replacement as ordered  - Monitor response to electrolyte  replacements, including repeat lab results as appropriate  - Instruct patient on fluid and nutrition as appropriate  Outcome: Progressing  Goal: Fluid balance maintained  Description: INTERVENTIONS:  - Monitor labs   - Monitor I/O and WT  - Instruct patient on fluid and nutrition as appropriate  - Assess for signs & symptoms of volume excess or deficit  Outcome: Progressing  Goal: Glucose maintained within target range  Description: INTERVENTIONS:  - Monitor Blood Glucose as ordered  - Assess for signs and symptoms of hyperglycemia and hypoglycemia  - Administer ordered medications to maintain glucose within target range  - Assess nutritional intake and initiate nutrition service referral as needed  Outcome: Progressing     Problem: HEMATOLOGIC - ADULT  Goal: Maintains hematologic stability  Description: INTERVENTIONS  - Assess for signs and symptoms of bleeding or hemorrhage  - Monitor labs  - Administer supportive blood products/factors as ordered and appropriate  Outcome: Progressing     Problem: MUSCULOSKELETAL - ADULT  Goal: Maintain or return mobility to safest level of function  Description: INTERVENTIONS:  - Assess patient's ability to carry out ADLs; assess patient's baseline for ADL function and identify physical deficits which impact ability to perform ADLs (bathing, care of mouth/teeth, toileting, grooming, dressing, etc.)  - Assess/evaluate cause of self-care deficits   - Assess range of motion  - Assess patient's mobility  - Assess patient's need for assistive devices and provide as appropriate  - Encourage maximum independence but intervene and supervise when necessary  - Involve family in performance of ADLs  - Assess for home care needs following discharge   - Consider OT consult to assist with ADL evaluation and planning for discharge  - Provide patient education as appropriate  Outcome: Progressing     Problem: Nutrition/Hydration-ADULT  Goal: Nutrient/Hydration intake appropriate for improving,  restoring or maintaining nutritional needs  Description: Monitor and assess patient's nutrition/hydration status for malnutrition. Collaborate with interdisciplinary team and initiate plan and interventions as ordered.  Monitor patient's weight and dietary intake as ordered or per policy. Utilize nutrition screening tool and intervene as necessary. Determine patient's food preferences and provide high-protein, high-caloric foods as appropriate.     INTERVENTIONS:  - Monitor oral intake, urinary output, labs, and treatment plans  - Assess nutrition and hydration status and recommend course of action  - Evaluate amount of meals eaten  - Assist patient with eating if necessary   - Allow adequate time for meals  - Recommend/ encourage appropriate diets, oral nutritional supplements, and vitamin/mineral supplements  - Order, calculate, and assess calorie counts as needed  - Recommend, monitor, and adjust tube feedings and TPN/PPN based on assessed needs  - Assess need for intravenous fluids  - Provide specific nutrition/hydration education as appropriate  - Include patient/family/caregiver in decisions related to nutrition  Outcome: Progressing

## 2024-11-08 NOTE — PHYSICAL THERAPY NOTE
"   PT TREATMENT     11/08/24 1001   PT Last Visit   PT Visit Date 11/08/24   Note Type   Note Type Treatment   Restrictions/Precautions   Other Precautions Pain;Fall Risk   General   Chart Reviewed Yes   Cognition   Overall Cognitive Status WFL   Subjective   Subjective \"I'm doing better\" RN MARY translated.   Bed Mobility   Supine to Sit 7  Independent   Sit to Supine 7  Independent   Transfers   Sit to Stand 7  Independent   Stand to Sit 7  Independent   Stand pivot 7  Independent   Ambulation/Elevation   Gait pattern   (guarded, short step length)   Gait Assistance 5  Supervision   Assistive Device   (with and without cane)   Distance 200 feet each   Stair Management Assistance 5  Supervision   Stair Management Technique Two rails   Number of Stairs 4   Balance   Static Sitting Good   Static Standing Fair   Ambulatory Fair   Activity Tolerance   Activity Tolerance Patient tolerated treatment well;Patient limited by fatigue   Assessment   Prognosis Good   Problem List Impaired balance;Decreased mobility   Assessment Pt demonstrates daily improvements in function s/p hospitalization for labyrinthitis.  Pt is able to walk safely with and with out a cane and on the steps.  Pt issued a cane to use PRN.  Recommend pt ambulate often and follow up with OP PT at the balance center.    The patient's -[facility] Basic Mobility Inpatient Short Form Raw Score is 22.. A Raw score of greater than 16 suggests the patient may benefit from discharge to home. Please also refer to the recommendation of the Physical Therapist for safe discharge planning.         Plan   Treatment/Interventions Elevations;Therapeutic exercise;Gait training;Equipment eval/education;Patient/family training   Progress Progressing toward goals   PT Frequency 4-6x/wk   Discharge Recommendation   Rehab Resource Intensity Level, PT III (Minimum Resource Intensity)   AM-PAC Basic Mobility Inpatient   Turning in Flat Bed Without Bedrails 4   Lying on Back to Sitting " on Edge of Flat Bed Without Bedrails 4   Moving Bed to Chair 4   Standing Up From Chair Using Arms 4   Walk in Room 3   Climb 3-5 Stairs With Railing 3   Basic Mobility Inpatient Raw Score 22   Basic Mobility Standardized Score 47.4   [facility] Highest Level Of Mobility   -[facility] Goal 7: Walk 25 feet or more   -HLM Achieved 8: Walk 250 feet ot more   Licensure   [place] License Number  Kacy Lobo PT [id number]

## 2024-11-08 NOTE — ASSESSMENT & PLAN NOTE
"Lab Results   Component Value Date    HGBA1C 11.0 (H) 11/03/2024     Recent Labs     11/07/24 2050 11/08/24  0716 11/08/24  0838 11/08/24  1125   POCGLU 181* 187* 231* 211*     Blood Sugar Average: Last 72 hrs:  (P) 212.6  Noted with sugars in 400s-500s on admission, low bicarb, normal AG, mildly elevated beta-hydroxybutyrate  On metformin 500 mg bid, reports she has not been taking it because it \"doesn't help\"  Initially started on non DKA insulin gtt with improvement  Endocrinology consulted  Continue Lantus 18 units daily and Humalog 6 units TID with meals on discharge  Medications and diabetic supplies given to patient prior to discharge  Continue follow up with endocrinology and PCP  Has follow up with PCP on November 20 "

## 2024-11-20 ENCOUNTER — TELEPHONE (OUTPATIENT)
Dept: FAMILY MEDICINE CLINIC | Facility: CLINIC | Age: 44
End: 2024-11-20

## 2025-01-05 ENCOUNTER — HOSPITAL ENCOUNTER (EMERGENCY)
Facility: HOSPITAL | Age: 45
Discharge: HOME | End: 2025-01-05
Attending: EMERGENCY MEDICINE | Admitting: EMERGENCY MEDICINE

## 2025-01-05 VITALS
RESPIRATION RATE: 18 BRPM | HEART RATE: 94 BPM | TEMPERATURE: 97.8 F | SYSTOLIC BLOOD PRESSURE: 126 MMHG | OXYGEN SATURATION: 100 % | DIASTOLIC BLOOD PRESSURE: 75 MMHG

## 2025-01-05 DIAGNOSIS — N39.0 UTI (URINARY TRACT INFECTION): Primary | ICD-10-CM

## 2025-01-05 DIAGNOSIS — R42 DIZZINESS: ICD-10-CM

## 2025-01-05 DIAGNOSIS — E11.65 UNCONTROLLED DIABETES MELLITUS WITH HYPERGLYCEMIA (HCC): ICD-10-CM

## 2025-01-05 LAB
BACTERIA UR QL AUTO: ABNORMAL /HPF
BILIRUB UR QL STRIP: NEGATIVE
CLARITY UR: ABNORMAL
COLOR UR: ABNORMAL
EXT PREGNANCY TEST URINE: NEGATIVE
EXT. CONTROL: NORMAL
GLUCOSE UR STRIP-MCNC: ABNORMAL MG/DL
HGB UR QL STRIP.AUTO: ABNORMAL
KETONES UR STRIP-MCNC: NEGATIVE MG/DL
LEUKOCYTE ESTERASE UR QL STRIP: ABNORMAL
NITRITE UR QL STRIP: NEGATIVE
NON-SQ EPI CELLS URNS QL MICRO: ABNORMAL /HPF
PH UR STRIP.AUTO: 7 [PH]
PROT UR STRIP-MCNC: NEGATIVE MG/DL
RBC #/AREA URNS AUTO: ABNORMAL /HPF
SP GR UR STRIP.AUTO: 1.01 (ref 1–1.03)
UROBILINOGEN UR QL STRIP.AUTO: 0.2 E.U./DL
WBC #/AREA URNS AUTO: ABNORMAL /HPF

## 2025-01-05 PROCEDURE — 99283 EMERGENCY DEPT VISIT LOW MDM: CPT

## 2025-01-05 PROCEDURE — 81003 URINALYSIS AUTO W/O SCOPE: CPT | Performed by: EMERGENCY MEDICINE

## 2025-01-05 PROCEDURE — 81025 URINE PREGNANCY TEST: CPT | Performed by: EMERGENCY MEDICINE

## 2025-01-05 PROCEDURE — 81001 URINALYSIS AUTO W/SCOPE: CPT | Performed by: EMERGENCY MEDICINE

## 2025-01-05 PROCEDURE — 99284 EMERGENCY DEPT VISIT MOD MDM: CPT | Performed by: EMERGENCY MEDICINE

## 2025-01-05 RX ORDER — PHENAZOPYRIDINE HYDROCHLORIDE 200 MG/1
200 TABLET, FILM COATED ORAL 3 TIMES DAILY
Qty: 6 TABLET | Refills: 0 | Status: SHIPPED | OUTPATIENT
Start: 2025-01-05 | End: 2025-01-05

## 2025-01-05 RX ORDER — PHENAZOPYRIDINE HYDROCHLORIDE 100 MG/1
100 TABLET, FILM COATED ORAL ONCE
Status: DISCONTINUED | OUTPATIENT
Start: 2025-01-05 | End: 2025-01-05 | Stop reason: HOSPADM

## 2025-01-05 RX ORDER — MECLIZINE HYDROCHLORIDE 25 MG/1
25 TABLET ORAL EVERY 8 HOURS PRN
Qty: 30 TABLET | Refills: 0 | Status: SHIPPED | OUTPATIENT
Start: 2025-01-05

## 2025-01-05 RX ADMIN — CEPHALEXIN 500 MG: 250 CAPSULE ORAL at 14:54

## 2025-01-05 NOTE — ED PROVIDER NOTES
Time reflects when diagnosis was documented in both MDM as applicable and the Disposition within this note       Time User Action Codes Description Comment    1/5/2025  2:41 PM Anjelica Carcamo Add [N39.0] UTI (urinary tract infection)     1/5/2025  2:48 PM Anjelica Carcamo Add [R42] Dizziness     1/5/2025  2:54 PM Anjelica Carcamo Add [E11.65] Uncontrolled diabetes mellitus with hyperglycemia (HCC)           ED Disposition       ED Disposition   Discharge    Condition   Stable    Date/Time   Sun Jan 5, 2025  2:41 PM    Comment   Gabby Champagne Jarod discharge to home/self care.                   Assessment & Plan       Medical Decision Making  Differential diagnosis includes but is not limited to UTI, pregnancy, pyelonephritis,    Problems Addressed:  Uncontrolled diabetes mellitus with hyperglycemia (HCC): chronic illness or injury  UTI (urinary tract infection): acute illness or injury    Amount and/or Complexity of Data Reviewed  External Data Reviewed: labs and notes.     Details: Previous ED visits  Labs: ordered. Decision-making details documented in ED Course.    Risk  OTC drugs.  Prescription drug management.  Risk Details: Patient requesting refill of her metformin and stated she recently moved and she cannot get a prescription I asked her about taking insulin which she has taken in the past she states she does not want to take that anymore.  She also asked for a refill of her Antivert.  Patient started on antibiotics and will follow-up outpatient.             Medications   cephalexin (KEFLEX) capsule 500 mg (500 mg Oral Given 1/5/25 1454)       ED Risk Strat Scores                                              History of Present Illness       Chief Complaint   Patient presents with    Possible UTI     Reports cloudy urine and burning x 2 days       Past Medical History:   Diagnosis Date    Diabetes mellitus (HCC)     Disease of thyroid gland       Past Surgical History:   Procedure Laterality Date      SECTION        Family History   Problem Relation Age of Onset    Diabetes Mother     No Known Problems Father     Diabetes Sister     No Known Problems Brother     No Known Problems Brother     No Known Problems Son     No Known Problems Son     Diabetes Maternal Grandmother     Breast cancer Neg Hx     Colon cancer Neg Hx     Ovarian cancer Neg Hx       Social History     Tobacco Use    Smoking status: Never    Smokeless tobacco: Never   Vaping Use    Vaping status: Never Used   Substance Use Topics    Alcohol use: Not Currently     Comment: socially    Drug use: Never      E-Cigarette/Vaping    E-Cigarette Use Never User       E-Cigarette/Vaping Substances    Nicotine No     THC No     CBD No     Flavoring No     Other No     Unknown No       I have reviewed and agree with the history as documented.     44-year-old female comes in for UTI symptoms started 2 days ago.  History of diabetes often gets urinary tract infections.  No vaginal discharge no fever no flank pain does not think that she is pregnant      History provided by:  Patient   used: Yes    Urinary Frequency  Location:  Suprapubic tenderness and increased urination  Quality:  Burning  Severity:  Moderate  Onset quality:  Sudden  Duration:  2 days  Timing:  Constant  Progression:  Worsening  Chronicity:  New  Associated symptoms: abdominal pain    Associated symptoms: no chest pain, no congestion, no cough, no diarrhea, no ear pain, no fatigue, no fever, no headaches, no nausea, no rash, no shortness of breath, no vomiting and no wheezing        Review of Systems   Constitutional:  Negative for fatigue and fever.   HENT:  Negative for congestion and ear pain.    Eyes:  Negative for discharge and redness.   Respiratory:  Negative for apnea, cough, shortness of breath and wheezing.    Cardiovascular:  Negative for chest pain.   Gastrointestinal:  Positive for abdominal pain. Negative for diarrhea, nausea and vomiting.    Endocrine: Negative for cold intolerance and polydipsia.   Genitourinary:  Positive for frequency. Negative for difficulty urinating and hematuria.   Musculoskeletal:  Negative for arthralgias and back pain.   Skin:  Negative for color change and rash.   Allergic/Immunologic: Negative for environmental allergies and immunocompromised state.   Neurological:  Negative for numbness and headaches.   Hematological:  Negative for adenopathy. Does not bruise/bleed easily.   Psychiatric/Behavioral:  Negative for agitation and behavioral problems.            Objective       ED Triage Vitals [01/05/25 1325]   Temperature Pulse Blood Pressure Respirations SpO2 Patient Position - Orthostatic VS   97.8 °F (36.6 °C) 93 121/56 18 100 % Sitting      Temp Source Heart Rate Source BP Location FiO2 (%) Pain Score    Oral Monitor Left arm -- --      Vitals      Date and Time Temp Pulse SpO2 Resp BP Pain Score FACES Pain Rating User   01/05/25 1456 -- 94 100 % 18 126/75 -- -- FN   01/05/25 1325 97.8 °F (36.6 °C) 93 100 % 18 121/56 -- -- GP            Physical Exam  Vitals and nursing note reviewed.   Constitutional:       General: She is not in acute distress.     Appearance: She is well-developed.   HENT:      Head: Normocephalic and atraumatic.   Eyes:      Conjunctiva/sclera: Conjunctivae normal.   Cardiovascular:      Rate and Rhythm: Normal rate and regular rhythm.      Heart sounds: No murmur heard.  Pulmonary:      Effort: Pulmonary effort is normal. No respiratory distress.      Breath sounds: Normal breath sounds.   Abdominal:      Palpations: Abdomen is soft.      Tenderness: There is no abdominal tenderness.   Musculoskeletal:         General: No swelling.      Cervical back: Neck supple.   Skin:     General: Skin is warm and dry.      Capillary Refill: Capillary refill takes less than 2 seconds.   Neurological:      Mental Status: She is alert.   Psychiatric:         Mood and Affect: Mood normal.         Results Reviewed   "     Procedure Component Value Units Date/Time    Urine Microscopic [[id number]]  (Abnormal) Collected: 01/05/25 1336    Lab Status: Final result Specimen: Urine, Clean Catch Updated: 01/05/25 1425     RBC, UA 4-10 /hpf      WBC, UA 30-50 /hpf      Epithelial Cells Moderate /hpf      Bacteria, UA Innumerable /hpf     UA (URINE) with reflex to Scope [[id number]]  (Abnormal) Collected: 01/05/25 1336    Lab Status: Final result Specimen: Urine, Clean Catch Updated: 01/05/25 1345     Color, UA Straw     Clarity, UA Cloudy     Specific Gravity, UA 1.015     pH, UA 7.0     Leukocytes, UA 1+     Nitrite, UA Negative     Protein, UA Negative mg/dl      Glucose, UA 3+ mg/dl      Ketones, UA Negative mg/dl      Urobilinogen, UA 0.2 E.U./dl      Bilirubin, UA Negative     Occult Blood, UA 1+    POCT pregnancy, urine [[id number]]  (Normal) Collected: 01/05/25 1341    Lab Status: Final result Updated: 01/05/25 1341     EXT Preg Test, Ur Negative     Control Valid            No orders to display       Procedures    ED Medication and Procedure Management   Prior to Admission Medications   Prescriptions Last Dose Informant Patient Reported? Taking?   Alcohol Swabs 70 % PADS   No No   Sig: May substitute brand based on insurance coverage. Check glucose TID.   Blood Glucose Monitoring Suppl (OneTouch Verio Reflect) w/Device KIT   No No   Sig: May substitute brand based on insurance coverage. Check glucose TID.   Insulin Syringe-Needle U-100 (BD Insulin Syringe U/F) 31G X 5/16\" 0.3 ML MISC   No No   Sig: For use with insulin. Pharmacy may dispense brand covered by insurance.   Insulin Syringe-Needle U-100 (BD Insulin Syringe U/F) 31G X 5/16\" 0.3 ML MISC   No No   Sig: For use with insulin. Pharmacy may dispense brand covered by insurance.   OneTouch Delica Lancets 33G MISC   No No   Sig: May substitute brand based on insurance coverage. Check glucose TID.   glucose blood (OneTouch Verio) test strip   No No   Sig: May substitute brand " based on insurance coverage. Check glucose TID.   insulin glargine (LANTUS) 100 units/mL subcutaneous injection   No No   Sig: Inject 18 Units under the skin daily with breakfast   insulin lispro (HumALOG/ADMELOG) 100 units/mL injection   No No   Sig: Inject 6 Units under the skin 3 (three) times a day with meals   levothyroxine 25 mcg tablet   No No   Sig: Take 1 tablet (25 mcg total) by mouth daily in the early morning   meclizine (ANTIVERT) 25 mg tablet   No No   Sig: Take 1 tablet (25 mg total) by mouth every 8 (eight) hours as needed for dizziness   meclizine (ANTIVERT) 25 mg tablet   No Yes   Sig: Take 1 tablet (25 mg total) by mouth every 8 (eight) hours as needed for dizziness   medroxyPROGESTERone (DEPO-PROVERA) 150 mg/mL injection  Self No No   Sig: Inject 1 mL (150 mg total) into a muscle every 3 (three) months      Facility-Administered Medications Last Administration Doses Remaining   medroxyPROGESTERone (DEPO-PROVERA) IM injection 150 mg 4/21/2023 11:41 AM         Discharge Medication List as of 1/5/2025  2:51 PM        START taking these medications    Details   cephalexin (KEFLEX) 250 mg capsule Take 2 capsules (500 mg total) by mouth 4 (four) times a day for 5 days, Starting Sun 1/5/2025, Until Fri 1/10/2025, Normal           CONTINUE these medications which have CHANGED    Details   meclizine (ANTIVERT) 25 mg tablet Take 1 tablet (25 mg total) by mouth every 8 (eight) hours as needed for dizziness, Starting Sun 1/5/2025, Normal           CONTINUE these medications which have NOT CHANGED    Details   Alcohol Swabs 70 % PADS May substitute brand based on insurance coverage. Check glucose TID., Normal      Blood Glucose Monitoring Suppl (OneTouch Verio Reflect) w/Device KIT May substitute brand based on insurance coverage. Check glucose TID., Normal      glucose blood (OneTouch Verio) test strip May substitute brand based on insurance coverage. Check glucose TID., Normal      insulin glargine (LANTUS)  "100 units/mL subcutaneous injection Inject 18 Units under the skin daily with breakfast, Starting Sat 11/9/2024, Normal      insulin lispro (HumALOG/ADMELOG) 100 units/mL injection Inject 6 Units under the skin 3 (three) times a day with meals, Starting Fri 11/8/2024, Normal      !! Insulin Syringe-Needle U-100 (BD Insulin Syringe U/F) 31G X 5/16\" 0.3 ML MISC For use with insulin. Pharmacy may dispense brand covered by insurance., Normal      !! Insulin Syringe-Needle U-100 (BD Insulin Syringe U/F) 31G X 5/16\" 0.3 ML MISC For use with insulin. Pharmacy may dispense brand covered by insurance., Normal      levothyroxine 25 mcg tablet Take 1 tablet (25 mcg total) by mouth daily in the early morning, Starting Sat 11/9/2024, Normal      medroxyPROGESTERone (DEPO-PROVERA) 150 mg/mL injection Inject 1 mL (150 mg total) into a muscle every 3 (three) months, Starting Mon 1/16/2023, Normal      OneTouch Delica Lancets 33G MISC May substitute brand based on insurance coverage. Check glucose TID., Normal       !! - Potential duplicate medications found. Please discuss with provider.        STOP taking these medications       phenazopyridine (PYRIDIUM) 200 mg tablet Comments:   Reason for Stopping:             No discharge procedures on file.  ED SEPSIS DOCUMENTATION   Time reflects when diagnosis was documented in both MDM as applicable and the Disposition within this note       Time User Action Codes Description Comment    1/5/2025  2:41 PM Anjelica Carcamo [N39.0] UTI (urinary tract infection)     1/5/2025  2:48 PM Anjelica Carcamo Add [R42] Dizziness     1/5/2025  2:54 PM Anjelica Carcamo Add [E11.65] Uncontrolled diabetes mellitus with hyperglycemia (HCC)                  Anjelica Carcamo,   01/05/25 1931    "

## 2025-01-06 ENCOUNTER — HOSPITAL ENCOUNTER (EMERGENCY)
Facility: HOSPITAL | Age: 45
Discharge: HOME | End: 2025-01-06
Attending: EMERGENCY MEDICINE

## 2025-01-06 VITALS
WEIGHT: 133.6 LBS | HEIGHT: 63 IN | SYSTOLIC BLOOD PRESSURE: 121 MMHG | TEMPERATURE: 98.5 F | BODY MASS INDEX: 23.67 KG/M2 | OXYGEN SATURATION: 98 % | HEART RATE: 82 BPM | RESPIRATION RATE: 18 BRPM | DIASTOLIC BLOOD PRESSURE: 68 MMHG

## 2025-01-06 DIAGNOSIS — N30.90 CYSTITIS: Primary | ICD-10-CM

## 2025-01-06 LAB
BACTERIA UR QL AUTO: ABNORMAL /HPF
BILIRUB UR QL STRIP: NEGATIVE
CLARITY UR: ABNORMAL
COLOR UR: YELLOW
GLUCOSE SERPL-MCNC: 278 MG/DL (ref 65–140)
GLUCOSE UR STRIP-MCNC: ABNORMAL MG/DL
HGB UR QL STRIP.AUTO: ABNORMAL
KETONES UR STRIP-MCNC: ABNORMAL MG/DL
LEUKOCYTE ESTERASE UR QL STRIP: ABNORMAL
NITRITE UR QL STRIP: NEGATIVE
NON-SQ EPI CELLS URNS QL MICRO: ABNORMAL /HPF
PH UR STRIP.AUTO: 6 [PH]
PROT UR STRIP-MCNC: NEGATIVE MG/DL
RBC #/AREA URNS AUTO: ABNORMAL /HPF
SP GR UR STRIP.AUTO: 1.02 (ref 1–1.03)
UROBILINOGEN UR QL STRIP.AUTO: 0.2 E.U./DL
WBC #/AREA URNS AUTO: ABNORMAL /HPF

## 2025-01-06 PROCEDURE — 82948 REAGENT STRIP/BLOOD GLUCOSE: CPT

## 2025-01-06 PROCEDURE — 99283 EMERGENCY DEPT VISIT LOW MDM: CPT

## 2025-01-06 PROCEDURE — 99284 EMERGENCY DEPT VISIT MOD MDM: CPT | Performed by: EMERGENCY MEDICINE

## 2025-01-06 PROCEDURE — 87186 SC STD MICRODIL/AGAR DIL: CPT | Performed by: EMERGENCY MEDICINE

## 2025-01-06 PROCEDURE — 81001 URINALYSIS AUTO W/SCOPE: CPT | Performed by: EMERGENCY MEDICINE

## 2025-01-06 PROCEDURE — 81003 URINALYSIS AUTO W/O SCOPE: CPT | Performed by: EMERGENCY MEDICINE

## 2025-01-06 PROCEDURE — 87086 URINE CULTURE/COLONY COUNT: CPT | Performed by: EMERGENCY MEDICINE

## 2025-01-06 PROCEDURE — 87077 CULTURE AEROBIC IDENTIFY: CPT | Performed by: EMERGENCY MEDICINE

## 2025-01-06 PROCEDURE — 96372 THER/PROPH/DIAG INJ SC/IM: CPT

## 2025-01-06 RX ORDER — CEFPODOXIME PROXETIL 200 MG/1
200 TABLET, FILM COATED ORAL 2 TIMES DAILY
Qty: 12 TABLET | Refills: 0 | Status: SHIPPED | OUTPATIENT
Start: 2025-01-06 | End: 2025-01-12

## 2025-01-06 RX ADMIN — LIDOCAINE HYDROCHLORIDE 1000 MG: 10 INJECTION, SOLUTION EPIDURAL; INFILTRATION; INTRACAUDAL; PERINEURAL at 06:55

## 2025-01-06 NOTE — Clinical Note
Gabby Olivera was seen and treated in our emergency department on 1/6/2025.                Diagnosis:     Gabby  may return to work on return date.    She may return on this date: 01/07/2025         If you have any questions or concerns, please don't hesitate to call.      James Wilburn RN    ______________________________           _______________          _______________  Hospital Representative                              Date                                Time

## 2025-01-06 NOTE — ED PROVIDER NOTES
Time reflects when diagnosis was documented in both MDM as applicable and the Disposition within this note       Time User Action Codes Description Comment    1/6/2025  6:58 AM Jerel Tate Add [N30.90] Cystitis           ED Disposition       ED Disposition   Discharge    Condition   Stable    Date/Time   Mon Jan 6, 2025  6:57 AM    Comment   Gabby CH[facility] Mahi Olivera discharge to home/self care.                   Assessment & Plan       Medical Decision Making  Cystitis - History and labs consistent with UTI.  No culture sent previously so will send now.  Concern for treatmetn failure so will change antibiotics and IM antibiotic ordered.    Amount and/or Complexity of Data Reviewed  Labs: ordered. Decision-making details documented in ED Course.    Risk  Prescription drug management.        ED Course as of 01/06/25 1852 Mon Jan 06, 2025   0656 Patient has longstanding history of urinary tract infections and it is presenting very typically with burning with urination.  Despite this, she has no abdominal pain or CVA tenderness.  She is only taken 3 doses of the antibiotics so far, however, since she has been getting worse we will treat with IM Rocephin for which a single dose alone may treat UTI while awaiting urine culture.     0707 POC Glucose(!): 278  Not concerning for DKA       Medications   cefTRIAXone (ROCEPHIN) 1,000 mg in lidocaine (PF) (XYLOCAINE-MPF) 1 % IM only syringe (1,000 mg Intramuscular Given 1/6/25 0655)       ED Risk Strat Scores                          SBIRT 20yo+      Flowsheet Row Most Recent Value   Initial Alcohol Screen: US AUDIT-C     1. How often do you have a drink containing alcohol? 0 Filed at: 01/06/2025 0650   2. How many drinks containing alcohol do you have on a typical day you are drinking?  0 Filed at: 01/06/2025 0650   3a. Male UNDER 65: How often do you have five or more drinks on one occasion? 0 Filed at: 01/06/2025 0650   3b. FEMALE Any Age, or MALE 65+: How often do you  have 4 or more drinks on one occassion? 0 Filed at: 2025   Audit-C Score 0 Filed at: 2025   SAMI: How many times in the past year have you...    Used an illegal drug or used a prescription medication for non-medical reasons? Never Filed at: 2025                            History of Present Illness       Chief Complaint   Patient presents with    Personal Problem     Patient reports being seen yesterday for UTI symptoms at [facility]. Started on an abx, after 3 doses reports increased burning sensation and increasingly cloudy urine.        Past Medical History:   Diagnosis Date    Diabetes mellitus (HCC)     Disease of thyroid gland       Past Surgical History:   Procedure Laterality Date     SECTION        Family History   Problem Relation Age of Onset    Diabetes Mother     No Known Problems Father     Diabetes Sister     No Known Problems Brother     No Known Problems Brother     No Known Problems Son     No Known Problems Son     Diabetes Maternal Grandmother     Breast cancer Neg Hx     Colon cancer Neg Hx     Ovarian cancer Neg Hx       Social History     Tobacco Use    Smoking status: Never    Smokeless tobacco: Never   Vaping Use    Vaping status: Never Used   Substance Use Topics    Alcohol use: Not Currently     Comment: socially    Drug use: Never      E-Cigarette/Vaping    E-Cigarette Use Never User       E-Cigarette/Vaping Substances    Nicotine No     THC No     CBD No     Flavoring No     Other No     Unknown No       I have reviewed and agree with the history as documented.     The patient reports that the past few days she has had burning with urination that feels exactly like multiple prior UTIs.  She denies any fevers.  She denies any flank or back pain.  She was seen in emergency department and diagnosed with a UTI and treated with Keflex x 3 doses, but she reports despite that the burning pain is worsening.  She notes she was on Augmentin a few months ago  for sinus infection, but cannot recall any other recent antibiotics that she has been on the side for the Keflex that she has just started.  She notes that she does have diabetes and her sugars have been above 200.  No nausea or vomiting.       used: Yes (James)        Review of Systems   All other systems reviewed and are negative.          Objective       ED Triage Vitals [01/06/25 0640]   Temperature Pulse Blood Pressure Respirations SpO2 Patient Position - Orthostatic VS   98.5 °F (36.9 °C) 82 121/68 18 98 % Lying      Temp Source Heart Rate Source BP Location FiO2 (%) Pain Score    Oral Monitor Left arm -- 7      Vitals      Date and Time Temp Pulse SpO2 Resp BP Pain Score FACES Pain Rating User   01/06/25 0655 -- -- -- -- -- 7 -- COLLEEN   01/06/25 0640 98.5 °F (36.9 °C) 82 98 % 18 121/68 7 -- COLLEEN            Physical Exam  Vitals and nursing note reviewed.   Constitutional:       General: She is not in acute distress.     Appearance: She is well-developed.   HENT:      Head: Normocephalic and atraumatic.   Eyes:      Conjunctiva/sclera: Conjunctivae normal.   Cardiovascular:      Rate and Rhythm: Normal rate and regular rhythm.      Heart sounds: No murmur heard.  Pulmonary:      Effort: Pulmonary effort is normal. No respiratory distress.      Breath sounds: Normal breath sounds.   Abdominal:      Palpations: Abdomen is soft.      Tenderness: There is no abdominal tenderness. There is no right CVA tenderness or left CVA tenderness.   Musculoskeletal:         General: No swelling.      Cervical back: Neck supple.   Skin:     General: Skin is warm and dry.      Capillary Refill: Capillary refill takes less than 2 seconds.   Neurological:      Mental Status: She is alert.   Psychiatric:         Mood and Affect: Mood normal.         Results Reviewed       Procedure Component Value Units Date/Time    Urine Microscopic [[id number]]  (Abnormal) Collected: 01/06/25 0655    Lab Status: Final result  "Specimen: Urine, Clean Catch Updated: 01/06/25 0739     RBC, UA 2-4 /hpf      WBC, UA Innumerable /hpf      Epithelial Cells Moderate /hpf      Bacteria, UA Innumerable /hpf     Urine culture [[id number]] Collected: 01/06/25 0655    Lab Status: In process Specimen: Urine, Clean Catch Updated: 01/06/25 0738    UA w Reflex to Microscopic w Reflex to Culture [[id number]]  (Abnormal) Collected: 01/06/25 0655    Lab Status: Final result Specimen: Urine, Clean Catch Updated: 01/06/25 0715     Color, UA Yellow     Clarity, UA Slightly Cloudy     Specific Gravity, UA 1.020     pH, UA 6.0     Leukocytes, UA 1+     Nitrite, UA Negative     Protein, UA Negative mg/dl      Glucose, UA 3+ mg/dl      Ketones, UA Trace mg/dl      Urobilinogen, UA 0.2 E.U./dl      Bilirubin, UA Negative     Occult Blood, UA 2+    Fingerstick Glucose (POCT) [[id number]]  (Abnormal) Collected: 01/06/25 0703    Lab Status: Final result Specimen: Blood Updated: 01/06/25 0704     POC Glucose 278 mg/dl             No orders to display       Procedures    ED Medication and Procedure Management   Prior to Admission Medications   Prescriptions Last Dose Informant Patient Reported? Taking?   Alcohol Swabs 70 % PADS   No No   Sig: May substitute brand based on insurance coverage. Check glucose TID.   Blood Glucose Monitoring Suppl (OneTouch Verio Reflect) w/Device KIT   No No   Sig: May substitute brand based on insurance coverage. Check glucose TID.   Insulin Syringe-Needle U-100 (BD Insulin Syringe U/F) 31G X 5/16\" 0.3 ML MISC   No No   Sig: For use with insulin. Pharmacy may dispense brand covered by insurance.   Insulin Syringe-Needle U-100 (BD Insulin Syringe U/F) 31G X 5/16\" 0.3 ML MISC   No No   Sig: For use with insulin. Pharmacy may dispense brand covered by insurance.   OneTouch Delica Lancets 33G MISC   No No   Sig: May substitute brand based on insurance coverage. Check glucose TID.   cephalexin (KEFLEX) 250 mg capsule   No No   Sig: Take 2 capsules " (500 mg total) by mouth 4 (four) times a day for 5 days   glucose blood (OneTouch Verio) test strip   No No   Sig: May substitute brand based on insurance coverage. Check glucose TID.   insulin glargine (LANTUS) 100 units/mL subcutaneous injection   No No   Sig: Inject 18 Units under the skin daily with breakfast   insulin lispro (HumALOG/ADMELOG) 100 units/mL injection   No No   Sig: Inject 6 Units under the skin 3 (three) times a day with meals   levothyroxine 25 mcg tablet   No No   Sig: Take 1 tablet (25 mcg total) by mouth daily in the early morning   meclizine (ANTIVERT) 25 mg tablet   No No   Sig: Take 1 tablet (25 mg total) by mouth every 8 (eight) hours as needed for dizziness   medroxyPROGESTERone (DEPO-PROVERA) 150 mg/mL injection  Self No No   Sig: Inject 1 mL (150 mg total) into a muscle every 3 (three) months   metFORMIN (GLUCOPHAGE) 500 mg tablet   No No   Sig: Take 2 tablets (1,000 mg total) by mouth daily with breakfast      Facility-Administered Medications Last Administration Doses Remaining   medroxyPROGESTERone (DEPO-PROVERA) IM injection 150 mg 4/21/2023 11:41 AM         Discharge Medication List as of 1/6/2025  7:01 AM        START taking these medications    Details   cefpodoxime (VANTIN) 200 mg tablet Take 1 tablet (200 mg total) by mouth 2 (two) times a day for 6 days, Starting Mon 1/6/2025, Until Sun 1/12/2025, Normal           CONTINUE these medications which have NOT CHANGED    Details   Alcohol Swabs 70 % PADS May substitute brand based on insurance coverage. Check glucose TID., Normal      Blood Glucose Monitoring Suppl (OneTouch Verio Reflect) w/Device KIT May substitute brand based on insurance coverage. Check glucose TID., Normal      cephalexin (KEFLEX) 250 mg capsule Take 2 capsules (500 mg total) by mouth 4 (four) times a day for 5 days, Starting Sun 1/5/2025, Until Fri 1/10/2025, Normal      glucose blood (OneTouch Verio) test strip May substitute brand based on insurance  "coverage. Check glucose TID., Normal      insulin glargine (LANTUS) 100 units/mL subcutaneous injection Inject 18 Units under the skin daily with breakfast, Starting Sat 11/9/2024, Normal      insulin lispro (HumALOG/ADMELOG) 100 units/mL injection Inject 6 Units under the skin 3 (three) times a day with meals, Starting Fri 11/8/2024, Normal      !! Insulin Syringe-Needle U-100 (BD Insulin Syringe U/F) 31G X 5/16\" 0.3 ML MISC For use with insulin. Pharmacy may dispense brand covered by insurance., Normal      !! Insulin Syringe-Needle U-100 (BD Insulin Syringe U/F) 31G X 5/16\" 0.3 ML MISC For use with insulin. Pharmacy may dispense brand covered by insurance., Normal      levothyroxine 25 mcg tablet Take 1 tablet (25 mcg total) by mouth daily in the early morning, Starting Sat 11/9/2024, Normal      meclizine (ANTIVERT) 25 mg tablet Take 1 tablet (25 mg total) by mouth every 8 (eight) hours as needed for dizziness, Starting Sun 1/5/2025, Normal      medroxyPROGESTERone (DEPO-PROVERA) 150 mg/mL injection Inject 1 mL (150 mg total) into a muscle every 3 (three) months, Starting Mon 1/16/2023, Normal      metFORMIN (GLUCOPHAGE) 500 mg tablet Take 2 tablets (1,000 mg total) by mouth daily with breakfast, Starting Sun 1/5/2025, Until Tue 2/4/2025, Normal      OneTouch Delica Lancets 33G MISC May substitute brand based on insurance coverage. Check glucose TID., Normal       !! - Potential duplicate medications found. Please discuss with provider.        No discharge procedures on file.  ED SEPSIS DOCUMENTATION   Time reflects when diagnosis was documented in both MDM as applicable and the Disposition within this note       Time User Action Codes Description Comment    1/6/2025  6:58 AM Jerel Tate Add [N30.90] Cystitis                  Jerel Tate MD  01/06/25 3931    "

## 2025-01-08 ENCOUNTER — RESULTS FOLLOW-UP (OUTPATIENT)
Dept: EMERGENCY DEPT | Facility: HOSPITAL | Age: 45
End: 2025-01-08

## 2025-01-08 LAB — BACTERIA UR CULT: ABNORMAL

## 2025-03-24 ENCOUNTER — HOSPITAL ENCOUNTER (EMERGENCY)
Facility: HOSPITAL | Age: 45
Discharge: HOME | End: 2025-03-24
Attending: EMERGENCY MEDICINE

## 2025-03-24 VITALS
BODY MASS INDEX: 24.1 KG/M2 | HEIGHT: 63 IN | HEART RATE: 99 BPM | WEIGHT: 136 LBS | DIASTOLIC BLOOD PRESSURE: 77 MMHG | TEMPERATURE: 98.5 F | OXYGEN SATURATION: 99 % | RESPIRATION RATE: 18 BRPM | SYSTOLIC BLOOD PRESSURE: 139 MMHG

## 2025-03-24 DIAGNOSIS — R73.9 HYPERGLYCEMIA: ICD-10-CM

## 2025-03-24 DIAGNOSIS — N39.0 ACUTE UTI: Primary | ICD-10-CM

## 2025-03-24 LAB
BACTERIA UR QL AUTO: ABNORMAL /HPF
BILIRUB UR QL STRIP: NEGATIVE
CLARITY UR: ABNORMAL
COLOR UR: YELLOW
EXT PREGNANCY TEST URINE: NEGATIVE
EXT. CONTROL: NORMAL
GLUCOSE SERPL-MCNC: 308 MG/DL (ref 65–140)
GLUCOSE UR STRIP-MCNC: ABNORMAL MG/DL
HGB UR QL STRIP.AUTO: ABNORMAL
KETONES UR STRIP-MCNC: ABNORMAL MG/DL
LEUKOCYTE ESTERASE UR QL STRIP: ABNORMAL
NITRITE UR QL STRIP: NEGATIVE
NON-SQ EPI CELLS URNS QL MICRO: ABNORMAL /HPF
PH UR STRIP.AUTO: 6 [PH]
PROT UR STRIP-MCNC: ABNORMAL MG/DL
RBC #/AREA URNS AUTO: ABNORMAL /HPF
SP GR UR STRIP.AUTO: 1.02 (ref 1–1.03)
UROBILINOGEN UR QL STRIP.AUTO: 0.2 E.U./DL
WBC #/AREA URNS AUTO: ABNORMAL /HPF

## 2025-03-24 PROCEDURE — 99284 EMERGENCY DEPT VISIT MOD MDM: CPT | Performed by: EMERGENCY MEDICINE

## 2025-03-24 PROCEDURE — 87186 SC STD MICRODIL/AGAR DIL: CPT | Performed by: EMERGENCY MEDICINE

## 2025-03-24 PROCEDURE — 87077 CULTURE AEROBIC IDENTIFY: CPT | Performed by: EMERGENCY MEDICINE

## 2025-03-24 PROCEDURE — 99283 EMERGENCY DEPT VISIT LOW MDM: CPT

## 2025-03-24 PROCEDURE — 82948 REAGENT STRIP/BLOOD GLUCOSE: CPT

## 2025-03-24 PROCEDURE — 87086 URINE CULTURE/COLONY COUNT: CPT | Performed by: EMERGENCY MEDICINE

## 2025-03-24 PROCEDURE — 81025 URINE PREGNANCY TEST: CPT | Performed by: EMERGENCY MEDICINE

## 2025-03-24 PROCEDURE — 81001 URINALYSIS AUTO W/SCOPE: CPT | Performed by: EMERGENCY MEDICINE

## 2025-03-24 RX ORDER — CEFPODOXIME PROXETIL 200 MG/1
200 TABLET, FILM COATED ORAL 2 TIMES DAILY
Qty: 13 TABLET | Refills: 0 | Status: SHIPPED | OUTPATIENT
Start: 2025-03-24 | End: 2025-03-24

## 2025-03-24 RX ORDER — CEFPODOXIME PROXETIL 200 MG/1
200 TABLET, FILM COATED ORAL ONCE
Status: COMPLETED | OUTPATIENT
Start: 2025-03-24 | End: 2025-03-24

## 2025-03-24 RX ORDER — ACETAMINOPHEN 325 MG/1
650 TABLET ORAL ONCE
Status: COMPLETED | OUTPATIENT
Start: 2025-03-24 | End: 2025-03-24

## 2025-03-24 RX ORDER — CEFPODOXIME PROXETIL 200 MG/1
200 TABLET, FILM COATED ORAL 2 TIMES DAILY
Qty: 13 TABLET | Refills: 0 | Status: SHIPPED | OUTPATIENT
Start: 2025-03-24 | End: 2025-03-31

## 2025-03-24 RX ADMIN — CEFPODOXIME PROXETIL 200 MG: 200 TABLET, FILM COATED ORAL at 06:53

## 2025-03-24 RX ADMIN — ACETAMINOPHEN 650 MG: 325 TABLET, FILM COATED ORAL at 06:53

## 2025-03-24 NOTE — ED PROVIDER NOTES
Time reflects when diagnosis was documented in both MDM as applicable and the Disposition within this note       Time User Action Codes Description Comment    3/24/2025  6:32 AM Jerel Jade Add [N39.0] Acute UTI     3/24/2025  6:54 AM Jerel Jade [R73.9] Hyperglycemia           ED Disposition       ED Disposition   Discharge    Condition   Stable    Date/Time   Mon Mar 24, 2025  6:48 AM    Comment   Gabby Olivera discharge to home/self care.                   Assessment & Plan       Medical Decision Making  Patient is a 45-year-old female seen in the emergency department with concern for dysuria, urinary frequency.  Urine pregnancy test was negative.  Patient was treated with medication for symptom control. Urinalysis remarkable for 1+ leukocytes, trace protein, 3+ glucose, 2+ ketones, 2+ occult blood, 4-10 red blood cells, 30-50 white blood cells, occasional epithelial cells, moderate bacteria. Evaluation is concerning for possible ascending urinary tract infection.  Fingerstick blood glucose was elevated at 308. Plan to treat patient with course of oral antibiotics, and have patient follow up with PCP/outpatient providers.  Patient stable for discharge home.  Discharge instructions were reviewed with patient. Patient requested prescription for home metformin as her prescription is almost completed.    Problems Addressed:  Acute UTI: acute illness or injury    Amount and/or Complexity of Data Reviewed  Labs: ordered. Decision-making details documented in ED Course.  ECG/medicine tests: ordered. Decision-making details documented in ED Course.    Risk  OTC drugs.  Prescription drug management.             Medications   acetaminophen (TYLENOL) tablet 650 mg (650 mg Oral Given 3/24/25 0653)   cefpodoxime (VANTIN) tablet 200 mg (200 mg Oral Given 3/24/25 0653)       ED Risk Strat Scores                            SBIRT 20yo+      Flowsheet Row Most Recent Value   Initial Alcohol Screen: US AUDIT-C      1. How often do you have a drink containing alcohol? 0 Filed at: 2025   2. How many drinks containing alcohol do you have on a typical day you are drinking?  0 Filed at: 2025   3a. Male UNDER 65: How often do you have five or more drinks on one occasion? 0 Filed at: 2025   3b. FEMALE Any Age, or MALE 65+: How often do you have 4 or more drinks on one occassion? 0 Filed at: 2025   Audit-C Score 0 Filed at: 2025   SAMI: How many times in the past year have you...    Used an illegal drug or used a prescription medication for non-medical reasons? Never Filed at: 2025                            History of Present Illness       Chief Complaint   Patient presents with    Possible UTI     Patient presents to ED, c/o of burning/ pain sensation with urination, malodorous and cloudy urine. Reports hx: diabetes and recurrent UTIs, with similar symptoms, and antibiotic resistant UTIs.        Past Medical History:   Diagnosis Date    Diabetes mellitus (HCC)     Disease of thyroid gland       Past Surgical History:   Procedure Laterality Date     SECTION        Family History   Problem Relation Age of Onset    Diabetes Mother     No Known Problems Father     Diabetes Sister     No Known Problems Brother     No Known Problems Brother     No Known Problems Son     No Known Problems Son     Diabetes Maternal Grandmother     Breast cancer Neg Hx     Colon cancer Neg Hx     Ovarian cancer Neg Hx       Social History     Tobacco Use    Smoking status: Never    Smokeless tobacco: Never   Vaping Use    Vaping status: Never Used   Substance Use Topics    Alcohol use: Not Currently     Comment: socially    Drug use: Never      E-Cigarette/Vaping    E-Cigarette Use Never User       E-Cigarette/Vaping Substances    Nicotine No     THC No     CBD No     Flavoring No     Other No     Unknown No       I have reviewed and agree with the history as documented.     Patient  is a 45-year-old female seen in the emergency department with concern for dysuria/urinary frequency over approximately the past 1-2 days.  Patient notes history of recurrent urinary tract infections in the past.  Patient also notes some additional left-sided back pain and chills.  Patient notes no fever, chest pain, shortness of breath, vomiting, weakness, numbness, tingling.  Patient states that she took an over-the-counter medication at home this morning, without significant proven of symptoms noted.  Patient notes no other definite clear exacerbating or alleviating factors for her symptoms.  Recent urine culture results were reviewed.        Review of Systems   Constitutional:  Positive for chills. Negative for fever.   HENT:  Negative for ear pain and sore throat.    Eyes:  Negative for pain and visual disturbance.   Respiratory:  Negative for cough and shortness of breath.    Cardiovascular:  Negative for chest pain and palpitations.   Gastrointestinal:  Negative for abdominal pain and vomiting.   Genitourinary:  Positive for dysuria and frequency.   Musculoskeletal:  Positive for back pain. Negative for neck stiffness.   Skin:  Negative for color change and rash.   Neurological:  Negative for seizures and syncope.   Psychiatric/Behavioral:  Negative for agitation and confusion.            Objective       ED Triage Vitals [03/24/25 0630]   Temperature Pulse Blood Pressure Respirations SpO2 Patient Position - Orthostatic VS   98.5 °F (36.9 °C) 99 139/77 18 99 % Sitting      Temp Source Heart Rate Source BP Location FiO2 (%) Pain Score    Oral Monitor Left arm -- --      Vitals      Date and Time Temp Pulse SpO2 Resp BP Pain Score FACES Pain Rating User   03/24/25 0630 98.5 °F (36.9 °C) 99 99 % 18 139/77 -- -- COLLEEN            Physical Exam  Vitals and nursing note reviewed.   Constitutional:       General: She is not in acute distress.     Appearance: She is well-developed.   HENT:      Head: Normocephalic and  atraumatic.      Right Ear: External ear normal.      Left Ear: External ear normal.      Nose: Nose normal.      Mouth/Throat:      Pharynx: Oropharynx is clear.   Eyes:      General: No scleral icterus.     Conjunctiva/sclera: Conjunctivae normal.   Cardiovascular:      Rate and Rhythm: Normal rate.      Comments: well-perfused extremities  Pulmonary:      Effort: Pulmonary effort is normal. No respiratory distress.   Abdominal:      General: There is no distension.      Palpations: Abdomen is soft.      Tenderness: There is no abdominal tenderness. There is no right CVA tenderness or left CVA tenderness.   Musculoskeletal:         General: No deformity or signs of injury.      Cervical back: Normal range of motion and neck supple.   Skin:     General: Skin is warm and dry.   Neurological:      General: No focal deficit present.      Mental Status: She is alert.      Cranial Nerves: No cranial nerve deficit.      Sensory: No sensory deficit.   Psychiatric:         Mood and Affect: Mood normal.         Thought Content: Thought content normal.         Results Reviewed       Procedure Component Value Units Date/Time    Urine Microscopic [[id number]]  (Abnormal) Collected: 03/24/25 0634    Lab Status: Final result Specimen: Urine, Clean Catch Updated: 03/24/25 0658     RBC, UA 4-10 /hpf      WBC, UA 30-50 /hpf      Epithelial Cells Occasional /hpf      Bacteria, UA Moderate /hpf     Fingerstick Glucose (POCT) [[id number]]  (Abnormal) Collected: 03/24/25 0653    Lab Status: Final result Specimen: Blood Updated: 03/24/25 0654     POC Glucose 308 mg/dl     POCT pregnancy, urine [[id number]]  (Normal) Collected: 03/24/25 0650    Lab Status: Final result Updated: 03/24/25 0650     EXT Preg Test, Ur Negative     Control Valid    UA (URINE) with reflex to Scope [[id number]]  (Abnormal) Collected: 03/24/25 0634    Lab Status: Final result Specimen: Urine, Clean Catch Updated: 03/24/25 0646     Color, UA Yellow     Clarity, UA  "Cloudy     Specific Gravity, UA 1.020     pH, UA 6.0     Leukocytes, UA 1+     Nitrite, UA Negative     Protein, UA Trace mg/dl      Glucose, UA 3+ mg/dl      Ketones, UA 40 (2+) mg/dl      Urobilinogen, UA 0.2 E.U./dl      Bilirubin, UA Negative     Occult Blood, UA 2+    Urine culture [[id number]] Collected: 03/24/25 0634    Lab Status: In process Specimen: Urine, Clean Catch Updated: 03/24/25 0643            No orders to display       Procedures    ED Medication and Procedure Management   Prior to Admission Medications   Prescriptions Last Dose Informant Patient Reported? Taking?   Alcohol Swabs 70 % PADS   No No   Sig: May substitute brand based on insurance coverage. Check glucose TID.   Blood Glucose Monitoring Suppl (OneTouch Verio Reflect) w/Device KIT   No No   Sig: May substitute brand based on insurance coverage. Check glucose TID.   Insulin Syringe-Needle U-100 (BD Insulin Syringe U/F) 31G X 5/16\" 0.3 ML MISC   No No   Sig: For use with insulin. Pharmacy may dispense brand covered by insurance.   Insulin Syringe-Needle U-100 (BD Insulin Syringe U/F) 31G X 5/16\" 0.3 ML MISC   No No   Sig: For use with insulin. Pharmacy may dispense brand covered by insurance.   OneTouch Delica Lancets 33G MISC   No No   Sig: May substitute brand based on insurance coverage. Check glucose TID.   glucose blood (OneTouch Verio) test strip   No No   Sig: May substitute brand based on insurance coverage. Check glucose TID.   insulin glargine (LANTUS) 100 units/mL subcutaneous injection   No No   Sig: Inject 18 Units under the skin daily with breakfast   insulin lispro (HumALOG/ADMELOG) 100 units/mL injection   No No   Sig: Inject 6 Units under the skin 3 (three) times a day with meals   levothyroxine 25 mcg tablet   No No   Sig: Take 1 tablet (25 mcg total) by mouth daily in the early morning   meclizine (ANTIVERT) 25 mg tablet   No No   Sig: Take 1 tablet (25 mg total) by mouth every 8 (eight) hours as needed for dizziness "   medroxyPROGESTERone (DEPO-PROVERA) 150 mg/mL injection  Self No No   Sig: Inject 1 mL (150 mg total) into a muscle every 3 (three) months   metFORMIN (GLUCOPHAGE) 500 mg tablet   No No   Sig: Take 2 tablets (1,000 mg total) by mouth daily with breakfast      Facility-Administered Medications Last Administration Doses Remaining   medroxyPROGESTERone (DEPO-PROVERA) IM injection 150 mg 4/21/2023 11:41 AM         Patient's Medications   Discharge Prescriptions    CEFPODOXIME (VANTIN) 200 MG TABLET    Take 1 tablet (200 mg total) by mouth 2 (two) times a day for 7 days       Start Date: 3/24/2025 End Date: 3/31/2025       Order Dose: 200 mg       Quantity: 13 tablet    Refills: 0    METFORMIN (GLUCOPHAGE) 500 MG TABLET    Take 1 tablet (500 mg total) by mouth 2 (two) times a day with meals for 21 days       Start Date: 3/24/2025 End Date: 4/14/2025       Order Dose: 500 mg       Quantity: 42 tablet    Refills: 0       ED SEPSIS DOCUMENTATION   Time reflects when diagnosis was documented in both MDM as applicable and the Disposition within this note       Time User Action Codes Description Comment    3/24/2025  6:32 AM Jerel Jade [N39.0] Acute UTI     3/24/2025  6:54 AM Jerel Jade [R73.9] Hyperglycemia                  Jerel Jade MD  03/24/25 0700       Jerel Jade MD  03/24/25 0708       Jerel Jade MD  03/24/25 0714       Jerel Jade MD  03/24/25 0715

## 2025-03-25 ENCOUNTER — RESULTS FOLLOW-UP (OUTPATIENT)
Dept: EMERGENCY DEPT | Facility: HOSPITAL | Age: 45
End: 2025-03-25

## 2025-03-26 LAB — BACTERIA UR CULT: ABNORMAL

## 2025-04-15 ENCOUNTER — APPOINTMENT (EMERGENCY)
Dept: RADIOLOGY | Facility: HOSPITAL | Age: 45
End: 2025-04-15

## 2025-04-15 ENCOUNTER — HOSPITAL ENCOUNTER (EMERGENCY)
Facility: HOSPITAL | Age: 45
Discharge: HOME | End: 2025-04-15
Attending: EMERGENCY MEDICINE

## 2025-04-15 VITALS
RESPIRATION RATE: 18 BRPM | HEART RATE: 89 BPM | OXYGEN SATURATION: 97 % | DIASTOLIC BLOOD PRESSURE: 63 MMHG | TEMPERATURE: 98.5 F | SYSTOLIC BLOOD PRESSURE: 120 MMHG

## 2025-04-15 DIAGNOSIS — R06.02 SHORTNESS OF BREATH: ICD-10-CM

## 2025-04-15 DIAGNOSIS — R00.2 PALPITATIONS: Primary | ICD-10-CM

## 2025-04-15 DIAGNOSIS — R79.89 ELEVATED TSH: ICD-10-CM

## 2025-04-15 DIAGNOSIS — R73.9 HYPERGLYCEMIA: ICD-10-CM

## 2025-04-15 LAB
ALBUMIN SERPL BCG-MCNC: 4.6 G/DL (ref 3.5–5)
ALP SERPL-CCNC: 79 U/L (ref 34–104)
ALT SERPL W P-5'-P-CCNC: 30 U/L (ref 7–52)
ANION GAP SERPL CALCULATED.3IONS-SCNC: 9 MMOL/L (ref 4–13)
AST SERPL W P-5'-P-CCNC: 19 U/L (ref 13–39)
BASOPHILS # BLD AUTO: 0.04 THOUSANDS/ÂΜL (ref 0–0.1)
BASOPHILS NFR BLD AUTO: 1 % (ref 0–1)
BILIRUB SERPL-MCNC: 0.38 MG/DL (ref 0.2–1)
BUN SERPL-MCNC: 18 MG/DL (ref 5–25)
CALCIUM SERPL-MCNC: 9.9 MG/DL (ref 8.4–10.2)
CARDIAC TROPONIN I PNL SERPL HS: <2 NG/L (ref ?–50)
CHLORIDE SERPL-SCNC: 99 MMOL/L (ref 96–108)
CO2 SERPL-SCNC: 24 MMOL/L (ref 21–32)
CREAT SERPL-MCNC: 0.69 MG/DL (ref 0.6–1.3)
EOSINOPHIL # BLD AUTO: 0.05 THOUSAND/ÂΜL (ref 0–0.61)
EOSINOPHIL NFR BLD AUTO: 1 % (ref 0–6)
ERYTHROCYTE [DISTWIDTH] IN BLOOD BY AUTOMATED COUNT: 11.5 % (ref 11.6–15.1)
GFR SERPL CREATININE-BSD FRML MDRD: 106 ML/MIN/1.73SQ M
GLUCOSE SERPL-MCNC: 243 MG/DL (ref 65–140)
GLUCOSE SERPL-MCNC: 300 MG/DL (ref 65–140)
HCG SERPL QL: NEGATIVE
HCT VFR BLD AUTO: 42.2 % (ref 34.8–46.1)
HGB BLD-MCNC: 14.7 G/DL (ref 11.5–15.4)
IMM GRANULOCYTES # BLD AUTO: 0.05 THOUSAND/UL (ref 0–0.2)
IMM GRANULOCYTES NFR BLD AUTO: 1 % (ref 0–2)
LYMPHOCYTES # BLD AUTO: 2.16 THOUSANDS/ÂΜL (ref 0.6–4.47)
LYMPHOCYTES NFR BLD AUTO: 34 % (ref 14–44)
MAGNESIUM SERPL-MCNC: 2 MG/DL (ref 1.9–2.7)
MCH RBC QN AUTO: 31.5 PG (ref 26.8–34.3)
MCHC RBC AUTO-ENTMCNC: 34.8 G/DL (ref 31.4–37.4)
MCV RBC AUTO: 91 FL (ref 82–98)
MONOCYTES # BLD AUTO: 0.49 THOUSAND/ÂΜL (ref 0.17–1.22)
MONOCYTES NFR BLD AUTO: 8 % (ref 4–12)
NEUTROPHILS # BLD AUTO: 3.5 THOUSANDS/ÂΜL (ref 1.85–7.62)
NEUTS SEG NFR BLD AUTO: 55 % (ref 43–75)
NRBC BLD AUTO-RTO: 0 /100 WBCS
PLATELET # BLD AUTO: 216 THOUSANDS/UL (ref 149–390)
PMV BLD AUTO: 10.7 FL (ref 8.9–12.7)
POTASSIUM SERPL-SCNC: 3.6 MMOL/L (ref 3.5–5.3)
PROT SERPL-MCNC: 8 G/DL (ref 6.4–8.4)
RBC # BLD AUTO: 4.66 MILLION/UL (ref 3.81–5.12)
SODIUM SERPL-SCNC: 132 MMOL/L (ref 135–147)
T4 FREE SERPL-MCNC: 0.59 NG/DL (ref 0.61–1.12)
TSH SERPL DL<=0.05 MIU/L-ACNC: 34.12 UIU/ML (ref 0.45–4.5)
WBC # BLD AUTO: 6.29 THOUSAND/UL (ref 4.31–10.16)

## 2025-04-15 PROCEDURE — 36415 COLL VENOUS BLD VENIPUNCTURE: CPT | Performed by: EMERGENCY MEDICINE

## 2025-04-15 PROCEDURE — 96361 HYDRATE IV INFUSION ADD-ON: CPT

## 2025-04-15 PROCEDURE — 96360 HYDRATION IV INFUSION INIT: CPT

## 2025-04-15 PROCEDURE — 71045 X-RAY EXAM CHEST 1 VIEW: CPT

## 2025-04-15 PROCEDURE — 82948 REAGENT STRIP/BLOOD GLUCOSE: CPT

## 2025-04-15 PROCEDURE — 93005 ELECTROCARDIOGRAM TRACING: CPT

## 2025-04-15 PROCEDURE — 85025 COMPLETE CBC W/AUTO DIFF WBC: CPT | Performed by: EMERGENCY MEDICINE

## 2025-04-15 PROCEDURE — 83735 ASSAY OF MAGNESIUM: CPT | Performed by: EMERGENCY MEDICINE

## 2025-04-15 PROCEDURE — 84703 CHORIONIC GONADOTROPIN ASSAY: CPT | Performed by: EMERGENCY MEDICINE

## 2025-04-15 PROCEDURE — 84443 ASSAY THYROID STIM HORMONE: CPT | Performed by: EMERGENCY MEDICINE

## 2025-04-15 PROCEDURE — 99285 EMERGENCY DEPT VISIT HI MDM: CPT

## 2025-04-15 PROCEDURE — 84484 ASSAY OF TROPONIN QUANT: CPT | Performed by: EMERGENCY MEDICINE

## 2025-04-15 PROCEDURE — 84439 ASSAY OF FREE THYROXINE: CPT | Performed by: EMERGENCY MEDICINE

## 2025-04-15 PROCEDURE — 80053 COMPREHEN METABOLIC PANEL: CPT | Performed by: EMERGENCY MEDICINE

## 2025-04-15 RX ADMIN — SODIUM CHLORIDE 1000 ML: 0.9 INJECTION, SOLUTION INTRAVENOUS at 18:30

## 2025-04-15 NOTE — ED PROVIDER NOTES
Time reflects when diagnosis was documented in both MDM as applicable and the Disposition within this note       Time User Action Codes Description Comment    4/15/2025  8:07 PM Beti Roe Add [R00.2] Palpitations     4/15/2025  8:08 PM Beti Roe Add [R06.02] Shortness of breath     4/15/2025  8:08 PM Beti Roe Add [R73.9] Hyperglycemia     4/15/2025  8:08 PM Beti Roe Add [R79.89] Elevated TSH           ED Disposition       ED Disposition   Discharge    Condition   Stable    Date/Time   Tue Apr 15, 2025  8:07 PM    Comment   Gabby [facility] Mahi Olivera discharge to home/self care.                   Assessment & Plan       Medical Decision Making  45-year-old female presenting for evaluation of palpitations, shortness of breath.  Vital signs stable on arrival.  Differential diagnoses include not limited to arrhythmia, ACS, pneumonia, pneumothorax, thyroid abnormality, electrolyte abnormality.  Labs notable for hyperglycemia without evidence of DKA, elevated TSH.  Chest x-ray and EKG unremarkable.  Patient given a liter of IV fluids with downtrending blood sugar.  She is otherwise stable for discharge at this time.  Advised follow-up with PCP regarding her abnormal labs.  Return precautions discussed.    Problems Addressed:  Elevated TSH: acute illness or injury  Hyperglycemia: acute illness or injury  Palpitations: acute illness or injury  Shortness of breath: acute illness or injury    Amount and/or Complexity of Data Reviewed  Labs: ordered. Decision-making details documented in ED Course.  Radiology: ordered and independent interpretation performed.  ECG/medicine tests: ordered and independent interpretation performed. Decision-making details documented in ED Course.        ED Course as of 04/15/25 2028   Tue Apr 15, 2025   1747 Procedure Note: EKG  Date/Time: 04/15/25 5:33 PM   Interpreted by: Beti Roe  Indications / Diagnosis: palpitations  ECG reviewed by me, the ED Provider: yes   The EKG  demonstrates:  Rhythm: rate 93, normal sinus  Intervals: normal intervals  Axis: normal axis  QRS/Blocks: normal QRS  ST Changes: No acute ST Changes, no STD/LANA.    1756 CBC and differential(!)       Medications   sodium chloride 0.9 % bolus 1,000 mL (0 mL Intravenous Stopped 4/15/25 2015)       ED Risk Strat Scores   HEART Risk Score      Flowsheet Row Most Recent Value   Heart Score Risk Calculator    History 0 Filed at: 04/15/2025 2007   ECG 0 Filed at: 04/15/2025 2007   Age 0 Filed at: 04/15/2025 2007   Risk Factors 1 Filed at: 04/15/2025 2007   Troponin 0 Filed at: 04/15/2025 2007   HEART Score 1 Filed at: 04/15/2025 2007          HEART Risk Score      Flowsheet Row Most Recent Value   Heart Score Risk Calculator    History 0 Filed at: 04/15/2025 2007   ECG 0 Filed at: 04/15/2025 2007   Age 0 Filed at: 04/15/2025 2007   Risk Factors 1 Filed at: 04/15/2025 2007   Troponin 0 Filed at: 04/15/2025 2007   HEART Score 1 Filed at: 04/15/2025 2007                      No data recorded                            History of Present Illness       Chief Complaint   Patient presents with    Chest Pain     Patient reports chest pain that began today, she reports having similar pain like this last year.        Past Medical History:   Diagnosis Date    Diabetes mellitus (HCC)     Disease of thyroid gland       Past Surgical History:   Procedure Laterality Date     SECTION        Family History   Problem Relation Age of Onset    Diabetes Mother     No Known Problems Father     Diabetes Sister     No Known Problems Brother     No Known Problems Brother     No Known Problems Son     No Known Problems Son     Diabetes Maternal Grandmother     Breast cancer Neg Hx     Colon cancer Neg Hx     Ovarian cancer Neg Hx       Social History     Tobacco Use    Smoking status: Never    Smokeless tobacco: Never   Vaping Use    Vaping status: Never Used   Substance Use Topics    Alcohol use: Not Currently     Comment: socially     Drug use: Never      E-Cigarette/Vaping    E-Cigarette Use Never User       E-Cigarette/Vaping Substances    Nicotine No     THC No     CBD No     Flavoring No     Other No     Unknown No       I have reviewed and agree with the history as documented.     45-year-old female with history of diabetes presenting for evaluation of palpitations and shortness of breath.  Patient reports onset around 10 AM today, approximately 8 hours prior to arrival.  She reports that she has been experiencing palpitations which feel as though her heart was racing.  No chest pain but her chest feels uncomfortable.  She feels slightly short of breath and intermittently feels anxious as well.  No nausea, vomiting, abdominal pain, fever, cough.        Review of Systems   Constitutional:  Negative for chills and fever.   Respiratory:  Positive for shortness of breath. Negative for cough.    Cardiovascular:  Positive for palpitations. Negative for chest pain and leg swelling.   Gastrointestinal:  Negative for abdominal pain, diarrhea, nausea and vomiting.   Genitourinary:  Negative for flank pain.   Musculoskeletal:  Negative for gait problem.   Skin:  Negative for rash.   Neurological:  Negative for weakness and light-headedness.   All other systems reviewed and are negative.          Objective       ED Triage Vitals   Temperature Pulse Blood Pressure Respirations SpO2 Patient Position - Orthostatic VS   04/15/25 1747 04/15/25 1730 04/15/25 1730 04/15/25 1730 04/15/25 1730 04/15/25 1730   98.5 °F (36.9 °C) 96 164/78 18 99 % Lying      Temp Source Heart Rate Source BP Location FiO2 (%) Pain Score    04/15/25 1747 04/15/25 1730 04/15/25 1730 -- --    Oral Monitor Left arm        Vitals      Date and Time Temp Pulse SpO2 Resp BP Pain Score FACES Pain Rating User   04/15/25 2000 -- 89 97 % 18 120/63 -- -- MS   04/15/25 1930 -- 86 97 % 18 116/58 -- -- MS   04/15/25 1747 98.5 °F (36.9 °C) -- -- -- -- -- -- MP   04/15/25 1730 -- 96 99 % 18 164/78  -- -- MP            Physical Exam  Vitals and nursing note reviewed.   Constitutional:       General: She is not in acute distress.     Appearance: She is well-developed. She is not ill-appearing.   HENT:      Head: Normocephalic and atraumatic.      Nose: Nose normal.      Mouth/Throat:      Mouth: Mucous membranes are moist.   Eyes:      Conjunctiva/sclera: Conjunctivae normal.   Cardiovascular:      Rate and Rhythm: Normal rate and regular rhythm.      Heart sounds: No murmur heard.     No friction rub. No gallop.   Pulmonary:      Effort: Pulmonary effort is normal.      Breath sounds: Normal breath sounds. No wheezing, rhonchi or rales.   Abdominal:      General: There is no distension.      Palpations: Abdomen is soft.      Tenderness: There is no abdominal tenderness.   Musculoskeletal:         General: No swelling or tenderness. Normal range of motion.      Cervical back: Normal range of motion and neck supple.   Skin:     General: Skin is warm and dry.      Coloration: Skin is not pale.      Findings: No rash.   Neurological:      General: No focal deficit present.      Mental Status: She is alert and oriented to person, place, and time.   Psychiatric:         Behavior: Behavior normal.         Results Reviewed       Procedure Component Value Units Date/Time    Fingerstick Glucose (POCT) [[id number]]  (Abnormal) Collected: 04/15/25 2006    Lab Status: Final result Specimen: Blood Updated: 04/15/25 2006     POC Glucose 243 mg/dl     HS Troponin 0hr (reflex protocol) [[id number]]  (Normal) Collected: 04/15/25 1748    Lab Status: Final result Specimen: Blood from Arm, Right Updated: 04/15/25 1827     hs TnI 0hr <2 ng/L     hCG, qualitative pregnancy [[id number]]  (Normal) Collected: 04/15/25 1748    Lab Status: Final result Specimen: Blood from Arm, Right Updated: 04/15/25 1827     Preg, Serum Negative    TSH, 3rd generation with Free T4 reflex [[id number]]  (Abnormal) Collected: 04/15/25 1748    Lab Status: Final  result Specimen: Blood from Arm, Right Updated: 04/15/25 1827     TSH 3RD GENERATON 34.122 uIU/mL     T4, free [[id number]] Collected: 04/15/25 1748    Lab Status: In process Specimen: Blood from Arm, Right Updated: 04/15/25 1827    Comprehensive metabolic panel [[id number]]  (Abnormal) Collected: 04/15/25 1748    Lab Status: Final result Specimen: Blood from Arm, Right Updated: 04/15/25 1814     Sodium 132 mmol/L      Potassium 3.6 mmol/L      Chloride 99 mmol/L      CO2 24 mmol/L      ANION GAP 9 mmol/L      BUN 18 mg/dL      Creatinine 0.69 mg/dL      Glucose 300 mg/dL      Calcium 9.9 mg/dL      AST 19 U/L      ALT 30 U/L      Alkaline Phosphatase 79 U/L      Total Protein 8.0 g/dL      Albumin 4.6 g/dL      Total Bilirubin 0.38 mg/dL      eGFR 106 ml/min/1.73sq m     Narrative:      [employer] guidelines for Chronic Kidney Disease (CKD):     Stage 1 with normal or high GFR (GFR > 90 mL/min/1.73 square meters)    Stage 2 Mild CKD (GFR = 60-89 mL/min/1.73 square meters)    Stage 3A Moderate CKD (GFR = 45-59 mL/min/1.73 square meters)    Stage 3B Moderate CKD (GFR = 30-44 mL/min/1.73 square meters)    Stage 4 Severe CKD (GFR = 15-29 mL/min/1.73 square meters)    Stage 5 End Stage CKD (GFR <15 mL/min/1.73 square meters)  Note: GFR calculation is accurate only with a steady state creatinine    Magnesium [[id number]]  (Normal) Collected: 04/15/25 1748    Lab Status: Final result Specimen: Blood from Arm, Right Updated: 04/15/25 1814     Magnesium 2.0 mg/dL     CBC and differential [[id number]]  (Abnormal) Collected: 04/15/25 1748    Lab Status: Final result Specimen: Blood from Arm, Right Updated: 04/15/25 1755     WBC 6.29 Thousand/uL      RBC 4.66 Million/uL      Hemoglobin 14.7 g/dL      Hematocrit 42.2 %      MCV 91 fL      MCH 31.5 pg      MCHC 34.8 g/dL      RDW 11.5 %      MPV 10.7 fL      Platelets 216 Thousands/uL      nRBC 0 /100 WBCs      Segmented % 55 %      Immature Grans % 1 %       "Lymphocytes % 34 %      Monocytes % 8 %      Eosinophils Relative 1 %      Basophils Relative 1 %      Absolute Neutrophils 3.50 Thousands/µL      Absolute Immature Grans 0.05 Thousand/uL      Absolute Lymphocytes 2.16 Thousands/µL      Absolute Monocytes 0.49 Thousand/µL      Eosinophils Absolute 0.05 Thousand/µL      Basophils Absolute 0.04 Thousands/µL             XR chest 1 view portable   ED Interpretation by Beti Roe MD (04/15 1848)   No infiltrate or pneumothorax. Independently interpreted by me.          Procedures    ED Medication and Procedure Management   Prior to Admission Medications   Prescriptions Last Dose Informant Patient Reported? Taking?   Alcohol Swabs 70 % PADS   No No   Sig: May substitute brand based on insurance coverage. Check glucose TID.   Blood Glucose Monitoring Suppl (OneTouch Verio Reflect) w/Device KIT   No No   Sig: May substitute brand based on insurance coverage. Check glucose TID.   Insulin Syringe-Needle U-100 (BD Insulin Syringe U/F) 31G X 5/16\" 0.3 ML MISC   No No   Sig: For use with insulin. Pharmacy may dispense brand covered by insurance.   Insulin Syringe-Needle U-100 (BD Insulin Syringe U/F) 31G X 5/16\" 0.3 ML MISC   No No   Sig: For use with insulin. Pharmacy may dispense brand covered by insurance.   OneTouch Delica Lancets 33G MISC   No No   Sig: May substitute brand based on insurance coverage. Check glucose TID.   glucose blood (OneTouch Verio) test strip   No No   Sig: May substitute brand based on insurance coverage. Check glucose TID.   insulin glargine (LANTUS) 100 units/mL subcutaneous injection   No No   Sig: Inject 18 Units under the skin daily with breakfast   insulin lispro (HumALOG/ADMELOG) 100 units/mL injection   No No   Sig: Inject 6 Units under the skin 3 (three) times a day with meals   levothyroxine 25 mcg tablet   No No   Sig: Take 1 tablet (25 mcg total) by mouth daily in the early morning   meclizine (ANTIVERT) 25 mg tablet   No No   Sig: " "Take 1 tablet (25 mg total) by mouth every 8 (eight) hours as needed for dizziness   medroxyPROGESTERone (DEPO-PROVERA) 150 mg/mL injection  Self No No   Sig: Inject 1 mL (150 mg total) into a muscle every 3 (three) months   metFORMIN (GLUCOPHAGE) 500 mg tablet   No No   Sig: Take 2 tablets (1,000 mg total) by mouth daily with breakfast   metFORMIN (GLUCOPHAGE) 500 mg tablet   No No   Sig: Take 1 tablet (500 mg total) by mouth 2 (two) times a day with meals for 21 days      Facility-Administered Medications Last Administration Doses Remaining   medroxyPROGESTERone (DEPO-PROVERA) IM injection 150 mg 4/21/2023 11:41 AM         Discharge Medication List as of 4/15/2025  8:08 PM        CONTINUE these medications which have NOT CHANGED    Details   Alcohol Swabs 70 % PADS May substitute brand based on insurance coverage. Check glucose TID., Normal      Blood Glucose Monitoring Suppl (OneTouch Verio Reflect) w/Device KIT May substitute brand based on insurance coverage. Check glucose TID., Normal      glucose blood (OneTouch Verio) test strip May substitute brand based on insurance coverage. Check glucose TID., Normal      insulin glargine (LANTUS) 100 units/mL subcutaneous injection Inject 18 Units under the skin daily with breakfast, Starting Sat 11/9/2024, Normal      insulin lispro (HumALOG/ADMELOG) 100 units/mL injection Inject 6 Units under the skin 3 (three) times a day with meals, Starting Fri 11/8/2024, Normal      !! Insulin Syringe-Needle U-100 (BD Insulin Syringe U/F) 31G X 5/16\" 0.3 ML MISC For use with insulin. Pharmacy may dispense brand covered by insurance., Normal      !! Insulin Syringe-Needle U-100 (BD Insulin Syringe U/F) 31G X 5/16\" 0.3 ML MISC For use with insulin. Pharmacy may dispense brand covered by insurance., Normal      levothyroxine 25 mcg tablet Take 1 tablet (25 mcg total) by mouth daily in the early morning, Starting Sat 11/9/2024, Normal      meclizine (ANTIVERT) 25 mg tablet Take 1 " tablet (25 mg total) by mouth every 8 (eight) hours as needed for dizziness, Starting Sun 1/5/2025, Normal      medroxyPROGESTERone (DEPO-PROVERA) 150 mg/mL injection Inject 1 mL (150 mg total) into a muscle every 3 (three) months, Starting Mon 1/16/2023, Normal      metFORMIN (GLUCOPHAGE) 500 mg tablet Take 2 tablets (1,000 mg total) by mouth daily with breakfast, Starting Sun 1/5/2025, Until Tue 2/4/2025, Normal      metFORMIN (GLUCOPHAGE) 500 mg tablet Take 1 tablet (500 mg total) by mouth 2 (two) times a day with meals for 21 days, Starting Mon 3/24/2025, Until Mon 4/14/2025, Normal      OneTouch Delica Lancets 33G MISC May substitute brand based on insurance coverage. Check glucose TID., Normal       !! - Potential duplicate medications found. Please discuss with provider.        No discharge procedures on file.  ED SEPSIS DOCUMENTATION   Time reflects when diagnosis was documented in both MDM as applicable and the Disposition within this note       Time User Action Codes Description Comment    4/15/2025  8:07 PM Beit Roe [R00.2] Palpitations     4/15/2025  8:08 PM Beti Roe [R06.02] Shortness of breath     4/15/2025  8:08 PM Beti Roe [R73.9] Hyperglycemia     4/15/2025  8:08 PM Beti Roe [R79.89] Elevated TSH                  Beti Roe MD  04/15/25 2028

## 2025-04-16 LAB
ATRIAL RATE: 93 BPM
P AXIS: 53 DEGREES
PR INTERVAL: 150 MS
QRS AXIS: 41 DEGREES
QRSD INTERVAL: 90 MS
QT INTERVAL: 372 MS
QTC INTERVAL: 462 MS
T WAVE AXIS: 34 DEGREES
VENTRICULAR RATE: 93 BPM

## 2025-04-16 PROCEDURE — 93010 ELECTROCARDIOGRAM REPORT: CPT | Performed by: INTERNAL MEDICINE

## 2025-04-16 NOTE — DISCHARGE INSTRUCTIONS
Follow-up with your primary care physician as they may need to adjust your thyroid and diabetes medications.  Drink plenty of water.  Please return to the emergency department if you develop worsening symptoms, severe pain, chest pain, difficulty breathing, or anything else concerning to you.    Consulte con chirinos médico de cabecera, ya que podría necesitar ajustar barbara medicamentos para la tiroides y la diabetes. Nicole abundante agua. Por favor, acuda a urgencias si presenta empeoramiento de los síntomas, dolor intenso, dolor en el pecho, dificultad para respirar o cualquier otra inquietud.